# Patient Record
Sex: FEMALE | Race: WHITE | NOT HISPANIC OR LATINO | Employment: OTHER | URBAN - METROPOLITAN AREA
[De-identification: names, ages, dates, MRNs, and addresses within clinical notes are randomized per-mention and may not be internally consistent; named-entity substitution may affect disease eponyms.]

---

## 2017-05-03 ENCOUNTER — OFFICE VISIT - HEALTHEAST (OUTPATIENT)
Dept: INTERNAL MEDICINE | Facility: CLINIC | Age: 62
End: 2017-05-03

## 2017-05-03 DIAGNOSIS — Z78.0 MENOPAUSE: ICD-10-CM

## 2017-05-03 DIAGNOSIS — E78.2 MIXED HYPERLIPIDEMIA: ICD-10-CM

## 2017-05-03 DIAGNOSIS — I10 ESSENTIAL HYPERTENSION WITH GOAL BLOOD PRESSURE LESS THAN 140/90: ICD-10-CM

## 2017-05-03 DIAGNOSIS — Z00.00 HEALTH CARE MAINTENANCE: ICD-10-CM

## 2017-05-03 LAB
CHOLEST SERPL-MCNC: 263 MG/DL
FASTING STATUS PATIENT QL REPORTED: YES
HDLC SERPL-MCNC: 61 MG/DL
LDLC SERPL CALC-MCNC: 180 MG/DL
TRIGL SERPL-MCNC: 109 MG/DL

## 2017-05-03 ASSESSMENT — MIFFLIN-ST. JEOR: SCORE: 1136.99

## 2017-09-10 ENCOUNTER — HEALTH MAINTENANCE LETTER (OUTPATIENT)
Age: 62
End: 2017-09-10

## 2017-10-25 ENCOUNTER — COMMUNICATION - HEALTHEAST (OUTPATIENT)
Dept: INTERNAL MEDICINE | Facility: CLINIC | Age: 62
End: 2017-10-25

## 2018-02-02 ENCOUNTER — COMMUNICATION - HEALTHEAST (OUTPATIENT)
Dept: INTERNAL MEDICINE | Facility: CLINIC | Age: 63
End: 2018-02-02

## 2018-04-04 ENCOUNTER — COMMUNICATION - HEALTHEAST (OUTPATIENT)
Dept: INTERNAL MEDICINE | Facility: CLINIC | Age: 63
End: 2018-04-04

## 2018-04-04 DIAGNOSIS — Z78.0 MENOPAUSE: ICD-10-CM

## 2018-06-06 ENCOUNTER — HOSPITAL ENCOUNTER (OUTPATIENT)
Dept: MAMMOGRAPHY | Facility: CLINIC | Age: 63
Discharge: HOME | End: 2018-06-06
Attending: INTERNAL MEDICINE

## 2018-06-06 DIAGNOSIS — Z12.31 VISIT FOR SCREENING MAMMOGRAM: ICD-10-CM

## 2018-07-11 ENCOUNTER — OFFICE VISIT - HEALTHEAST (OUTPATIENT)
Dept: INTERNAL MEDICINE | Facility: CLINIC | Age: 63
End: 2018-07-11

## 2018-07-11 DIAGNOSIS — E55.9 VITAMIN D DEFICIENCY: ICD-10-CM

## 2018-07-11 DIAGNOSIS — Z00.00 WELCOME TO MEDICARE PREVENTIVE VISIT: ICD-10-CM

## 2018-07-11 DIAGNOSIS — Z00.01 ENCOUNTER FOR GENERAL ADULT MEDICAL EXAMINATION WITH ABNORMAL FINDINGS: ICD-10-CM

## 2018-07-11 DIAGNOSIS — Z78.0 MENOPAUSE: ICD-10-CM

## 2018-07-11 DIAGNOSIS — I10 ESSENTIAL HYPERTENSION: ICD-10-CM

## 2018-07-11 DIAGNOSIS — E78.5 HYPERLIPIDEMIA, UNSPECIFIED HYPERLIPIDEMIA TYPE: ICD-10-CM

## 2018-07-11 LAB
ALBUMIN SERPL-MCNC: 4.3 G/DL (ref 3.5–5)
ALBUMIN UR-MCNC: NEGATIVE MG/DL
ALP SERPL-CCNC: 55 U/L (ref 45–120)
ALT SERPL W P-5'-P-CCNC: 16 U/L (ref 0–45)
ANION GAP SERPL CALCULATED.3IONS-SCNC: 12 MMOL/L (ref 5–18)
APPEARANCE UR: CLEAR
AST SERPL W P-5'-P-CCNC: 22 U/L (ref 0–40)
ATRIAL RATE - MUSE: 67 BPM
BILIRUB DIRECT SERPL-MCNC: 0.2 MG/DL
BILIRUB SERPL-MCNC: 0.8 MG/DL (ref 0–1)
BILIRUB UR QL STRIP: NEGATIVE
BUN SERPL-MCNC: 16 MG/DL (ref 8–22)
CALCIUM SERPL-MCNC: 9.7 MG/DL (ref 8.5–10.5)
CHLORIDE BLD-SCNC: 104 MMOL/L (ref 98–107)
CHOLEST SERPL-MCNC: 270 MG/DL
CO2 SERPL-SCNC: 25 MMOL/L (ref 22–31)
COLOR UR AUTO: YELLOW
CREAT SERPL-MCNC: 0.85 MG/DL (ref 0.6–1.1)
DIASTOLIC BLOOD PRESSURE - MUSE: NORMAL MMHG
ERYTHROCYTE [DISTWIDTH] IN BLOOD BY AUTOMATED COUNT: 11.6 % (ref 11–14.5)
FASTING STATUS PATIENT QL REPORTED: YES
GFR SERPL CREATININE-BSD FRML MDRD: >60 ML/MIN/1.73M2
GLUCOSE BLD-MCNC: 80 MG/DL (ref 70–125)
GLUCOSE UR STRIP-MCNC: NEGATIVE MG/DL
HCT VFR BLD AUTO: 41.9 % (ref 35–47)
HDLC SERPL-MCNC: 79 MG/DL
HGB BLD-MCNC: 14.1 G/DL (ref 12–16)
HGB UR QL STRIP: NEGATIVE
INTERPRETATION ECG - MUSE: NORMAL
KETONES UR STRIP-MCNC: NEGATIVE MG/DL
LDLC SERPL CALC-MCNC: 175 MG/DL
LEUKOCYTE ESTERASE UR QL STRIP: NEGATIVE
MCH RBC QN AUTO: 31.2 PG (ref 27–34)
MCHC RBC AUTO-ENTMCNC: 33.5 G/DL (ref 32–36)
MCV RBC AUTO: 93 FL (ref 80–100)
NITRATE UR QL: NEGATIVE
P AXIS - MUSE: 74 DEGREES
PH UR STRIP: 6 [PH] (ref 5–8)
PLATELET # BLD AUTO: 284 THOU/UL (ref 140–440)
PMV BLD AUTO: 8.1 FL (ref 7–10)
POTASSIUM BLD-SCNC: 4 MMOL/L (ref 3.5–5)
PR INTERVAL - MUSE: 206 MS
PROT SERPL-MCNC: 6.9 G/DL (ref 6–8)
QRS DURATION - MUSE: 88 MS
QT - MUSE: 426 MS
QTC - MUSE: 450 MS
R AXIS - MUSE: 11 DEGREES
RBC # BLD AUTO: 4.5 MILL/UL (ref 3.8–5.4)
SODIUM SERPL-SCNC: 141 MMOL/L (ref 136–145)
SP GR UR STRIP: <=1.005 (ref 1–1.03)
SYSTOLIC BLOOD PRESSURE - MUSE: NORMAL MMHG
T AXIS - MUSE: 34 DEGREES
TRIGL SERPL-MCNC: 81 MG/DL
TSH SERPL DL<=0.005 MIU/L-ACNC: 1.36 UIU/ML (ref 0.3–5)
UROBILINOGEN UR STRIP-ACNC: NORMAL
VENTRICULAR RATE- MUSE: 67 BPM
WBC: 5.2 THOU/UL (ref 4–11)

## 2018-07-11 ASSESSMENT — MIFFLIN-ST. JEOR: SCORE: 1150.32

## 2018-07-12 LAB — 25(OH)D3 SERPL-MCNC: 51.6 NG/ML (ref 30–80)

## 2018-07-18 ENCOUNTER — RECORDS - HEALTHEAST (OUTPATIENT)
Dept: ADMINISTRATIVE | Facility: OTHER | Age: 63
End: 2018-07-18

## 2018-07-25 ENCOUNTER — RECORDS - HEALTHEAST (OUTPATIENT)
Dept: BONE DENSITY | Facility: CLINIC | Age: 63
End: 2018-07-25

## 2018-07-25 ENCOUNTER — RECORDS - HEALTHEAST (OUTPATIENT)
Dept: ADMINISTRATIVE | Facility: OTHER | Age: 63
End: 2018-07-25

## 2018-07-25 DIAGNOSIS — Z78.0 ASYMPTOMATIC MENOPAUSAL STATE: ICD-10-CM

## 2018-07-29 ENCOUNTER — COMMUNICATION - HEALTHEAST (OUTPATIENT)
Dept: INTERNAL MEDICINE | Facility: CLINIC | Age: 63
End: 2018-07-29

## 2018-08-23 ENCOUNTER — COMMUNICATION - HEALTHEAST (OUTPATIENT)
Dept: INTERNAL MEDICINE | Facility: CLINIC | Age: 63
End: 2018-08-23

## 2018-10-03 ENCOUNTER — COMMUNICATION - HEALTHEAST (OUTPATIENT)
Dept: INTERNAL MEDICINE | Facility: CLINIC | Age: 63
End: 2018-10-03

## 2018-10-03 DIAGNOSIS — M89.9 DISORDER OF BONE AND CARTILAGE: ICD-10-CM

## 2018-10-03 DIAGNOSIS — M94.9 DISORDER OF BONE AND CARTILAGE: ICD-10-CM

## 2018-10-10 ENCOUNTER — COMMUNICATION - HEALTHEAST (OUTPATIENT)
Dept: INTERNAL MEDICINE | Facility: CLINIC | Age: 63
End: 2018-10-10

## 2018-10-11 ENCOUNTER — COMMUNICATION - HEALTHEAST (OUTPATIENT)
Dept: INTERNAL MEDICINE | Facility: CLINIC | Age: 63
End: 2018-10-11

## 2018-10-11 DIAGNOSIS — M94.9 DISORDER OF BONE AND CARTILAGE: ICD-10-CM

## 2018-10-11 DIAGNOSIS — M89.9 DISORDER OF BONE AND CARTILAGE: ICD-10-CM

## 2019-04-24 ENCOUNTER — COMMUNICATION - HEALTHEAST (OUTPATIENT)
Dept: INTERNAL MEDICINE | Facility: CLINIC | Age: 64
End: 2019-04-24

## 2019-06-29 ENCOUNTER — RECORDS - HEALTHEAST (OUTPATIENT)
Dept: ADMINISTRATIVE | Facility: OTHER | Age: 64
End: 2019-06-29

## 2019-07-12 ENCOUNTER — OFFICE VISIT - HEALTHEAST (OUTPATIENT)
Dept: INTERNAL MEDICINE | Facility: CLINIC | Age: 64
End: 2019-07-12

## 2019-07-12 DIAGNOSIS — Z78.0 MENOPAUSE: ICD-10-CM

## 2019-07-12 DIAGNOSIS — M94.9 DISORDER OF BONE AND CARTILAGE: ICD-10-CM

## 2019-07-12 DIAGNOSIS — I25.10 CORONARY ARTERY CALCIFICATION: ICD-10-CM

## 2019-07-12 DIAGNOSIS — M89.9 DISORDER OF BONE AND CARTILAGE: ICD-10-CM

## 2019-07-12 DIAGNOSIS — E78.2 MIXED HYPERLIPIDEMIA: ICD-10-CM

## 2019-07-12 DIAGNOSIS — Z00.00 WELLNESS EXAMINATION: ICD-10-CM

## 2019-07-12 DIAGNOSIS — Z12.31 VISIT FOR SCREENING MAMMOGRAM: ICD-10-CM

## 2019-07-12 ASSESSMENT — MIFFLIN-ST. JEOR: SCORE: 1133.02

## 2019-07-15 ENCOUNTER — COMMUNICATION - HEALTHEAST (OUTPATIENT)
Dept: INTERNAL MEDICINE | Facility: CLINIC | Age: 64
End: 2019-07-15

## 2019-07-17 ENCOUNTER — AMBULATORY - HEALTHEAST (OUTPATIENT)
Dept: INTERNAL MEDICINE | Facility: CLINIC | Age: 64
End: 2019-07-17

## 2019-07-17 DIAGNOSIS — Z12.31 VISIT FOR SCREENING MAMMOGRAM: ICD-10-CM

## 2019-09-12 ENCOUNTER — HOSPITAL ENCOUNTER (OUTPATIENT)
Dept: MAMMOGRAPHY | Facility: CLINIC | Age: 64
Discharge: HOME | End: 2019-09-12

## 2019-09-12 DIAGNOSIS — Z12.31 VISIT FOR SCREENING MAMMOGRAM: ICD-10-CM

## 2019-10-14 ENCOUNTER — AMBULATORY - HEALTHEAST (OUTPATIENT)
Dept: LAB | Facility: CLINIC | Age: 64
End: 2019-10-14

## 2019-10-14 DIAGNOSIS — E78.2 MIXED HYPERLIPIDEMIA: ICD-10-CM

## 2019-10-14 LAB
ANION GAP SERPL CALCULATED.3IONS-SCNC: 9 MMOL/L (ref 5–18)
BUN SERPL-MCNC: 14 MG/DL (ref 8–22)
CALCIUM SERPL-MCNC: 9.5 MG/DL (ref 8.5–10.5)
CHLORIDE BLD-SCNC: 101 MMOL/L (ref 98–107)
CHOLEST SERPL-MCNC: 229 MG/DL
CO2 SERPL-SCNC: 29 MMOL/L (ref 22–31)
CREAT SERPL-MCNC: 0.97 MG/DL
ERYTHROCYTE [DISTWIDTH] IN BLOOD BY AUTOMATED COUNT: 10.7 % (ref 11–14.5)
FASTING STATUS PATIENT QL REPORTED: YES
GFR SERPL CREATININE-BSD FRML MDRD: NORMAL ML/MIN/{1.73_M2}
GLUCOSE BLD-MCNC: 78 MG/DL (ref 70–125)
HCT VFR BLD AUTO: 42.4 %
HDLC SERPL-MCNC: 68 MG/DL
HGB BLD-MCNC: 14.6 G/DL
LDLC SERPL CALC-MCNC: 143 MG/DL
MCH RBC QN AUTO: 33.2 PG (ref 27–34)
MCHC RBC AUTO-ENTMCNC: 34.4 G/DL (ref 32–36)
MCV RBC AUTO: 97 FL
PLATELET # BLD AUTO: 245 THOU/UL (ref 140–440)
PMV BLD AUTO: 8.5 FL (ref 7–10)
POTASSIUM BLD-SCNC: 4.7 MMOL/L (ref 3.5–5)
RBC # BLD AUTO: 4.39 MILL/UL
SODIUM SERPL-SCNC: 139 MMOL/L (ref 136–145)
TRIGL SERPL-MCNC: 89 MG/DL
WBC: 4.7 THOU/UL (ref 4–11)

## 2019-10-16 ENCOUNTER — OFFICE VISIT - HEALTHEAST (OUTPATIENT)
Dept: INTERNAL MEDICINE | Facility: CLINIC | Age: 64
End: 2019-10-16

## 2019-10-16 DIAGNOSIS — E78.2 MIXED HYPERLIPIDEMIA: ICD-10-CM

## 2019-10-16 DIAGNOSIS — I25.10 CORONARY ARTERY CALCIFICATION: ICD-10-CM

## 2019-10-16 ASSESSMENT — MIFFLIN-ST. JEOR: SCORE: 1137.56

## 2019-11-01 ENCOUNTER — OFFICE VISIT - HEALTHEAST (OUTPATIENT)
Dept: CARDIOLOGY | Facility: CLINIC | Age: 64
End: 2019-11-01

## 2019-11-01 DIAGNOSIS — I25.10 CORONARY ARTERY CALCIFICATION: ICD-10-CM

## 2019-11-01 DIAGNOSIS — E78.2 MIXED HYPERLIPIDEMIA: ICD-10-CM

## 2019-11-01 ASSESSMENT — MIFFLIN-ST. JEOR: SCORE: 1128.49

## 2019-11-08 ENCOUNTER — HEALTH MAINTENANCE LETTER (OUTPATIENT)
Age: 64
End: 2019-11-08

## 2020-02-23 ENCOUNTER — HEALTH MAINTENANCE LETTER (OUTPATIENT)
Age: 65
End: 2020-02-23

## 2020-07-13 ENCOUNTER — COMMUNICATION - HEALTHEAST (OUTPATIENT)
Dept: INTERNAL MEDICINE | Facility: CLINIC | Age: 65
End: 2020-07-13

## 2020-07-16 ENCOUNTER — OFFICE VISIT - HEALTHEAST (OUTPATIENT)
Dept: INTERNAL MEDICINE | Facility: CLINIC | Age: 65
End: 2020-07-16

## 2020-07-16 DIAGNOSIS — Z00.00 WELLNESS EXAMINATION: ICD-10-CM

## 2020-07-16 DIAGNOSIS — Z11.59 NEED FOR HEPATITIS C SCREENING TEST: ICD-10-CM

## 2020-07-16 DIAGNOSIS — Z78.0 MENOPAUSE: ICD-10-CM

## 2020-07-16 DIAGNOSIS — E78.2 MIXED HYPERLIPIDEMIA: ICD-10-CM

## 2020-07-16 LAB
ALBUMIN SERPL-MCNC: 4.3 G/DL (ref 3.5–5)
ALP SERPL-CCNC: 68 U/L (ref 45–120)
ALT SERPL W P-5'-P-CCNC: 17 U/L (ref 0–45)
ANION GAP SERPL CALCULATED.3IONS-SCNC: 10 MMOL/L (ref 5–18)
AST SERPL W P-5'-P-CCNC: 25 U/L (ref 0–40)
BILIRUB SERPL-MCNC: 0.5 MG/DL (ref 0–1)
BUN SERPL-MCNC: 15 MG/DL (ref 8–22)
CALCIUM SERPL-MCNC: 10 MG/DL (ref 8.5–10.5)
CHLORIDE BLD-SCNC: 103 MMOL/L (ref 98–107)
CHOLEST SERPL-MCNC: 245 MG/DL
CO2 SERPL-SCNC: 29 MMOL/L (ref 22–31)
CREAT SERPL-MCNC: 0.89 MG/DL
ERYTHROCYTE [DISTWIDTH] IN BLOOD BY AUTOMATED COUNT: 11.2 % (ref 11–14.5)
FASTING STATUS PATIENT QL REPORTED: YES
GFR SERPL CREATININE-BSD FRML MDRD: NORMAL ML/MIN/{1.73_M2}
GLUCOSE BLD-MCNC: 79 MG/DL (ref 70–125)
HCT VFR BLD AUTO: 44.3 %
HCV AB SERPL QL IA: NEGATIVE
HDLC SERPL-MCNC: 80 MG/DL
HGB BLD-MCNC: 14.9 G/DL
LDLC SERPL CALC-MCNC: 149 MG/DL
MCH RBC QN AUTO: 32.8 PG (ref 27–34)
MCHC RBC AUTO-ENTMCNC: 33.7 G/DL (ref 32–36)
MCV RBC AUTO: 97 FL
PLATELET # BLD AUTO: 266 THOU/UL (ref 140–440)
PMV BLD AUTO: 9.2 FL (ref 7–10)
POTASSIUM BLD-SCNC: 4.2 MMOL/L (ref 3.5–5)
PROT SERPL-MCNC: 7.2 G/DL (ref 6–8)
RBC # BLD AUTO: 4.55 MILL/UL
SODIUM SERPL-SCNC: 142 MMOL/L (ref 136–145)
TRIGL SERPL-MCNC: 78 MG/DL
TSH SERPL DL<=0.005 MIU/L-ACNC: 0.94 UIU/ML (ref 0.3–5)
WBC: 5.3 THOU/UL (ref 4–11)

## 2020-07-16 ASSESSMENT — MIFFLIN-ST. JEOR: SCORE: 1133.02

## 2020-07-16 ASSESSMENT — ANXIETY QUESTIONNAIRES
1. FEELING NERVOUS, ANXIOUS, OR ON EDGE: NOT AT ALL
2. NOT BEING ABLE TO STOP OR CONTROL WORRYING: NOT AT ALL

## 2020-09-15 ENCOUNTER — HOSPITAL ENCOUNTER (OUTPATIENT)
Dept: MAMMOGRAPHY | Facility: CLINIC | Age: 65
Discharge: HOME | End: 2020-09-15

## 2020-09-15 DIAGNOSIS — Z12.31 VISIT FOR SCREENING MAMMOGRAM: ICD-10-CM

## 2020-09-22 ENCOUNTER — OFFICE VISIT - HEALTHEAST (OUTPATIENT)
Dept: INTERNAL MEDICINE | Facility: CLINIC | Age: 65
End: 2020-09-22

## 2020-09-23 ENCOUNTER — COMMUNICATION - HEALTHEAST (OUTPATIENT)
Dept: SCHEDULING | Facility: CLINIC | Age: 65
End: 2020-09-23

## 2020-09-24 ENCOUNTER — OFFICE VISIT - HEALTHEAST (OUTPATIENT)
Dept: INTERNAL MEDICINE | Facility: CLINIC | Age: 65
End: 2020-09-24

## 2020-09-24 DIAGNOSIS — K86.89 PANCREATIC MASS: ICD-10-CM

## 2020-09-24 DIAGNOSIS — R10.13 ABDOMINAL PAIN, EPIGASTRIC: ICD-10-CM

## 2020-09-24 DIAGNOSIS — R11.0 NAUSEA: ICD-10-CM

## 2020-09-24 LAB
ALBUMIN SERPL-MCNC: 4 G/DL (ref 3.5–5)
ALP SERPL-CCNC: 71 U/L (ref 45–120)
ALT SERPL W P-5'-P-CCNC: 15 U/L (ref 0–45)
ANION GAP SERPL CALCULATED.3IONS-SCNC: 7 MMOL/L (ref 5–18)
AST SERPL W P-5'-P-CCNC: 25 U/L (ref 0–40)
BILIRUB SERPL-MCNC: 0.2 MG/DL (ref 0–1)
BUN SERPL-MCNC: 15 MG/DL (ref 8–22)
CALCIUM SERPL-MCNC: 9.1 MG/DL (ref 8.5–10.5)
CHLORIDE BLD-SCNC: 107 MMOL/L (ref 98–107)
CO2 SERPL-SCNC: 29 MMOL/L (ref 22–31)
CREAT SERPL-MCNC: 0.84 MG/DL
ERYTHROCYTE [DISTWIDTH] IN BLOOD BY AUTOMATED COUNT: 10.8 % (ref 11–14.5)
GFR SERPL CREATININE-BSD FRML MDRD: NORMAL ML/MIN/{1.73_M2}
GLUCOSE BLD-MCNC: 81 MG/DL (ref 70–125)
HCT VFR BLD AUTO: 40.3 %
HGB BLD-MCNC: 13.7 G/DL
MCH RBC QN AUTO: 32.6 PG (ref 27–34)
MCHC RBC AUTO-ENTMCNC: 34 G/DL (ref 32–36)
MCV RBC AUTO: 96 FL
PLATELET # BLD AUTO: 244 THOU/UL (ref 140–440)
PMV BLD AUTO: 9.3 FL (ref 7–10)
POTASSIUM BLD-SCNC: 4.4 MMOL/L (ref 3.5–5)
PROT SERPL-MCNC: 6.6 G/DL (ref 6–8)
RBC # BLD AUTO: 4.2 MILL/UL
SODIUM SERPL-SCNC: 143 MMOL/L (ref 136–145)
WBC: 4.8 THOU/UL (ref 4–11)

## 2020-09-24 ASSESSMENT — MIFFLIN-ST. JEOR: SCORE: 1137.56

## 2020-09-25 ENCOUNTER — HOSPITAL ENCOUNTER (OUTPATIENT)
Dept: ULTRASOUND IMAGING | Facility: CLINIC | Age: 65
Discharge: HOME | End: 2020-09-25

## 2020-09-25 DIAGNOSIS — R11.0 NAUSEA: ICD-10-CM

## 2020-09-25 DIAGNOSIS — R10.13 ABDOMINAL PAIN, EPIGASTRIC: ICD-10-CM

## 2020-09-27 ENCOUNTER — COMMUNICATION - HEALTHEAST (OUTPATIENT)
Dept: SCHEDULING | Facility: CLINIC | Age: 65
End: 2020-09-27

## 2020-10-02 ENCOUNTER — HOSPITAL ENCOUNTER (OUTPATIENT)
Dept: MRI IMAGING | Facility: CLINIC | Age: 65
Discharge: HOME | End: 2020-10-02

## 2020-10-02 ENCOUNTER — COMMUNICATION - HEALTHEAST (OUTPATIENT)
Dept: INTERNAL MEDICINE | Facility: CLINIC | Age: 65
End: 2020-10-02

## 2020-10-02 DIAGNOSIS — R10.13 ABDOMINAL PAIN, EPIGASTRIC: ICD-10-CM

## 2020-10-02 DIAGNOSIS — K86.89 PANCREATIC MASS: ICD-10-CM

## 2020-10-13 ENCOUNTER — OFFICE VISIT - HEALTHEAST (OUTPATIENT)
Dept: INTERNAL MEDICINE | Facility: CLINIC | Age: 65
End: 2020-10-13

## 2020-10-13 DIAGNOSIS — Z23 FLU VACCINE NEED: ICD-10-CM

## 2020-10-13 DIAGNOSIS — K21.9 GASTROESOPHAGEAL REFLUX DISEASE WITHOUT ESOPHAGITIS: ICD-10-CM

## 2020-10-13 DIAGNOSIS — R19.00 PELVIC FULLNESS: ICD-10-CM

## 2020-10-13 ASSESSMENT — MIFFLIN-ST. JEOR: SCORE: 1133.02

## 2020-10-15 ENCOUNTER — RECORDS - HEALTHEAST (OUTPATIENT)
Dept: ADMINISTRATIVE | Facility: OTHER | Age: 65
End: 2020-10-15

## 2020-11-16 ENCOUNTER — COMMUNICATION - HEALTHEAST (OUTPATIENT)
Dept: INTERNAL MEDICINE | Facility: CLINIC | Age: 65
End: 2020-11-16

## 2020-12-06 ENCOUNTER — HEALTH MAINTENANCE LETTER (OUTPATIENT)
Age: 65
End: 2020-12-06

## 2020-12-12 ENCOUNTER — COMMUNICATION - HEALTHEAST (OUTPATIENT)
Dept: INTERNAL MEDICINE | Facility: CLINIC | Age: 65
End: 2020-12-12

## 2020-12-12 DIAGNOSIS — K21.9 GASTROESOPHAGEAL REFLUX DISEASE WITHOUT ESOPHAGITIS: ICD-10-CM

## 2021-03-07 ENCOUNTER — COMMUNICATION - HEALTHEAST (OUTPATIENT)
Dept: INTERNAL MEDICINE | Facility: CLINIC | Age: 66
End: 2021-03-07

## 2021-04-11 ENCOUNTER — HEALTH MAINTENANCE LETTER (OUTPATIENT)
Age: 66
End: 2021-04-11

## 2021-04-29 ENCOUNTER — COMMUNICATION - HEALTHEAST (OUTPATIENT)
Dept: CARDIOLOGY | Facility: CLINIC | Age: 66
End: 2021-04-29

## 2021-05-06 ENCOUNTER — RECORDS - HEALTHEAST (OUTPATIENT)
Dept: TELEHEALTH | Facility: CLINIC | Age: 66
End: 2021-05-06

## 2021-05-06 ENCOUNTER — COMMUNICATION - HEALTHEAST (OUTPATIENT)
Dept: FAMILY MEDICINE | Facility: CLINIC | Age: 66
End: 2021-05-06

## 2021-05-30 VITALS — WEIGHT: 131 LBS | HEIGHT: 66 IN | BODY MASS INDEX: 21.05 KG/M2

## 2021-05-30 NOTE — PROGRESS NOTES
Assessment and Plan:     1. Wellness examination  She is here for a wellness exam and to discuss medical concerns.  I did review all the information provided for the wellness exam and there are no concerns.  She lives independently and has no risk for falls or signs of dementia.  She is due for a screening mammogram, and bone density screening.  We will provide the pneumonia vaccine today.  She is otherwise up-to-date on age-appropriate health maintenance.    2. Mixed hyperlipidemia  She has a history of hyperlipidemia.  A year ago her LDL cholesterol was in the 180 range.  She has made some dietary changes and it is now at 145.  This is based on labs done outside the clinic.  We discussed this in detail today as she exercises frequently and leads a very healthy lifestyle.  She has no history of hypertension, is a non-smoker, and is not diabetic.  She did have a coronary calcium score done about a year ago and it was in the 50-75th percentile range.  She also had slightly elevated ankle brachial index measurements recently.  I did discuss the potential benefits of statin therapy.  She is reluctant to start a medication.  I am therefore going to have her recheck her labs in 3 months and we will see her back after that to determine next steps.  She is completely asymptomatic.  - HM2(CBC w/o Differential); Future  - Lipid Cascade; Future  - Basic Metabolic Panel; Future    3. Osteopenia  She is not due for repeat bone density.    4. Visit for screening mammogram  - Mammo Screening Bilateral; Future    5. Menopause  We did discuss decreasing her dose of estradiol.  She is in agreement to doing that.  - estradiol (CLIMARA) 0.0375 mg/24 hr; Place 1 patch on the skin once a week.  Dispense: 12 patch; Refill: 3    6. Coronary artery calcification  As above we will recheck her lipids in 3 months.  She is asymptomatic.  She is reluctant to start statin therapy.    The patient's current medical problems were reviewed.    I  have had an Advance Directives discussion with the patient.  The following health maintenance schedule was reviewed with the patient and provided in printed form in the after visit summary:   Health Maintenance   Topic Date Due     ZOSTER VACCINES (2 of 3) 10/25/2013     PNEUMOCOCCAL POLYSACCHARIDE VACCINE AGE 65 AND OVER  05/04/2018     FALL RISK ASSESSMENT  07/11/2019     TD 18+ HE  12/22/2019     INFLUENZA VACCINE RULE BASED (1) 08/01/2019     MAMMOGRAM  06/06/2020     DXA SCAN  07/25/2020     ADVANCE DIRECTIVES DISCUSSED WITH PATIENT  07/11/2023     COLONOSCOPY  12/08/2026     PNEUMOCOCCAL CONJUGATE VACCINE FOR ADULTS (PCV13 OR PREVNAR)  Completed        Subjective:   Chief Complaint: Suzanne Malik is an 64 y.o. adult here for an Annual Wellness visit.   HPI: This is a 64-year-old female who comes in for a wellness visit, to establish care, and to discuss medical problems.  She is due for mammogram and pneumonia vaccine.  She is otherwise up-to-date on age-appropriate health maintenance.  She leads a very healthy lifestyle and is a non-smoker.  She exercises frequently and is normal weight.  She has had bone density in the past and has osteopenia but not osteoporosis.    Her biggest concern is her hyperlipidemia.  She has had elevated lipids for a long time.  A year ago her LDL was in the 180 range.  She saw a nutritionist and has done some changes in her diet.  She had labs done recently at [facility] and had an LDL at 146.  She also brings in some outside records for a coronary calcium score that was done a year ago.  It was at 19 which was considered in the 50-75th percentile.  She also had ankle-brachial indices done recently that were slightly elevated.  She has not had any problems with chest pain, palpitations, dyspnea on exertion or other worrisome cardiac symptoms.  We did discuss the potential benefits of statin medication but she is reluctant to start statins as both of her parents had significant  myalgias with that.  She would like to recheck these labs in a few months and have a follow-up visit at that time to determine next steps.  I think that that could be beneficial.  She has no other acute concerns today and is otherwise quite healthy.    Review of Systems: Comprehensive review of systems was obtained today and is detailed above.  The rest of the review of systems are negative for all systems.    Patient Care Team:  Delmy Ramirez MD as PCP - General (Internal Medicine)     Patient Active Problem List   Diagnosis     Mixed hyperlipidemia     Osteoarthritis     Osteopenia     Coronary artery calcification     Past Medical History:   Diagnosis Date     Basal cell cancer 2014     Breast cyst 2008    Left breast     Breast cyst 2011    Right breast      Past Surgical History:   Procedure Laterality Date     BASAL CELL CARCINOMA EXCISION  2014    left shoulder     BREAST CYST ASPIRATION Left 2008     BREAST CYST ASPIRATION Right 2011     NC APPENDECTOMY      Description: Appendectomy;  Proc Date: 01/01/1963;     NC REMOVAL OF TONSILS,<11 Y/O      Description: Tonsillectomy;  Proc Date: 01/01/1957;     NC TOTAL ABDOM HYSTERECTOMY  1998    for adenomyosis      Family History   Problem Relation Age of Onset     Hypertension Mother         alive in her 80s     Hyperlipidemia Mother      Heart disease Father         alive in his 80s     Deep vein thrombosis Father      Pulmonary embolism Father      No Medical Problems Brother       Social History     Socioeconomic History     Marital status:      Spouse name: Not on file     Number of children: 0     Years of education: Not on file     Highest education level: Not on file   Occupational History     Occupation: retired   Social Needs     Financial resource strain: Not on file     Food insecurity:     Worry: Not on file     Inability: Not on file     Transportation needs:     Medical: Not on file     Non-medical: Not on file   Tobacco Use     Smoking  "status: Never Smoker     Smokeless tobacco: Never Used   Substance and Sexual Activity     Alcohol use: Not on file     Drug use: No     Sexual activity: Never   Lifestyle     Physical activity:     Days per week: Not on file     Minutes per session: Not on file     Stress: Not on file   Relationships     Social connections:     Talks on phone: Not on file     Gets together: Not on file     Attends [employer] service: Not on file     Active member of club or organization: Not on file     Attends meetings of clubs or organizations: Not on file     Relationship status: Not on file     Intimate partner violence:     Fear of current or ex partner: Not on file     Emotionally abused: Not on file     Physically abused: Not on file     Forced sexual activity: Not on file   Other Topics Concern     Not on file   Social History Narrative    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  She lives alone and does not have children and enjoys outdoor activities.      Current Outpatient Medications   Medication Sig Dispense Refill     ascorbic acid (ASCORBIC ACID WITH ELI HIPS) 500 MG tablet Take 500 mg by mouth daily.       b complex vitamins capsule Take 1 capsule by mouth daily.       CALCIUM CITRATE ORAL Take 400 mg by mouth.       cetirizine (ZYRTEC) 5 MG tablet Use 5 mg As Directed daily.       DOCOSAHEXANOIC ACID/EPA (FISH OIL ORAL)        estradiol (CLIMARA) 0.0375 mg/24 hr Place 1 patch on the skin once a week. 12 patch 3     No current facility-administered medications for this visit.       Objective:   Vital Signs:   Visit Vitals  /84   Pulse 78   Ht 5' 5.5\" (1.664 m)   Wt 131 lb (59.4 kg)   SpO2 100%   BMI 21.47 kg/m         VisionScreening:  No exam data present     PHYSICAL EXAM  General:  Patient is alert and in no apparent distress.  Neck:  Supple with no adenopathy or thyroid abnormality noted.  Breast:  Normal breast tissues with no masses or adenopathy " noted.  Cardiovascular:  Regular rate and rhythm, normal S1/S2, no murmurs, rubs, or gallop.  Pulmonary:  Lungs are clear to auscultation bilaterally with normal respiratory effort.  Gastrointestinal:  Abdomen is soft, non-tender, non-distended, with no organomegaly, rebound or guarding.  Extremities:  No joint abnormalities with no LE edema.  Dorsalis pedis pulses are decreased bilaterally.  Neurologic Cranial nerves are intact.  No focal deficits.  Psychiatric:  Pleasant, no confusion or agitation   Skin:  Warm and well perfused with no rashes or abnormalities.        Assessment Results 7/12/2019   Activities of Daily Living No help needed   Instrumental Activities of Daily Living No help needed   Get Up and Go Score Less than 12 seconds   Mini Cog Total Score 5   Some recent data might be hidden     A Mini-Cog score of 0-2 suggests the possibility of dementia, score of 3-5 suggests no dementia    Identified Health Risks:     Patient's advanced directive was discussed and I am comfortable with the patient's wishes.

## 2021-05-30 NOTE — TELEPHONE ENCOUNTER
I called Maggy and she will call and schedule her appt.  I don't think that you need to sign it.  I see that the order is in her chart already under the order review tab.

## 2021-05-30 NOTE — TELEPHONE ENCOUNTER
Thank you.  Do I need to sign it?  Also, there is another message from her about this so please contact her to make sure she has everything she needs.  ALFONSO

## 2021-05-30 NOTE — PATIENT INSTRUCTIONS - HE
Advance Directive  Patient s advance directive was discussed and I am comfortable with the patient s wishes.  Patient Education   Personalized Prevention Plan  You are due for the preventive services outlined below.  Your care team is available to assist you in scheduling these services.  If you have already completed any of these items, please share that information with your care team to update in your medical record.  Health Maintenance   Topic Date Due     ZOSTER VACCINES (2 of 3) 10/25/2013     PNEUMOCOCCAL POLYSACCHARIDE VACCINE AGE 65 AND OVER  05/04/2018     FALL RISK ASSESSMENT  07/11/2019     TD 18+ HE  12/22/2019     INFLUENZA VACCINE RULE BASED (1) 08/01/2019     MAMMOGRAM  06/06/2020     DXA SCAN  07/25/2020     ADVANCE DIRECTIVES DISCUSSED WITH PATIENT  07/11/2023     COLONOSCOPY  12/08/2026     PNEUMOCOCCAL CONJUGATE VACCINE FOR ADULTS (PCV13 OR PREVNAR)  Completed

## 2021-05-31 ENCOUNTER — RECORDS - HEALTHEAST (OUTPATIENT)
Dept: ADMINISTRATIVE | Facility: CLINIC | Age: 66
End: 2021-05-31

## 2021-06-01 VITALS — BODY MASS INDEX: 21.38 KG/M2 | HEIGHT: 66 IN | WEIGHT: 133.06 LBS

## 2021-06-02 NOTE — PROGRESS NOTES
Office Visit   Suzanne Malik   64 y.o. adult    Date of Visit: 10/16/2019    Chief Complaint   Patient presents with     Follow-up     3 month follow up        Assessment and Plan   1. Mixed hyperlipidemia  We did review her lipids today.  She has made significant lifestyle changes and her LDL has gone from 180-year ago to 143.  This is fantastic.  However she does have a history of a slightly elevated calcium score.  We calculated her cardiac risk over the next 10 years and its at 6.3%.  We did a long discussion about this today and have decided to have her visit with a cardiologist to help determine next steps.  She is very reluctant to take a statin medication due to a family history of significant side effects.    2. Coronary artery calcification  - Ambulatory referral to Cardiology         No follow-ups on file.     History of Present Illness   This 64 y.o. old female comes in to follow-up.  She has hyperlipidemia.  She has been working on significant lifestyle changes and has brought her LDL from 180-143.  She has no other risk factors for heart disease.  Her risk for cardiac event in the next 10 years is at 6.3%.  She is a non-smoker.  She has a family history of hyperlipidemia and states that both of her parents had significant side effects from statin medications.  She is therefore reluctant to start a medication.  Ultimately we have decided to have her see cardiology to determine appropriate next steps.    Review of Systems: As above, systems otherwise reviewed and negative.     Medications, Allergies and Problem List   Patient Active Problem List   Diagnosis     Mixed hyperlipidemia     Osteoarthritis     Osteopenia     Coronary artery calcification     Current Outpatient Medications   Medication Sig Dispense Refill     ascorbic acid (ASCORBIC ACID WITH ELI HIPS) 500 MG tablet Take 500 mg by mouth daily.       b complex vitamins capsule Take 1 capsule by mouth daily.       CALCIUM CITRATE ORAL Take  "400 mg by mouth.       cetirizine (ZYRTEC) 5 MG tablet Use 5 mg As Directed daily.       DOCOSAHEXANOIC ACID/EPA (FISH OIL ORAL)        estradiol (CLIMARA) 0.0375 mg/24 hr Place 1 patch on the skin once a week. 12 patch 3     FLAXSEED OIL ORAL Take by mouth.       magnesium citrate 100 mg Tab Take 200 mg by mouth.       magnesium glycinate 100 mg Tab Take by mouth.       No current facility-administered medications for this visit.      No Known Allergies       Physical Exam     /80 (Patient Site: Left Arm, Patient Position: Sitting)   Pulse 66   Ht 5' 5.5\" (1.664 m)   Wt 132 lb (59.9 kg)   SpO2 98%   BMI 21.63 kg/m      General: This is an alert female in no apparent distress.     Additional Information   Social History     Tobacco Use     Smoking status: Never Smoker     Smokeless tobacco: Never Used   Substance Use Topics     Alcohol use: Not on file     Drug use: No        Delmy Ramirez MD    "

## 2021-06-03 VITALS
BODY MASS INDEX: 20.89 KG/M2 | DIASTOLIC BLOOD PRESSURE: 78 MMHG | RESPIRATION RATE: 16 BRPM | HEIGHT: 66 IN | HEART RATE: 72 BPM | SYSTOLIC BLOOD PRESSURE: 148 MMHG | WEIGHT: 130 LBS

## 2021-06-03 VITALS
HEART RATE: 66 BPM | BODY MASS INDEX: 21.21 KG/M2 | OXYGEN SATURATION: 98 % | SYSTOLIC BLOOD PRESSURE: 130 MMHG | WEIGHT: 132 LBS | DIASTOLIC BLOOD PRESSURE: 80 MMHG | HEIGHT: 66 IN

## 2021-06-03 VITALS — WEIGHT: 131 LBS | HEIGHT: 66 IN | BODY MASS INDEX: 21.05 KG/M2

## 2021-06-04 VITALS
BODY MASS INDEX: 21.05 KG/M2 | SYSTOLIC BLOOD PRESSURE: 118 MMHG | HEART RATE: 70 BPM | DIASTOLIC BLOOD PRESSURE: 74 MMHG | OXYGEN SATURATION: 95 % | HEIGHT: 66 IN | WEIGHT: 131 LBS

## 2021-06-05 VITALS
TEMPERATURE: 97.8 F | WEIGHT: 131 LBS | DIASTOLIC BLOOD PRESSURE: 84 MMHG | HEIGHT: 66 IN | OXYGEN SATURATION: 99 % | HEART RATE: 78 BPM | SYSTOLIC BLOOD PRESSURE: 112 MMHG | BODY MASS INDEX: 21.05 KG/M2

## 2021-06-05 VITALS
DIASTOLIC BLOOD PRESSURE: 80 MMHG | HEIGHT: 66 IN | OXYGEN SATURATION: 100 % | WEIGHT: 132 LBS | HEART RATE: 68 BPM | SYSTOLIC BLOOD PRESSURE: 112 MMHG | BODY MASS INDEX: 21.21 KG/M2

## 2021-06-09 NOTE — PATIENT INSTRUCTIONS - HE
Advance Directive  Patient s advance directive was discussed and I am comfortable with the patient s wishes.  Patient Education   Personalized Prevention Plan  You are due for the preventive services outlined below.  Your care team is available to assist you in scheduling these services.  If you have already completed any of these items, please share that information with your care team to update in your medical record.  Health Maintenance   Topic Date Due     HEPATITIS C SCREENING  1955     ZOSTER VACCINES (2 of 3) 10/25/2013     TD 18+ HE  12/22/2019     DXA SCAN  07/25/2020     INFLUENZA VACCINE RULE BASED (1) 08/01/2020     MEDICARE ANNUAL WELLNESS VISIT  07/16/2021     FALL RISK ASSESSMENT  07/16/2021     MAMMOGRAM  09/12/2021     ADVANCE CARE PLANNING  07/12/2024     LIPID  10/14/2024     COLORECTAL CANCER SCREENING  12/08/2026     PNEUMOCOCCAL IMMUNIZATION 65+ LOW/MEDIUM RISK  Completed     HEPATITIS B VACCINES  Aged Out

## 2021-06-09 NOTE — PROGRESS NOTES
Assessment and Plan:     1. Wellness examination  Her examination is normal today.  She is going to schedule a mammogram.  She is fasting and we will do her lipids, screening for hepatitis C, thyroid, and metabolic panel today.  We did review age-appropriate health maintenance and she is otherwise up-to-date.  We reviewed the documentation for her wellness visit and she has no difficulties with activities of daily living.  No recent falls.  She has an advanced directive.  No problems with memory.    2. Mixed hyperlipidemia  - HM2(CBC w/o Differential)  - Lipid Cascade  - Comprehensive Metabolic Panel  - Thyroid Cascade    3. Need for hepatitis C screening test  - Hepatitis C Antibody (Anti-HCV)    4. Menopause  - estradioL (CLIMARA) 0.0375 mg/24 hr; Place 1 patch on the skin once a week.  Dispense: 12 patch; Refill: 3    The patient's current medical problems were reviewed.    I have had an Advance Directives discussion with the patient.  The following health maintenance schedule was reviewed with the patient and provided in printed form in the after visit summary:   Health Maintenance   Topic Date Due     HEPATITIS C SCREENING  1955     ZOSTER VACCINES (2 of 3) 10/25/2013     TD 18+ HE  12/22/2019     DXA SCAN  07/25/2020     INFLUENZA VACCINE RULE BASED (1) 08/01/2020     MEDICARE ANNUAL WELLNESS VISIT  07/16/2021     FALL RISK ASSESSMENT  07/16/2021     MAMMOGRAM  09/12/2021     ADVANCE CARE PLANNING  07/12/2024     LIPID  10/14/2024     COLORECTAL CANCER SCREENING  12/08/2026     PNEUMOCOCCAL IMMUNIZATION 65+ LOW/MEDIUM RISK  Completed     HEPATITIS B VACCINES  Aged Out        Subjective:   Chief Complaint: Suzanne Malik is an 65 y.o. adult here for an Annual Wellness visit.   HPI: She comes in for a physical.  She has no acute concerns today.  She has borderline hyperlipidemia and does have a history of an elevated calcium score on CT.  She saw cardiology last year and they recommended that she  continue with exercise.  She does exercise frequently.  She has no problems with chest pain or palpitations.  Her blood pressure is excellent.  Her only medication is hormone replacement and she will continue on that.  She has had a hysterectomy in the past.  She is due for mammogram in the next month or so and is planning to set that up.  She is fasting today and will do lab work.  We did review age-appropriate health maintenance and she is otherwise up-to-date.  She has no problems with activities of daily living and no history of falls.  She has an advance directive.  No signs of memory loss.    Review of Systems:   Please see above.  The rest of the review of systems are negative for all systems.    Patient Care Team:  Delmy Ramirez MD as PCP - General (Internal Medicine)  Delmy Ramirez MD as Assigned PCP     Patient Active Problem List   Diagnosis     Mixed hyperlipidemia     Osteoarthritis     Osteopenia     Coronary artery calcification     Past Medical History:   Diagnosis Date     Basal cell cancer 2014     Breast cyst 2008    Left breast     Breast cyst 2011    Right breast      Past Surgical History:   Procedure Laterality Date     BASAL CELL CARCINOMA EXCISION  2014    left shoulder     BREAST CYST ASPIRATION Left 2008     BREAST CYST ASPIRATION Right 2011     ID APPENDECTOMY      Description: Appendectomy;  Proc Date: 01/01/1963;     ID REMOVAL OF TONSILS,<13 Y/O      Description: Tonsillectomy;  Proc Date: 01/01/1957;     ID TOTAL ABDOM HYSTERECTOMY  1998    for adenomyosis      Family History   Problem Relation Age of Onset     Hypertension Mother         alive in her 80s     Hyperlipidemia Mother      Heart disease Father         alive in his 80s     Deep vein thrombosis Father      Pulmonary embolism Father      No Medical Problems Brother       Social History     Socioeconomic History     Marital status:      Spouse name: Not on file     Number of children: 0     Years of  education: Not on file     Highest education level: Not on file   Occupational History     Occupation: retired   Social Needs     Financial resource strain: Not on file     Food insecurity     Worry: Not on file     Inability: Not on file     Transportation needs     Medical: Not on file     Non-medical: Not on file   Tobacco Use     Smoking status: Never Smoker     Smokeless tobacco: Never Used   Substance and Sexual Activity     Alcohol use: Not on file     Drug use: No     Sexual activity: Never   Lifestyle     Physical activity     Days per week: Not on file     Minutes per session: Not on file     Stress: Not on file   Relationships     Social connections     Talks on phone: Not on file     Gets together: Not on file     Attends [employer] service: Not on file     Active member of club or organization: Not on file     Attends meetings of clubs or organizations: Not on file     Relationship status: Not on file     Intimate partner violence     Fear of current or ex partner: Not on file     Emotionally abused: Not on file     Physically abused: Not on file     Forced sexual activity: Not on file   Other Topics Concern     Not on file   Social History Narrative    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  She lives alone and does not have children and enjoys outdoor activities.      Current Outpatient Medications   Medication Sig Dispense Refill     ascorbic acid (ASCORBIC ACID WITH ELI HIPS) 500 MG tablet Take 500 mg by mouth daily.       b complex vitamins capsule Take 1 capsule by mouth daily.       CALCIUM CITRATE ORAL Take 400 mg by mouth.       cetirizine (ZYRTEC) 5 MG tablet Use 5 mg As Directed daily.       DOCOSAHEXANOIC ACID/EPA (FISH OIL ORAL) Take 1 capsule by mouth 4 (four) times a week.              estradioL (CLIMARA) 0.0375 mg/24 hr Place 1 patch on the skin once a week. 12 patch 3     FLAXSEED OIL ORAL Take by mouth daily.              magnesium  "citrate 100 mg Tab Take 300 mg by mouth daily.              magnesium glycinate 100 mg Tab Take by mouth.       No current facility-administered medications for this visit.       Objective:   Vital Signs:   Visit Vitals  /74 (Patient Site: Left Arm, Patient Position: Sitting, Cuff Size: Adult Regular)   Pulse 70   Ht 5' 5.5\" (1.664 m)   Wt 131 lb (59.4 kg)   SpO2 95%   BMI 21.47 kg/m           VisionScreening:  No exam data present     PHYSICAL EXAM  General:  Patient is alert and in no apparent distress.  Neck:  Supple with no adenopathy or thyroid abnormality noted.  Cardiovascular:  Regular rate and rhythm, normal S1/S2, no murmurs, rubs, or gallop.  Pulmonary:  Lungs are clear to auscultation bilaterally with normal respiratory effort.  Gastrointestinal:  Abdomen is soft, non-tender, non-distended, with no organomegaly, rebound or guarding.  Extremities:  No joint abnormalities with no LE edema.  Strong dorsalis pedis pulses.  Neurologic Cranial nerves are intact.  No focal deficits.  Psychiatric:  Pleasant, no confusion or agitation   Skin:  Warm and well perfused with no rashes or abnormalities.  Breast: Normal breast tissue with no masses or abnormalities.      Assessment Results 7/16/2020   Activities of Daily Living No help needed   Instrumental Activities of Daily Living No help needed   Get Up and Go Score Less than 12 seconds   Mini Cog Total Score 5   Some recent data might be hidden     A Mini-Cog score of 0-2 suggests the possibility of dementia, score of 3-5 suggests no dementia        Identified Health Risks:     Patient's advanced directive was discussed and I am comfortable with the patient's wishes.        "

## 2021-06-10 NOTE — PROGRESS NOTES
"Chief complaint: Here for a physical    History of present illness:   Suzanne comes in today for a general physical.  Her dad had blood clots this spring, it sounds as though these occurred due to sedentary lifestyle.  She is feeling well herself.  She denies any new problems or concerns.  She stays physically active.    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  She lives alone and does not have children and enjoys outdoor activities.       Family history:    Family History   Problem Relation Age of Onset     Hypertension Mother      alive in her 80s     Heart disease Father      alive in his 80s     Deep vein thrombosis Father      Pulmonary embolism Father      No Medical Problems Brother        Review of systems:   Please see above,  The rest of the review of systems are negative for all systems.    Current allergies, medications, and problem list are all reviewed and updated in the chart.    Physical exam:  Vitals:    05/03/17 0917   BP: 108/70   Pulse: 98   Resp: 10   Weight: 131 lb (59.4 kg)   Height: 5' 5.75\" (1.67 m)     Body mass index is 21.31 kg/(m^2).  General Appearance:  Alert, cooperative, no distress, appears stated age   Head:  Normocephalic, without obvious abnormality, atraumatic   Eyes:  PERRL, conjunctiva/corneas clear, EOM's intact   Ears:  Normal TM's and external ear canals, both ears   Nose: Nares normal, no drainage    Throat: Lips, mucosa, and tongue normal; teeth and gums normal   Neck: Supple, symmetrical, trachea midline, no adenopathy;  thyroid: not enlarged, symmetric, no tenderness/mass/nodules; no carotid bruit   Back:   Symmetric, no curvature, ROM normal   Lungs:   Clear to auscultation bilaterally, respirations unlabored   Breasts:  No breast masses, tenderness, asymmetry, or nipple discharge.   Heart:  Regular rate and rhythm, S1 and S2 normal, no murmur, rub, or " gallop   Abdomen:   Soft, non-tender, positive bowel sounds, no masses, no organomegaly   Extremities: Extremities normal, atraumatic, no cyanosis or edema   Skin: Skin color, texture, turgor normal, no rashes or lesions   Lymph nodes: Cervical, supraclavicular, and axillary nodes normal   Neurologic:  nonfocal with facial symmetry      Assessment and plan:  1. Health care maintenance  She had a colonoscopy last December, mammogram is due later this month and she has had a hysterectomy does not need Pap smears.    2. Menopause  Would like to continue this as she feels well.  She likes the patch better than the pill.  - estradiol (CLIMARA) 0.05 mg/24 hr; apply 1 patch weekly as directed  Dispense: 12 patch; Refill: 3    3. Essential hypertension with goal blood pressure less than 140/90  Pressures quite well controlled on a small dose of lisinopril.  - Basic Metabolic Panel  - HM2(CBC w/o Differential)  - Thyroid Stimulating Hormone (TSH)  - Urinalysis-UC if Indicated    4. Mixed hyperlipidemia  She added in some mixed chain triglycerides.  - Hepatic Profile  - Lipid [place]      Becky Latham MD  Internal and Geriatric Medicine  [facility]  5/3/2017    Much or all of the text in this note was generated through the use of Dragon Dictate voice-to-text software. Errors in spelling or words which seem out of context are unintentional. Sound alike errors, in particular, may have escaped editing.

## 2021-06-11 NOTE — TELEPHONE ENCOUNTER
Later in the evening before labor day, had nausea and loose stool. Ate red onions in guacamole and scrambled eggs. Stomach hurts.  Day before had margaritas. Had food poisoning in the past. This time eating soup, crackers, added regular food and got bad stomach cramps. Stuck with bland diet. Still has nausea. Last night ate some meatloaf and stomach still hurts. I connected her with scheduling for an appointment today.  Kimberly Erazo RN  [facility]      Reason for Disposition    MODERATE OR MILD pain that comes and goes (cramps) lasts > 24 hours    Additional Information    Negative: Passed out (i.e., fainted, collapsed and was not responding)    Negative: Shock suspected (e.g., cold/pale/clammy skin, too weak to stand, low BP, rapid pulse)    Negative: Sounds like a life-threatening emergency to the triager    Negative: Chest pain    Negative: Pain is mainly in upper abdomen (if needed ask: 'is it mainly above the belly button?')    Negative: Abdominal pain and pregnant > 20 weeks    Negative: Abdominal pain and pregnant < 20 weeks    Negative: SEVERE abdominal pain (e.g., excruciating)     Pain at 3, nausea, with cramping at 9.    Negative: Vomiting red blood or black (coffee ground) material    Negative: Bloody, black, or tarry bowel movements    Negative: Constant abdominal pain lasting > 2 hours    Negative: Vomiting bile (green color)    Negative: Patient sounds very sick or weak to the triager    Negative: Vomiting and abdomen looks much more swollen than usual    Negative: White of the eyes have turned yellow (i.e., jaundice)    Negative: Blood in urine (red, pink, or tea-colored)    Negative: Fever > 103 F (39.4 C)    Negative: Fever > 101 F (38.3 C) and over 60 years of age    Negative: Fever > 100.0 F (37.8 C) and has diabetes mellitus or a weak immune system (e.g., HIV positive, cancer chemotherapy, organ transplant, splenectomy, chronic steroids)    Negative: Fever > 100.0 F (37.8 C) and  bedridden (e.g., nursing home patient, stroke, chronic illness, recovering from surgery)    Negative: Pregnant or could be pregnant (i.e., missed last menstrual period)    Protocols used: ABDOMINAL PAIN - FEMALE-A-[place]

## 2021-06-11 NOTE — PROGRESS NOTES
Office Visit   Suzanne Malik   65 y.o. adult    Date of Visit: 9/24/2020    Chief Complaint   Patient presents with     Nausea     Since Labor Day        Assessment and Plan   1. Abdominal pain, epigastric  She has had nausea and abdominal discomfort now for the last.  Certain foods make it worse.  The discomfort is in the mid epigastric area.  She has not had any black stool or blood in her stool.  Initially she had a little bit of diarrhea but that has not continued.  We did discuss that possibilities include gallstones, gastritis, or ulcer.  I am going to check some blood work today and set her up for a right upper quadrant ultrasound.  In addition I am going to start her on Protonix daily.  I will get back to her with her test results.  We will plan to see her back in 2 to 3 weeks to reassess.  If her symptoms worsen in the meantime she will let me know.  - HM2(CBC w/o Differential)  - Comprehensive Metabolic Panel  - US Abdomen Limited; Future  - COVID-19 Virus (Coronavirus) Antibody & Titer Reflex; Future  - pantoprazole (PROTONIX) 40 MG tablet; Take 1 tablet (40 mg total) by mouth daily.  Dispense: 90 tablet; Refill: 3  - COVID-19 Virus (Coronavirus) Antibody & Titer Reflex    2. Nausea  - HM2(CBC w/o Differential)  - Comprehensive Metabolic Panel  - US Abdomen Limited; Future  - COVID-19 Virus (Coronavirus) Antibody & Titer Reflex; Future  - pantoprazole (PROTONIX) 40 MG tablet; Take 1 tablet (40 mg total) by mouth daily.  Dispense: 90 tablet; Refill: 3  - COVID-19 Virus (Coronavirus) Antibody & Titer Reflex         Return in about 2 weeks (around 10/8/2020) for with me, Follow up.     History of Present Illness   This 65 y.o. old female comes in due to nausea and abdominal discomfort.  Her symptoms have been going on for about 4 weeks.  Initially she developed nausea and felt like she needed to throw up.  She had loose watery stools one evening.  Since then she has not had any more diarrhea and no  "black or tarry stools.  She does continue to have mid epigastric abdominal discomfort as well as nausea.  Her appetite is been decreased.  She has been following a bland diet for the entire time.  If she eats anything more rich she gets discomfort in the abdomen and worsening nausea.  She has not had any fevers or chills and no vomiting.  She has no other acute concerns.    Review of Systems: As above, systems otherwise reviewed and negative.     Medications, Allergies and Problem List   Patient Active Problem List   Diagnosis     Mixed hyperlipidemia     Osteoarthritis     Osteopenia     Coronary artery calcification     Current Outpatient Medications   Medication Sig Dispense Refill     ascorbic acid (ASCORBIC ACID WITH ELI HIPS) 500 MG tablet Take 500 mg by mouth daily.       b complex vitamins capsule Take 1 capsule by mouth daily.       CALCIUM CITRATE ORAL Take 400 mg by mouth.       cetirizine (ZYRTEC) 5 MG tablet Use 5 mg As Directed daily.       DOCOSAHEXANOIC ACID/EPA (FISH OIL ORAL) Take 1 capsule by mouth 4 (four) times a week.              estradioL (CLIMARA) 0.0375 mg/24 hr Place 1 patch on the skin once a week. 12 patch 3     FLAXSEED OIL ORAL Take by mouth daily.              magnesium citrate 100 mg Tab Take 300 mg by mouth daily.              magnesium glycinate 100 mg Tab Take by mouth.       pantoprazole (PROTONIX) 40 MG tablet Take 1 tablet (40 mg total) by mouth daily. 90 tablet 3     No current facility-administered medications for this visit.      No Known Allergies       Physical Exam     /80 (Patient Site: Right Arm, Patient Position: Sitting)   Pulse 68   Ht 5' 5.5\" (1.664 m)   Wt 132 lb (59.9 kg)   SpO2 100%   BMI 21.63 kg/m      General:  Patient is alert and in no apparent distress.  Neck:  Supple with no adenopathy or thyroid abnormality noted.  Cardiovascular:  Regular rate and rhythm, normal S1/S2, no murmurs, rubs, or gallop.  Pulmonary:  Lungs are clear to auscultation " bilaterally with normal respiratory effort.  Gastrointestinal:  Abdomen is soft, non-distended, with no organomegaly, rebound or guarding.  She has mild tenderness to deep palpation in the mid epigastric area.           Additional Information   Social History     Tobacco Use     Smoking status: Never Smoker     Smokeless tobacco: Never Used   Substance Use Topics     Alcohol use: Not on file     Drug use: No            Delmy Ramirez MD

## 2021-06-12 NOTE — PROGRESS NOTES
Office Visit   Suzanne Malik   65 y.o. adult    Date of Visit: 10/13/2020    Chief Complaint   Patient presents with     Follow-up     3 week follow up        Assessment and Plan   1. Gastroesophageal reflux disease without esophagitis  Her symptoms are improving but continue.  Her ultrasound did not show gallstones.  I do think that it would be worth setting her up for an endoscopy and we will go ahead and order that.  She is in agreement with this plan.  She will continue with the Protonix.  - Ambulatory referral for Upper GI Endoscopy    2. Pelvic fullness  Her pelvic exam is satisfactory.  She has mild vaginal prolapse.  No masses or abnormalities are noted in the pelvis.    3. Flu vaccine need  - Influenza,Quad,High Dose,PF 65 YR+         No follow-ups on file.     History of Present Illness   This 65 y.o. old female comes in to follow-up.  For the last couple of months she has been having significant nausea and dyspepsia.  We did an ultrasound and she does not have gallstones.  We started her on pantoprazole a couple weeks ago.  She has noted some improvement but continues to have significant nausea and dyspepsia with certain foods.  Is been difficult to maintain her weight.  She has not had any black stools or tarry stools.  She also notes a pelvic fullness that she felt a couple weeks ago.  She has had a hysterectomy.  She has no other acute concerns today.    Review of Systems: As above, systems otherwise reviewed and negative.     Medications, Allergies and Problem List   Patient Active Problem List   Diagnosis     Mixed hyperlipidemia     Osteoarthritis     Osteopenia     Coronary artery calcification     Current Outpatient Medications   Medication Sig Dispense Refill     ascorbic acid (ASCORBIC ACID WITH ELI HIPS) 500 MG tablet Take 500 mg by mouth daily.       b complex vitamins capsule Take 1 capsule by mouth daily.       CALCIUM CITRATE ORAL Take 400 mg by mouth.       cetirizine (ZYRTEC) 5 MG  "tablet Use 5 mg As Directed daily.       DOCOSAHEXANOIC ACID/EPA (FISH OIL ORAL) Take 1 capsule by mouth 4 (four) times a week.              estradioL (CLIMARA) 0.0375 mg/24 hr Place 1 patch on the skin once a week. 12 patch 3     FLAXSEED OIL ORAL Take by mouth daily.              magnesium citrate 100 mg Tab Take 300 mg by mouth daily.              magnesium glycinate 100 mg Tab Take by mouth.       pantoprazole (PROTONIX) 40 MG tablet Take 1 tablet (40 mg total) by mouth daily. 90 tablet 3     No current facility-administered medications for this visit.      No Known Allergies       Physical Exam     /84 (Patient Site: Right Arm, Patient Position: Sitting)   Pulse 78   Temp 97.8  F (36.6  C)   Ht 5' 5.5\" (1.664 m)   Wt 131 lb (59.4 kg)   SpO2 99%   BMI 21.47 kg/m      General: This is an alert female, sitting comfortably, no apparent distress.  Pelvic: Normal external genitalia and normal hair distribution.  With Valsalva there is some visible vaginal prolapse.  Bimanual exam with no pelvic masses or abnormalities.     Additional Information   Social History     Tobacco Use     Smoking status: Never Smoker     Smokeless tobacco: Never Used   Substance Use Topics     Alcohol use: Not on file     Drug use: No            Delmy Ramirez MD    "

## 2021-06-17 NOTE — TELEPHONE ENCOUNTER
Telephone Encounter by Nery Dominguez MD at 5/6/2021  1:55 PM     Author: Nery Dominguez MD Service: -- Author Type: Physician    Filed: 5/6/2021  1:55 PM Encounter Date: 5/6/2021 Status: Signed    : Nery Dominguez MD (Physician)    From: Suzanne Malik  Sent: 4/23/2021  7:29 AM CDT  To: Nery Dominguez MD  Subject: RE:please schedule your covid vaccine appointment    I have gotten both shots of the Moderna vaccine.

## 2021-06-19 NOTE — PROGRESS NOTES
Assessment and Plan:   Overall she is doing quite well.  Discussed doing a coronary calcium score due to her underlying hyperlipidemia which she will probably do.    1. Welcome to Medicare preventive visit  Bone density scan is due.  First pneumonia vaccine given today, shingles vaccine recommended, mammogram was in June and colonoscopy was in 2016.  - Electrocardiogram Perform and Read    2. Menopause  - DXA Bone Density Scan; Future    3. Hypertension  Blood pressure is on the low end of normal, she is having some potential pulmonary difficulties due to lisinopril and recommended she stop the drug for now and monitor her blood pressure.  Would recommend a different agent if she has recurrent hypertension.  - Basic Metabolic Panel  - HM2(CBC w/o Differential)  - Thyroid Stimulating Hormone (TSH)  - Urinalysis-UC if Indicated    4. Vitamin D deficiency  - Vitamin D, Total (25-Hydroxy)    5. Hyperlipidemia, unspecified hyperlipidemia type  - Hepatic Profile  - Lipid [place]    6. Encounter for general adult medical examination with abnormal findings     The patient's current medical problems were reviewed.      The following health maintenance schedule was reviewed with the patient and provided in printed form in the after visit summary:   Health Maintenance   Topic Date Due     PNEUMOCOCCAL POLYSACCHARIDE VACCINE AGE 65 AND OVER  05/04/2018     PNEUMOCOCCAL CONJUGATE VACCINE FOR ADULTS (PCV13 OR PREVNAR)  05/04/2018     DXA SCAN  06/01/2018     INFLUENZA VACCINE RULE BASED (1) 08/01/2018     FALL RISK ASSESSMENT  07/11/2019     TD 18+ HE  12/22/2019     MAMMOGRAM  06/06/2020     ADVANCE DIRECTIVES DISCUSSED WITH PATIENT  09/23/2020     COLONOSCOPY  12/08/2026     TDAP ADULT ONE TIME DOSE  Completed     ZOSTER VACCINE  Completed        Subjective:   Chief Complaint: Suzanne Malik is an 63 y.o. female here for a Welcome to Medicare visit.   HPI: She is doing quite well.  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  She recently bought a new bike and enjoys paddle boarding and stays physically active.  She was having some respiratory issues where she felt like she could not get enough air and thinks it could be her lisinopril.    Review of Systems:    Please see above.  The rest of the review of systems are negative for all systems.    Patient Care Team:  Becky Latham MD as PCP - General (Internal Medicine)     Patient Active Problem List   Diagnosis     Mixed hyperlipidemia     Osteoarthritis     Osteopenia     Hypertension     Past Medical History:   Diagnosis Date     Basal cell cancer 2014     Breast cyst 2008    Left breast     Breast cyst 2011    Right breast      Past Surgical History:   Procedure Laterality Date     BASAL CELL CARCINOMA EXCISION  2014    left shoulder     BREAST CYST ASPIRATION Left 2008     BREAST CYST ASPIRATION Right 2011     KS APPENDECTOMY      Description: Appendectomy;  Proc Date: 01/01/1963;     KS REMOVAL OF TONSILS,<11 Y/O      Description: Tonsillectomy;  Proc Date: 01/01/1957;     KS TOTAL ABDOM HYSTERECTOMY  1998    for adenomyosis      Family History   Problem Relation Age of Onset     Hypertension Mother      alive in her 80s     Heart disease Father      alive in his 80s     Deep vein thrombosis Father      Pulmonary embolism Father      No Medical Problems Brother       Social History     Social History     Marital status:      Spouse name: N/A     Number of children: N/A     Years of education: N/A     Occupational History     Not on file.     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  She lives alone and does not have children and enjoys outdoor activities.       Current  "Outpatient Prescriptions   Medication Sig Dispense Refill     ascorbic acid (ASCORBIC ACID WITH ELI HIPS) 500 MG tablet Take 500 mg by mouth daily.       b complex vitamins capsule Take 1 capsule by mouth daily.       CALCIUM CITRATE ORAL Take 400 mg by mouth.       cetirizine (ZYRTEC) 5 MG tablet Use 5 mg As Directed daily.       cholecalciferol, vitamin D3, (CHOLECALCIFEROL) 1,000 unit tablet Take 1,000 Units by mouth daily.       DOCOSAHEXANOIC ACID/EPA (FISH OIL ORAL)        estradiol (CLIMARA) 0.05 mg/24 hr APPLY 1 PATCH WEEKLY AS DIRECTED 12 patch 0     medium chain triglycerides (MCT OIL) 7.7 kcal/mL oil Take 5 mL by mouth daily.       turmeric root extract 500 mg cap Take by mouth.       No current facility-administered medications for this visit.       Objective:   Vital Signs:   Visit Vitals     /70 (Patient Site: Left Arm, Patient Position: Sitting, Cuff Size: Adult Regular)     Pulse 64     Ht 5' 6\" (1.676 m)     Wt 133 lb 1 oz (60.4 kg)     SpO2 99%     BMI 21.48 kg/m2        EKG:  Normal sinus rhythm with no acute ST-T changes    VisionScreening:   Visual Acuity Screening    Right eye Left eye Both eyes   Without correction:      With correction: 20/25 20/25 20/16        PHYSICAL EXAM  Wt Readings from Last 3 Encounters:   07/11/18 133 lb 1 oz (60.4 kg)   05/03/17 131 lb (59.4 kg)   11/01/16 132 lb 3.2 oz (60 kg)     General Appearance: Pleasant and alert  HEENT: Sclera are clear, tympanic membranes clear  Lungs: Normal respirations, clear to auscultation  Breasts: Normal-appearing breasts and nipples with no axillary adenopathy   Cardiac: Regular rate and rhythm with no appreciable murmur rub or gallop   Abdomen: Soft and nondistended  Extremities: No edema  Skin: No rashes  Neuro: Moves all extremities and has facial symmetry  Gait: Ambulates with a normal gait    Personalized prevention plan: Lifestyle interventions to promote self-management and wellness were discussed including good " nutrition, ongoing physical activity, falls prevention and continuing with screening tests as recommended including shingles vaccine, first pneumonia vaccine, bone density test and those listed in the health maintenance plan listed above.    Much or all of the text in this note was generated through the use of Dragon Dictate voice-to-text software. Errors in spelling or words which seem out of context are unintentional. Sound alike errors, in particular, may have escaped editing.      Assessment Results 7/11/2018   Activities of Daily Living No help needed   Instrumental Activities of Daily Living No help needed   Mini Cog Total Score 5   Some recent data might be hidden     A Mini Cog score of 0-2 suggests the possibility of dementia, score of 3-5 suggests no dementia    Identified Health Risks:     Patient's advanced directive was discussed and I am comfortable with the patient's wishes.

## 2021-06-21 NOTE — LETTER
Letter by Wayne Armstrong MD at      Author: Wayne Armstrong MD Service: -- Author Type: --    Filed:  Encounter Date: 4/29/2021 Status: (Other)         Suzanne Malik  [address] [place] [address]      April 29, 2021      Dear Suzanne,    This letter is to remind you that you are due for your follow up appointment with Dr. Wayne Armstrong  . To help ensure you are in the best health possible, a regular follow-up with your cardiologist is essential.     Please call our Patient Scheduling Line at [phone] to schedule your appointment at your earliest convenience.  If you have recently scheduled an appointment, please disregard this letter.    We look forward to seeing you again. As always, we are available at the number  above for any questions or concerns you may have.      Sincerely,     The Physicians and Staff of [facility]

## 2021-06-28 NOTE — PROGRESS NOTES
Progress Notes by Wayne Armstrong MD at 11/1/2019  1:30 PM     Author: Wayne Armstrong MD Service: -- Author Type: Physician    Filed: 11/1/2019  2:21 PM Encounter Date: 11/1/2019 Status: Signed    : Wayne Armstrong MD (Physician)               Assessment/Recommendations   Patient with known hyperlipidemia with a family history of coronary artery disease and peripheral vascular disease, and her father.  She has done a outstanding job of modifying her risk factors with vigorous exercise, ideal body weight, and an excellent diet.  She is consulted with [facility] nutritionist and exercise physiologist and continues to participate in their program.  Her LDL cholesterols 143 which is significantly down from her previous and she would like to continue to try to bring this down further on her own before starting medications.  She is reluctant to start medications.  She does have a calcium score of 19 so there is coronary plaque and we reviewed stable versus unstable plaque.  I told her my bias would be to put her on low-dose statins and see if she could tolerate them as they would likely bring her LDL cholesterol down below 100 which would stack the odds in her favor further to reduce her risk of cardiac events in the future.  She would like to try to continue her current diet and exercise and repeat her lipids in 3 to 5 months.  I am supportive of this.    We also talked about baby aspirin and given her plaque I do not think this would be unreasonable as she does not have any bleeding issues.  I think she would like to hold on that until she sees what happens with her lipids as well.    I have tentatively scheduled her for a one-year follow-up but of course would be happy to see her sooner if questions or problems arise.    Thank you for allowing us to participate in her care.       History of Present Illness/Subjective      Suzanne Malik is a 64 y.o. adult with known hyperlipidemia.  She has had  cholesterols in the 230 range and her LDL cholesterols have been in the high 100s but more recently she is undergone a vigorous diet, with consultation from nutritionist at [facility].  She is also been exercising and using an exercise physiologist at [facility] and has lost some weight.  She really is at ideal body weight currently.  She does not smoke, she is not diabetic, and she is never been treated for hypertension..  She has a family history which includes a father who had bypass surgery in his 70s and also had peripheral vascular disease and carotid artery disease.  Father is currently 89 and alive.  Mother is 88 and has high cholesterol at one point in time her cholesterol is high as 300.    The patient denies chest discomfort with activity or at any time and also denies unusual shortness of breath with activity.  She denies orthopnea, paroxysmal nocturnal dyspnea, peripheral edema, syncope near syncope and has no history of rheumatic fever, cerebrovascular accident or TIA.    She grew up in [place] and worked in [place] and when her company transferred to the [facility] she moved to the [facility].  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  She spent 2 years in [place] with the company.  She is now retired.  She is not  and does not have children.         Physical Examination Review of Systems   Vitals:    11/01/19 1322   BP: 148/78   Pulse: 72   Resp: 16     Body mass index is 21.3 kg/m .  Wt Readings from Last 3 Encounters:   11/01/19 130 lb (59 kg)   10/16/19 132 lb (59.9 kg)   07/12/19 131 lb (59.4 kg)     General Appearance:   Alert, cooperative and in no acute distress.   ENT/Mouth: Oral mucuos membranes pink and moist .      EYES:  no scleral icterus, normal conjunctivae   Neck: JVP normal. No Hepatojugular reflux. Thyroid not visualized.   Chest/Lungs:   Lungs are clear to auscultation, equal chest wall expansion.   Cardiovascular:   S1, S2 without  murmur ,clicks or rubs. Brachial, radial and posterior tibial pulses are intact and symetric. No carotid bruits noted   Abdomen:  Nontender. BS+. No bruits.   Extremities: No cyanosis, clubbing or edema   Skin: no xanthelasma, warm.    Neurologic: normal  bilateral, no tremors     Psychiatric: Appropriate affect.      General: WNL  Eyes: WNL  Ears/Nose/Throat: WNL  Lungs: WNL  Heart: WNL  Stomach: WNL  Bladder: WNL  Muscle/Joints: WNL  Skin: WNL  Nervous System: WNL  Mental Health: WNL     Blood: WNL       Medical History  Surgical History Family History Social History   Past Medical History:   Diagnosis Date   ? Basal cell cancer 2014   ? Breast cyst 2008    Left breast   ? Breast cyst 2011    Right breast    Past Surgical History:   Procedure Laterality Date   ? BASAL CELL CARCINOMA EXCISION  2014    left shoulder   ? BREAST CYST ASPIRATION Left 2008   ? BREAST CYST ASPIRATION Right 2011   ? MO APPENDECTOMY      Description: Appendectomy;  Proc Date: 01/01/1963;   ? MO REMOVAL OF TONSILS,<13 Y/O      Description: Tonsillectomy;  Proc Date: 01/01/1957;   ? MO TOTAL ABDOM HYSTERECTOMY  1998    for adenomyosis    Family History   Problem Relation Age of Onset   ? Hypertension Mother         alive in her 80s   ? Hyperlipidemia Mother    ? Heart disease Father         alive in his 80s   ? Deep vein thrombosis Father    ? Pulmonary embolism Father    ? No Medical Problems Brother     Social History     Socioeconomic History   ? Marital status:      Spouse name: Not on file   ? Number of children: 0   ? Years of education: Not on file   ? Highest education level: Not on file   Occupational History   ? Occupation: retired   Social Needs   ? Financial resource strain: Not on file   ? Food insecurity:     Worry: Not on file     Inability: Not on file   ? Transportation needs:     Medical: Not on file     Non-medical: Not on file   Tobacco Use   ? Smoking status: Never Smoker   ? Smokeless tobacco: Never Used    Substance and Sexual Activity   ? Alcohol use: Not on file   ? Drug use: No   ? Sexual activity: Never   Lifestyle   ? Physical activity:     Days per week: Not on file     Minutes per session: Not on file   ? Stress: Not on file   Relationships   ? Social connections:     Talks on phone: Not on file     Gets together: Not on file     Attends Latter-day service: Not on file     Active member of club or organization: Not on file     Attends meetings of clubs or organizations: Not on file     Relationship status: Not on file   ? Intimate partner violence:     Fear of current or ex partner: Not on file     Emotionally abused: Not on file     Physically abused: Not on file     Forced sexual activity: Not on file   Other Topics Concern   ? 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  She lives alone and does not have children and enjoys outdoor activities.          Medications  Allergies   Current Outpatient Medications   Medication Sig Dispense Refill   ? ascorbic acid (ASCORBIC ACID WITH ELI HIPS) 500 MG tablet Take 500 mg by mouth daily.     ? b complex vitamins capsule Take 1 capsule by mouth daily.     ? CALCIUM CITRATE ORAL Take 400 mg by mouth.     ? cetirizine (ZYRTEC) 5 MG tablet Use 5 mg As Directed daily.     ? DOCOSAHEXANOIC ACID/EPA (FISH OIL ORAL) Take 1 capsule by mouth 4 (four) times a week.            ? estradiol (CLIMARA) 0.0375 mg/24 hr Place 1 patch on the skin once a week. 12 patch 3   ? FLAXSEED OIL ORAL Take by mouth daily.            ? magnesium citrate 100 mg Tab Take 300 mg by mouth daily.            ? magnesium glycinate 100 mg Tab Take by mouth.       No current facility-administered medications for this visit.     No Known Allergies      Lab Results    Chemistry/lipid CBC Cardiac Enzymes/BNP/TSH/INR   Lab Results   Component Value Date    CHOL 229 (H) 10/14/2019    HDL 68 10/14/2019    LDLCALC 143 (H)  10/14/2019    TRIG 89 10/14/2019    CREATININE 0.97 10/14/2019    BUN 14 10/14/2019    K 4.7 10/14/2019     10/14/2019     10/14/2019    CO2 29 10/14/2019    Lab Results   Component Value Date    WBC 4.7 10/14/2019    HGB 14.6 10/14/2019    HCT 42.4 10/14/2019    MCV 97 10/14/2019     10/14/2019    Lab Results   Component Value Date    TSH 1.36 07/11/2018

## 2021-07-03 NOTE — ADDENDUM NOTE
Addendum Note by Cb Ramirez MD at 9/24/2020  3:20 PM     Author: Cb Ramirez MD Service: -- Author Type: Physician    Filed: 9/25/2020  1:17 PM Encounter Date: 9/24/2020 Status: Signed    : Cb Ramirez MD (Physician)    Addended by: CB RAMIREZ on: 9/25/2020 01:17 PM        Modules accepted: Orders

## 2021-07-16 ENCOUNTER — TRANSFERRED RECORDS (OUTPATIENT)
Dept: HEALTH INFORMATION MANAGEMENT | Facility: CLINIC | Age: 66
End: 2021-07-16

## 2021-07-19 ENCOUNTER — OFFICE VISIT (OUTPATIENT)
Dept: FAMILY MEDICINE | Facility: CLINIC | Age: 66
End: 2021-07-19
Payer: COMMERCIAL

## 2021-07-19 VITALS
OXYGEN SATURATION: 98 % | HEART RATE: 84 BPM | HEIGHT: 65 IN | SYSTOLIC BLOOD PRESSURE: 130 MMHG | DIASTOLIC BLOOD PRESSURE: 78 MMHG | WEIGHT: 133.6 LBS | BODY MASS INDEX: 22.26 KG/M2

## 2021-07-19 DIAGNOSIS — Z00.00 ENCOUNTER FOR MEDICARE ANNUAL WELLNESS EXAM: Primary | ICD-10-CM

## 2021-07-19 DIAGNOSIS — E78.2 MIXED HYPERLIPIDEMIA: ICD-10-CM

## 2021-07-19 DIAGNOSIS — I10 ESSENTIAL HYPERTENSION, BENIGN: ICD-10-CM

## 2021-07-19 DIAGNOSIS — N95.9 MENOPAUSAL AND POSTMENOPAUSAL DISORDER: ICD-10-CM

## 2021-07-19 DIAGNOSIS — Z13.29 SCREENING FOR THYROID DISORDER: ICD-10-CM

## 2021-07-19 PROBLEM — M19.90 OSTEOARTHROSIS: Status: ACTIVE | Noted: 2021-07-19

## 2021-07-19 LAB
ALBUMIN SERPL-MCNC: 4.1 G/DL (ref 3.5–5)
ALP SERPL-CCNC: 67 U/L (ref 45–120)
ALT SERPL W P-5'-P-CCNC: 17 U/L (ref 0–45)
ANION GAP SERPL CALCULATED.3IONS-SCNC: 9 MMOL/L (ref 5–18)
AST SERPL W P-5'-P-CCNC: 25 U/L (ref 0–40)
BILIRUB SERPL-MCNC: 0.7 MG/DL (ref 0–1)
BUN SERPL-MCNC: 14 MG/DL (ref 8–22)
CALCIUM SERPL-MCNC: 9.7 MG/DL (ref 8.5–10.5)
CHLORIDE BLD-SCNC: 104 MMOL/L (ref 98–107)
CHOLEST SERPL-MCNC: 240 MG/DL
CO2 SERPL-SCNC: 29 MMOL/L (ref 22–31)
CREAT SERPL-MCNC: 0.78 MG/DL (ref 0.6–1.3)
FASTING STATUS PATIENT QL REPORTED: YES
GFR SERPL CREATININE-BSD FRML MDRD: 78 ML/MIN/1.73M2
GLUCOSE BLD-MCNC: 86 MG/DL (ref 70–125)
HDLC SERPL-MCNC: 72 MG/DL
LDLC SERPL CALC-MCNC: 154 MG/DL
POTASSIUM BLD-SCNC: 4.4 MMOL/L (ref 3.5–5)
PROT SERPL-MCNC: 7 G/DL (ref 6–8)
SODIUM SERPL-SCNC: 142 MMOL/L (ref 136–145)
TRIGL SERPL-MCNC: 71 MG/DL
TSH SERPL DL<=0.005 MIU/L-ACNC: 1 UIU/ML (ref 0.3–5)

## 2021-07-19 PROCEDURE — 99213 OFFICE O/P EST LOW 20 MIN: CPT | Mod: 25 | Performed by: FAMILY MEDICINE

## 2021-07-19 PROCEDURE — 36415 COLL VENOUS BLD VENIPUNCTURE: CPT | Performed by: FAMILY MEDICINE

## 2021-07-19 PROCEDURE — 80061 LIPID PANEL: CPT | Performed by: FAMILY MEDICINE

## 2021-07-19 PROCEDURE — 84443 ASSAY THYROID STIM HORMONE: CPT | Performed by: FAMILY MEDICINE

## 2021-07-19 PROCEDURE — 80053 COMPREHEN METABOLIC PANEL: CPT | Performed by: FAMILY MEDICINE

## 2021-07-19 PROCEDURE — 99397 PER PM REEVAL EST PAT 65+ YR: CPT | Performed by: FAMILY MEDICINE

## 2021-07-19 RX ORDER — ESTRADIOL 0.03 MG/D
1 PATCH TRANSDERMAL WEEKLY
Qty: 12 PATCH | Refills: 3 | Status: SHIPPED | OUTPATIENT
Start: 2021-07-19 | End: 2022-07-23

## 2021-07-19 RX ORDER — CALCIUM CARBONATE 260MG(650)
300 TABLET,CHEWABLE ORAL
COMMUNITY
End: 2022-03-29

## 2021-07-19 RX ORDER — ESTRADIOL 0.04 MG/D
1 PATCH TRANSDERMAL
COMMUNITY
Start: 2020-07-16 | End: 2021-07-19

## 2021-07-19 ASSESSMENT — MIFFLIN-ST. JEOR: SCORE: 1140.85

## 2021-07-19 ASSESSMENT — ACTIVITIES OF DAILY LIVING (ADL): CURRENT_FUNCTION: NO ASSISTANCE NEEDED

## 2021-07-19 NOTE — PROGRESS NOTES
"px  SUBJECTIVE:   Suzanne Malik is a 66 year old adult who presentsAnswers for HPI/ROS submitted by the patient on 7/19/2021  In general, how would you rate your overall physical health?: excellent  Frequency of exercise:: 4-5 days/week  Do you usually eat at least 4 servings of fruit and vegetables a day, include whole grains & fiber, and avoid regularly eating high fat or \"junk\" foods? : Yes  Taking medications regularly:: Yes  Medication side effects:: None  Activities of Daily Living: no assistance needed  Home safety: no safety concerns identified  Hearing Impairment:: no hearing concerns  In the past 6 months, have you been bothered by leaking of urine?: No  In general, how would you rate your overall mental or emotional health?: good  Additional concerns today:: No  Duration of exercise:: 45-60 minutes     for Preventive Visit.      Patient has been advised of split billing requirements and indicates understanding: Yes  Are you in the first 12 months of your Medicare Part B coverage?  No    Physical Health:    In general, how would you rate your overall physical health? good    Outside of work, how many days during the week do you exercise? 2-3 days/week    Outside of work, approximately how many minutes a day do you exercise?15-30 minutes    If you drink alcohol do you typically have >3 drinks per day or >7 drinks per week? No    Do you usually eat at least 4 servings of fruit and vegetables a day, include whole grains & fiber and avoid regularly eating high fat or \"junk\" foods? Yes    Do you have any problems taking medications regularly?  No    Do you have any side effects from medications? none    Needs assistance for the following daily activities: no assistance needed    Which of the following safety concerns are present in your home?  none identified     Hearing impairment: No    In the past 6 months, have you been bothered by leaking of urine? no    Mental Health:    In general, how would you rate " your overall mental or emotional health? excellent  PHQ-2 Score: (P) 0    Do you feel safe in your environment? Yes    Have you ever done Advance Care Planning? (For example, a Health Directive, POLST, or a discussion with a medical provider or your loved ones about your wishes): Yes, advance care planning is on file.    Additional concerns to address?  YES        Cognitive Screenin) Repeat 3 items (Leader, Season, Table)    2) Clock draw: NORMAL  3) 3 item recall: Recalls 3 objects  Results: 3 items recalled: COGNITIVE IMPAIRMENT LESS LIKELY    Mini-CogTM Copyright S Margot. Licensed by the author for use in [facility]; reprinted with permission (soob@[facility]). All rights reserved.      Do you have sleep apnea, excessive snoring or daytime drowsiness?: no        Reviewed and updated as needed this visit by clinical staff   Allergies  Meds              Reviewed and updated as needed this visit by Provider                Social History     Tobacco Use     Smoking status: Never Smoker     Smokeless tobacco: Never Used   Substance Use Topics     Alcohol use: Not on file     Comment: occasional                           Current providers sharing in care for this patient include:   Patient Care Team:  Delmy Ramirez MD as PCP - General (Internal Medicine)  Delmy Ramirez MD as Assigned PCP    The following health maintenance items are reviewed in Epic and correct as of today:  Health Maintenance   Topic Date Due     ANNUAL REVIEW OF HM ORDERS  Never done     ZOSTER IMMUNIZATION (2 of 3) 10/25/2013     FALL RISK ASSESSMENT  2021     INFLUENZA VACCINE (1) 2021     MEDICARE ANNUAL WELLNESS VISIT  2022     MAMMO SCREENING  09/15/2022     LIPID  2025     ADVANCE CARE PLANNING  2025     COLORECTAL CANCER SCREENING  2026     DTAP/TDAP/TD IMMUNIZATION (4 - Td or Tdap) 2030     DEXA  2033     HEPATITIS C SCREENING  Completed     PHQ-2  Completed      Pneumococcal Vaccine: 65+ Years  Completed     COVID-19 Vaccine  Completed     IPV IMMUNIZATION  Aged Out     MENINGITIS IMMUNIZATION  Aged Out     HEPATITIS B IMMUNIZATION  Aged Out     Lab work is in process  Labs reviewed in EPIC  BP Readings from Last 3 Encounters:   07/19/21 130/78   10/13/20 112/84   09/24/20 112/80    Wt Readings from Last 3 Encounters:   07/19/21 60.6 kg (133 lb 9.6 oz)   10/13/20 59.4 kg (131 lb)   09/24/20 59.9 kg (132 lb)                  Patient Active Problem List   Diagnosis     Essential hypertension, benign     Menopausal and postmenopausal disorder     Allergic state     Diffuse cystic mastopathy     Migraine     Mixed hyperlipidemia     Osteoarthrosis     Past Surgical History:   Procedure Laterality Date     BASAL CELL CARCINOMA EXCISION  2014    left shoulder     BREAST CYST ASPIRATION Left 2008     BREAST CYST ASPIRATION Right 2011     C APPENDECTOMY  8 years old     C APPENDECTOMY      Description: Appendectomy;  Proc Date: 01/01/1963;     C TOTAL ABDOM HYSTERECTOMY  1998    for adenomyosis     C VAG HYST,RMV TUBE/OVARY  01/1998     HC REMOVAL OF TONSILS,<11 Y/O      Description: Tonsillectomy;  Proc Date: 01/01/1957;     HC REMOVE TONSILS/ADENOIDS,<11 Y/O       HYSTERECTOMY       OOPHORECTOMY         Social History     Tobacco Use     Smoking status: Never Smoker     Smokeless tobacco: Never Used   Substance Use Topics     Alcohol use: Not on file     Comment: 1-2/week     Family History   Problem Relation Age of Onset     Hypertension Father      C.A.D. Father         S/P bypass surgery     Cerebrovascular Disease Maternal Grandmother      Cerebrovascular Disease Maternal Grandfather      Hypertension Mother         alive in her 80s     Hyperlipidemia Mother      Heart Disease Father         alive in his 80s     Deep Vein Thrombosis Father      Pulmonary Embolism Father      No Known Problems Brother          Current Outpatient Medications   Medication Sig Dispense Refill  "    CALCIUM + D OR 1 TABLET DAILY       COD LIVER OIL OR 1 tab daily       estradiol (FEMPATCH) 0.025 MG/24HR weekly patch Place 1 patch onto the skin once a week 12 patch 3     FLAXSEED (LINSEED) OR POWD ground-1tbls in am       Magnesium Citrate 100 MG TABS Take 300 mg by mouth       MULTI-DAY OR 1 TABLET DAILY       PATADAY 0.2 % OP SOLN 1 drop each eye daily as needed       VITAMIN B COMPLEX OR None Entered       VITAMIN C 500 MG OR TABS 1 TABLET TWICE DAILY       No Known Allergies  Mammogram Screening: Mammogram Screening: Recommended mammography every 1-2 years with patient discussion and risk factor consideration    ROS:  Constitutional, HEENT, cardiovascular, pulmonary, gi and gu systems are negative, except as otherwise noted.    OBJECTIVE:   /78 (BP Location: Left arm, Patient Position: Sitting, Cuff Size: Adult Regular)   Pulse 84   Ht 1.657 m (5' 5.25\")   Wt 60.6 kg (133 lb 9.6 oz)   SpO2 98%   BMI 22.06 kg/m   Estimated body mass index is 22.06 kg/m  as calculated from the following:    Height as of this encounter: 1.657 m (5' 5.25\").    Weight as of this encounter: 60.6 kg (133 lb 9.6 oz).  EXAM:   GENERAL APPEARANCE: healthy, alert and no distress  EYES: Eyes grossly normal to inspection, PERRL and conjunctivae and sclerae normal  HENT: ear canals and TM's normal, nose and mouth without ulcers or lesions, oropharynx clear and oral mucous membranes moist  NECK: no adenopathy, no asymmetry, masses, or scars and thyroid normal to palpation  RESP: lungs clear to auscultation - no rales, rhonchi or wheezes  BREAST: normal without masses, tenderness or nipple discharge and no palpable axillary masses or adenopathy  CV: regular rate and rhythm, normal S1 S2, no S3 or S4, no murmur, click or rub, no peripheral edema and peripheral pulses strong  ABDOMEN: soft, nontender, no hepatosplenomegaly, no masses and bowel sounds normal  MS: no musculoskeletal defects are noted and gait is age appropriate " without ataxia  SKIN: no suspicious lesions or rashes  NEURO: Normal strength and tone, sensory exam grossly normal, mentation intact and speech normal  PSYCH: mentation appears normal and affect normal/bright    Diagnostic Test Results:  Labs reviewed in Epic  No results found for this or any previous visit (from the past 24 hour(s)).    ASSESSMENT / PLAN:   1. Encounter for Medicare annual wellness exam    Generally the patient is doing very well.  We discussed healthy lifestyles, including adequate exercise (3-4 times a week for 20-30 minutes), and a healthy diet.  Patient should return for annual physicals, and we can also see them here as needed.       2. Menopausal and postmenopausal disorder    We will take her down 1 more notch to the 0.025 mg patch to do weekly.  We talked about possibly even to stopping it completely at this point, but she prefers to go down to this last step in the next year talk about going off of it completely if it is going well.      - estradiol (FEMPATCH) 0.025 MG/24HR weekly patch; Place 1 patch onto the skin once a week  Dispense: 12 patch; Refill: 3    3. Mixed hyperlipidemia    We will check a fasting lipid profile today and follow-up with her in the results when the become available.      - Lipid panel reflex to direct LDL Fasting; Future  - Lipid panel reflex to direct LDL Fasting    4. Essential hypertension, benign    Her blood pressure is quite good now and is not on any medications for it and so we can continue to monitor that going forward.      - Comprehensive metabolic panel; Future  - Comprehensive metabolic panel    5. Screening for thyroid disorder    - TSH; Future  - TSH    Patient has been advised of split billing requirements and indicates understanding: Yes    COUNSELING:  Reviewed preventive health counseling, as reflected in patient instructions       Regular exercise       Healthy diet/nutrition       Osteoporosis prevention/bone health       Colon cancer  "screening    Estimated body mass index is 22.06 kg/m  as calculated from the following:    Height as of this encounter: 1.657 m (5' 5.25\").    Weight as of this encounter: 60.6 kg (133 lb 9.6 oz).        Maggy Malik reports that Maggy Malik has never smoked. Maggy Malik has never used smokeless tobacco.    Appropriate preventive services were discussed with this patient, including applicable screening as appropriate for cardiovascular disease, diabetes, osteopenia/osteoporosis, and glaucoma.  As appropriate for age/gender, discussed screening for colorectal cancer, prostate cancer, breast cancer, and cervical cancer. Checklist reviewing preventive services available has been given to the patient.        Counseling Resources:  ATP IV Guidelines  Pooled Cohorts Equation Calculator  Breast Cancer Risk Calculator  BRCA-Related Cancer Risk Assessment: FHS-7 Tool  FRAX Risk Assessment  ICSI Preventive Guidelines  Dietary Guidelines for Americans, 2010  USDA's MyPlate  ASA Prophylaxis  Lung CA Screening    Lee Donovan MD  [facility]  "

## 2021-07-19 NOTE — PATIENT INSTRUCTIONS
Patient Education   Personalized Prevention Plan  You are due for the preventive services outlined below.  Your care team is available to assist you in scheduling these services.  If you have already completed any of these items, please share that information with your care team to update in your medical record.  Health Maintenance Due   Topic Date Due     ANNUAL REVIEW OF HM ORDERS  Never done     Zoster (Shingles) Vaccine (2 of 3) 10/25/2013     FALL RISK ASSESSMENT  07/16/2021

## 2021-07-21 ENCOUNTER — RECORDS - HEALTHEAST (OUTPATIENT)
Dept: ADMINISTRATIVE | Facility: CLINIC | Age: 66
End: 2021-07-21

## 2021-09-25 ENCOUNTER — HEALTH MAINTENANCE LETTER (OUTPATIENT)
Age: 66
End: 2021-09-25

## 2021-10-20 ENCOUNTER — HOSPITAL ENCOUNTER (OUTPATIENT)
Dept: MAMMOGRAPHY | Facility: CLINIC | Age: 66
Discharge: HOME | End: 2021-10-20
Attending: FAMILY MEDICINE | Admitting: FAMILY MEDICINE
Payer: COMMERCIAL

## 2021-10-20 DIAGNOSIS — Z12.31 SCREENING MAMMOGRAM, ENCOUNTER FOR: ICD-10-CM

## 2021-10-20 PROCEDURE — 77063 BREAST TOMOSYNTHESIS BI: CPT

## 2021-10-29 ENCOUNTER — ANCILLARY PROCEDURE (OUTPATIENT)
Dept: MAMMOGRAPHY | Facility: CLINIC | Age: 66
End: 2021-10-29
Attending: FAMILY MEDICINE
Payer: COMMERCIAL

## 2021-10-29 DIAGNOSIS — N64.89 BREAST ASYMMETRY: ICD-10-CM

## 2021-10-29 PROCEDURE — 77061 BREAST TOMOSYNTHESIS UNI: CPT | Mod: LT

## 2021-10-29 PROCEDURE — 76642 ULTRASOUND BREAST LIMITED: CPT | Mod: LT

## 2022-01-11 ENCOUNTER — TRANSFERRED RECORDS (OUTPATIENT)
Dept: HEALTH INFORMATION MANAGEMENT | Facility: CLINIC | Age: 67
End: 2022-01-11
Payer: COMMERCIAL

## 2022-03-29 ENCOUNTER — OFFICE VISIT (OUTPATIENT)
Dept: FAMILY MEDICINE | Facility: CLINIC | Age: 67
End: 2022-03-29
Payer: COMMERCIAL

## 2022-03-29 VITALS
HEART RATE: 67 BPM | SYSTOLIC BLOOD PRESSURE: 132 MMHG | BODY MASS INDEX: 22.34 KG/M2 | OXYGEN SATURATION: 99 % | WEIGHT: 134.06 LBS | HEIGHT: 65 IN | DIASTOLIC BLOOD PRESSURE: 72 MMHG

## 2022-03-29 DIAGNOSIS — T78.40XD ALLERGY, SUBSEQUENT ENCOUNTER: Primary | ICD-10-CM

## 2022-03-29 PROCEDURE — 99213 OFFICE O/P EST LOW 20 MIN: CPT | Performed by: FAMILY MEDICINE

## 2022-03-29 RX ORDER — GLUTAMINE 100 %
POWDER (GRAM) ORAL
COMMUNITY
End: 2022-11-16

## 2022-03-29 NOTE — PROGRESS NOTES
Assessment & Plan     Allergy, subsequent encounter    I think she would be best served by seeing my esteemed colleague Dr. Guerrero over in allergy.  A lot of her symptoms she initially described as stomach related, but she really does not have any stomach symptoms from these apparent food intolerances or allergies, and just gets what I would consider to be a histamine reaction with typical upper respiratory type symptoms with nasal congestion and drainage and sneezing and swelling in her eyes etc.  We can see about doing some actual skin testing or what ever Dr. Guerrero thinks would be necessary to try to figure out what is going on with her food intolerances/allergies.    - Adult Allergy/Asthma Referral; Future    Review of external notes as documented elsewhere in note             No follow-ups on file.    Lee Donovan MD  [facility]    Alize Winter is a 66 year old who presents for the following health issues     Gastric Problem    History of Present Illness       Reason for visit:  Gut health and food sensitivities  Symptom onset:  More than a month  Symptoms include:  Red, swollen eyes and nasal congestion.  some stomach pain  Symptom intensity:  Moderate  Symptom progression:  Worsening  Had these symptoms before:  No  What makes it worse:  Certain foods  What makes it better:  Low histamine foods and HistaResist before meals    She eats 4 or more servings of fruits and vegetables daily.She consumes 0 sweetened beverage(s) daily.She exercises with enough effort to increase her heart rate 30 to 60 minutes per day.  She exercises with enough effort to increase her heart rate 5 days per week.   She is taking medications regularly.             Review of Systems   Constitutional, HEENT, cardiovascular, pulmonary, gi and gu systems are negative, except as otherwise noted.      Objective    /72 (BP Location: Left arm, Patient Position: Sitting, Cuff Size: Adult Regular)   Pulse  "67   Ht 1.657 m (5' 5.25\")   Wt 60.8 kg (134 lb 1 oz)   SpO2 99%   BMI 22.14 kg/m    Body mass index is 22.14 kg/m .  Physical Exam   GENERAL: healthy, alert and no distress  NECK: no adenopathy, no asymmetry, masses, or scars and thyroid normal to palpation  RESP: lungs clear to auscultation - no rales, rhonchi or wheezes  CV: regular rate and rhythm, normal S1 S2, no S3 or S4, no murmur, click or rub, no peripheral edema and peripheral pulses strong  ABDOMEN: soft, nontender, no hepatosplenomegaly, no masses and bowel sounds normal  MS: no gross musculoskeletal defects noted, no edema                "

## 2022-04-04 ENCOUNTER — OFFICE VISIT (OUTPATIENT)
Dept: ALLERGY | Facility: CLINIC | Age: 67
End: 2022-04-04
Attending: FAMILY MEDICINE
Payer: COMMERCIAL

## 2022-04-04 VITALS — BODY MASS INDEX: 21.21 KG/M2 | OXYGEN SATURATION: 100 % | WEIGHT: 132 LBS | HEART RATE: 74 BPM | HEIGHT: 66 IN

## 2022-04-04 DIAGNOSIS — K90.49 FOOD INTOLERANCE: Primary | ICD-10-CM

## 2022-04-04 DIAGNOSIS — T78.40XD ALLERGY, SUBSEQUENT ENCOUNTER: ICD-10-CM

## 2022-04-04 DIAGNOSIS — J30.1 SEASONAL ALLERGIC RHINITIS DUE TO POLLEN: ICD-10-CM

## 2022-04-04 PROCEDURE — 95004 PERQ TESTS W/ALRGNC XTRCS: CPT | Performed by: ALLERGY & IMMUNOLOGY

## 2022-04-04 PROCEDURE — 99203 OFFICE O/P NEW LOW 30 MIN: CPT | Mod: 25 | Performed by: ALLERGY & IMMUNOLOGY

## 2022-04-04 RX ORDER — AZELASTINE 1 MG/ML
2 SPRAY, METERED NASAL 2 TIMES DAILY
Qty: 30 ML | Refills: 3 | Status: SHIPPED | OUTPATIENT
Start: 2022-04-04 | End: 2023-12-01

## 2022-04-04 NOTE — PROGRESS NOTES
"      Subjective       HPI     Chief complaint: Reaction to food    History of present illness: This is a pleasant 66-year-old woman I was asked to see for evaluation by Dr. Donovan in regards to reactions to food.  She states that she was diagnosed with a sensitive stomach years ago when she noted that this last fall, she had increased allergic rhinitis symptoms and had increase stomach pain.  She thought maybe they were related.  She then started have reactions to foods where she would eat certain foods and she would have tightness of her eyes and swelling of the eyes, nasal congestion feel that her head was plugged.  She had not been able to correlate it to specific foods.  She is not sure if it was warm foods.  She was seen by dietitian who ran a food sensitivity test.  She is trying elimination diets.  No hives, swelling or shortness of breath after eating foods.  She does not use any regular allergy medication but uses a half of Zyrtec in the fall when her symptoms are worse.  She denies any itchy mouth and throat with respiratory vegetables but she states she is not noticed whether or not symptoms occur with pressure to her vegetables.  She has tried some over-the-counter histamine supplement and she felt that this did help.  She has no joint pain after eating tomatoes.    Past medical history: Migraine    Social history: She is retired, no pets, non-smoker, lives at home with central air and a basement    Family history: Mother had a history of asthma and allergies, brother with allergies    Review of Systems   Constitutional, HEENT, cardiovascular, pulmonary, gi and gu systems are negative, except as otherwise noted.      Objective    Pulse 74   Ht 1.664 m (5' 5.5\")   Wt 59.9 kg (132 lb)   SpO2 100%   BMI 21.63 kg/m    Body mass index is 21.63 kg/m .  Physical Exam        Gen: Pleasant female not in acute distress  HEENT: Eyes no erythema of the bulbar or palpebral conjunctiva, no edema. Ears: No " deformities or lesions. Nose: No congestion,  Mouth: Throat clear,   Neck: No masses lesions or swelling  Respiratory: No coughing with breathing, no retractions  Lymph: No visible supraclavicular or cervical lymphadenopathy  Skin: No rashes or lesions  Psych: Alert and appropriate for age    7 percutaneous tests were undertaken to pollens implicated in oral allergy syndrome.  Positive histamine control with positive test to ragweed and mugwort pollen.  Please see scanned photograph.    Impression report and plan:  1.  Allergic rhinitis  2.  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  I did have her take note of the food she is eating when she has these reactions.  She has been taking an over-the-counter supplements that seems to help.  Okay to continue with this.  However, I would recommend Astelin nasal spray to be used as needed when she has symptoms.  Astelin would also help with respiratory rhinitis.  Symptoms are not consistent with an IgE mediated food allergy.  Follow-up as needed.

## 2022-04-04 NOTE — LETTER
4/4/2022         RE: Suzanne Malik  [address] [place] [address]        Dear Colleague,    Thank you for referring your patient, Suzanne Malik, to the [facility]. Please see a copy of my visit note below.          Subjective       HPI     Chief complaint: Reaction to food    History of present illness: This is a pleasant 66-year-old woman I was asked to see for evaluation by Dr. Donovan in regards to reactions to food.  She states that she was diagnosed with a sensitive stomach years ago when she noted that this last fall, she had increased allergic rhinitis symptoms and had increase stomach pain.  She thought maybe they were related.  She then started have reactions to foods where she would eat certain foods and she would have tightness of her eyes and swelling of the eyes, nasal congestion feel that her head was plugged.  She had not been able to correlate it to specific foods.  She is not sure if it was warm foods.  She was seen by dietitian who ran a food sensitivity test.  She is trying elimination diets.  No hives, swelling or shortness of breath after eating foods.  She does not use any regular allergy medication but uses a half of Zyrtec in the fall when her symptoms are worse.  She denies any itchy mouth and throat with respiratory vegetables but she states she is not noticed whether or not symptoms occur with pressure to her vegetables.  She has tried some over-the-counter histamine supplement and she felt that this did help.  She has no joint pain after eating tomatoes.    Past medical history: Migraine    Social history: She is retired, no pets, non-smoker, lives at home with central air and a basement    Family history: Mother had a history of asthma and allergies, brother with allergies    Review of Systems   Constitutional, HEENT, cardiovascular, pulmonary, gi and gu systems are negative, except as otherwise noted.      Objective    Pulse 74   Ht  "1.664 m (5' 5.5\")   Wt 59.9 kg (132 lb)   SpO2 100%   BMI 21.63 kg/m    Body mass index is 21.63 kg/m .  Physical Exam        Gen: Pleasant female not in acute distress  HEENT: Eyes no erythema of the bulbar or palpebral conjunctiva, no edema. Ears: No deformities or lesions. Nose: No congestion,  Mouth: Throat clear,   Neck: No masses lesions or swelling  Respiratory: No coughing with breathing, no retractions  Lymph: No visible supraclavicular or cervical lymphadenopathy  Skin: No rashes or lesions  Psych: Alert and appropriate for age    7 percutaneous tests were undertaken to pollens implicated in oral allergy syndrome.  Positive histamine control with positive test to ragweed and mugwort pollen.  Please see scanned photograph.    Impression report and plan:  1.  Allergic rhinitis  2.  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  I did have her take note of the food she is eating when she has these reactions.  She has been taking an over-the-counter supplements that seems to help.  Okay to continue with this.  However, I would recommend Astelin nasal spray to be used as needed when she has symptoms.  Astelin would also help with respiratory rhinitis.  Symptoms are not consistent with an IgE mediated food allergy.  Follow-up as needed.          Again, thank you for allowing me to participate in the care of your patient.        Sincerely,        Kelly HARRISON MD    "

## 2022-04-04 NOTE — PATIENT INSTRUCTIONS
Oral Allergy Syndrome versus gustatory rhinitis    Watch foods that in ragweed/mugwort families    Astelin 2 sprays each nostril twice daily as needed

## 2022-07-23 DIAGNOSIS — N95.9 MENOPAUSAL AND POSTMENOPAUSAL DISORDER: ICD-10-CM

## 2022-07-23 RX ORDER — ESTRADIOL 0.03 MG/D
PATCH TRANSDERMAL
Qty: 12 PATCH | Refills: 2 | Status: SHIPPED | OUTPATIENT
Start: 2022-07-23 | End: 2022-11-16

## 2022-07-23 NOTE — TELEPHONE ENCOUNTER
"Last Written Prescription Date:  7/19/21  Last Fill Quantity: 12,  # refills: 3   Last office visit provider:  3/29/22     Requested Prescriptions   Pending Prescriptions Disp Refills     estradiol (FEMPATCH) 0.025 MG/24HR weekly patch [Pharmacy Med Name: ESTRADIOL 0.025 MG PATCH(1/WK)] 12 patch 3     Sig: APPLY 1 PATCH ONCE A WEEK       Hormone Replacement Therapy Passed - 7/23/2022 12:18 AM        Passed - Blood pressure under 140/90 in past 12 months     BP Readings from Last 3 Encounters:   03/29/22 132/72   07/19/21 130/78   10/13/20 112/84                 Passed - Recent (12 mo) or future (30 days) visit within the authorizing provider's specialty     Patient has had an office visit with the authorizing provider or a provider within the authorizing providers department within the previous 12 mos or has a future within next 30 days. See \"Patient Info\" tab in inbasket, or \"Choose Columns\" in Meds & Orders section of the refill encounter.              Passed - Patient has mammogram in past 2 years on file if age 50-75        Passed - Medication is active on med list        Passed - Patient is 18 years of age or older        Passed - No active pregnancy on record        Passed - No positive pregnancy test on record in past 12 months             Jessika Hough 07/23/22 3:10 PM  "

## 2022-08-27 ENCOUNTER — HEALTH MAINTENANCE LETTER (OUTPATIENT)
Age: 67
End: 2022-08-27

## 2022-11-16 ENCOUNTER — OFFICE VISIT (OUTPATIENT)
Dept: FAMILY MEDICINE | Facility: CLINIC | Age: 67
End: 2022-11-16
Payer: COMMERCIAL

## 2022-11-16 VITALS
SYSTOLIC BLOOD PRESSURE: 122 MMHG | DIASTOLIC BLOOD PRESSURE: 84 MMHG | HEIGHT: 66 IN | WEIGHT: 134 LBS | BODY MASS INDEX: 21.53 KG/M2 | OXYGEN SATURATION: 96 % | TEMPERATURE: 98 F | HEART RATE: 76 BPM

## 2022-11-16 DIAGNOSIS — N95.9 MENOPAUSAL AND POSTMENOPAUSAL DISORDER: ICD-10-CM

## 2022-11-16 DIAGNOSIS — Z12.31 ENCOUNTER FOR SCREENING MAMMOGRAM FOR BREAST CANCER: ICD-10-CM

## 2022-11-16 DIAGNOSIS — Z76.89 ENCOUNTER TO ESTABLISH CARE: Primary | ICD-10-CM

## 2022-11-16 DIAGNOSIS — Z78.0 POST-MENOPAUSAL: ICD-10-CM

## 2022-11-16 DIAGNOSIS — M85.80 OSTEOPENIA, UNSPECIFIED LOCATION: ICD-10-CM

## 2022-11-16 DIAGNOSIS — Z00.00 ENCOUNTER FOR MEDICARE ANNUAL WELLNESS EXAM: ICD-10-CM

## 2022-11-16 DIAGNOSIS — E78.2 MIXED HYPERLIPIDEMIA: ICD-10-CM

## 2022-11-16 PROBLEM — M89.9 DISORDER OF BONE AND CARTILAGE: Status: ACTIVE | Noted: 2022-11-16

## 2022-11-16 PROBLEM — M94.9 DISORDER OF BONE AND CARTILAGE: Status: ACTIVE | Noted: 2022-11-16

## 2022-11-16 LAB
CHOLEST SERPL-MCNC: 215 MG/DL
FASTING STATUS PATIENT QL REPORTED: YES
GLUCOSE SERPL-MCNC: 79 MG/DL (ref 70–99)
HDLC SERPL-MCNC: 72 MG/DL
HGB BLD-MCNC: 14.3 G/DL (ref 11.7–15.7)
LDLC SERPL CALC-MCNC: 127 MG/DL
NONHDLC SERPL-MCNC: 143 MG/DL
TRIGL SERPL-MCNC: 79 MG/DL

## 2022-11-16 PROCEDURE — 80061 LIPID PANEL: CPT | Performed by: FAMILY MEDICINE

## 2022-11-16 PROCEDURE — 99213 OFFICE O/P EST LOW 20 MIN: CPT | Mod: 25 | Performed by: FAMILY MEDICINE

## 2022-11-16 PROCEDURE — 82947 ASSAY GLUCOSE BLOOD QUANT: CPT | Performed by: FAMILY MEDICINE

## 2022-11-16 PROCEDURE — 85018 HEMOGLOBIN: CPT | Performed by: FAMILY MEDICINE

## 2022-11-16 PROCEDURE — 36415 COLL VENOUS BLD VENIPUNCTURE: CPT | Performed by: FAMILY MEDICINE

## 2022-11-16 PROCEDURE — G0439 PPPS, SUBSEQ VISIT: HCPCS | Performed by: FAMILY MEDICINE

## 2022-11-16 ASSESSMENT — ACTIVITIES OF DAILY LIVING (ADL): CURRENT_FUNCTION: NO ASSISTANCE NEEDED

## 2022-11-16 ASSESSMENT — ENCOUNTER SYMPTOMS
HEMATOCHEZIA: 0
HEADACHES: 0
PARESTHESIAS: 0
MYALGIAS: 0
DIZZINESS: 0
EYE PAIN: 0
HEARTBURN: 0
COUGH: 0
CONSTIPATION: 0
NAUSEA: 0
NERVOUS/ANXIOUS: 0
HEMATURIA: 0
FEVER: 0
DYSURIA: 0
WEAKNESS: 0
ABDOMINAL PAIN: 0
SORE THROAT: 0
SHORTNESS OF BREATH: 0
JOINT SWELLING: 0
BREAST MASS: 0
PALPITATIONS: 0
FREQUENCY: 0
DIARRHEA: 0
CHILLS: 0
ARTHRALGIAS: 0

## 2022-11-16 NOTE — PROGRESS NOTES
"SUBJECTIVE:   Maggy is a 67 year old who presents for Preventive Visit.  Patient has been advised of split billing requirements and indicates understanding: Yes  Are you in the first 12 months of your Medicare coverage?  No    Healthy Habits:     In general, how would you rate your overall health?  Excellent    Frequency of exercise:  4-5 days/week    Duration of exercise:  15-30 minutes    Do you usually eat at least 4 servings of fruit and vegetables a day, include whole grains    & fiber and avoid regularly eating high fat or \"junk\" foods?  Yes    Taking medications regularly:  Yes    Medication side effects:  None    Ability to successfully perform activities of daily living:  No assistance needed    Home Safety:  No safety concerns identified    Hearing Impairment:  No hearing concerns    In the past 6 months, have you been bothered by leaking of urine?  No    In general, how would you rate your overall mental or emotional health?  Excellent      PHQ-2 Total Score: 0    Additional concerns today:  No    Chief Complaint   Patient presents with     Wellness Visit     Discuss shingrix, flu and covid shot when to get them, and discuss hormone replacement wants to get off it. Dexa scan. Pt is fasting     Pt is new to me today; pt of Dr. Santamaria.     Hx HTN, not requiring meds at this time (ever since group home)    Has been slowly weaning down her her estradiol patch; currently using 0.025mg weekly patch.  She had a total hysterectomy with oophorectomy (by pt report) age 42; 25yr ago. That's when she started the hormone replacement for bone density.  Was previously on a 1.0 mg estradiol patch until about 3-4 years ago that they started weaning down the dose  Sleep/brain fog were her main issues; never had very many hot flashes    Hx of dietary issues in the fall; past that are resolved now. Head would get congested when she ate certain foods; saw Dr. Guerrero. +for ragweed and food oral allergy syndrome (tomatoes, green " peppers, banana in that category). Used nasal steroid spray.      Reviewed cholesterol numbers; her mom has hx of not tolerating statins. Both parents have high cholesterol.    For Maggy: she has talked to a cardiologist in the past; only risk factor is family hx at this point.    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  She lives alone and does not have children and enjoys outdoor activities.       Have you ever done Advance Care Planning? (For example, a Health Directive, POLST, or a discussion with a medical provider or your loved ones about your wishes): Yes, patient states has an Advance Care Planning document and will bring a copy to the clinic.       Fall risk  Fallen 2 or more times in the past year?: No  Any fall with injury in the past year?: No    Cognitive Screening   1) Repeat 3 items (Leader, Season, Table)    2) Clock draw: NORMAL  3) 3 item recall: Recalls 3 objects  Results: 3 items recalled: COGNITIVE IMPAIRMENT LESS LIKELY    Mini-CogTM Copyright DINESH Frost. Licensed by the author for use in [facility]; reprinted with permission (joão@.[facility]). All rights reserved.          Reviewed and updated as needed this visit by clinical staff   Tobacco  Allergies  Meds  Problems  Med Hx  Surg Hx  Fam Hx          Reviewed and updated as needed this visit by Provider    Tobacco  Allergies  Meds  Problems  Med Hx  Surg Hx  Fam Hx         Social History     Tobacco Use     Smoking status: Never     Smokeless tobacco: Never   Substance Use Topics     Alcohol use: Not on file     Comment: 1-2/week         Alcohol Use 11/16/2022   Prescreen: >3 drinks/day or >7 drinks/week? No   Prescreen: >3 drinks/day or >7 drinks/week? -         Current providers sharing in care for this patient include:   Patient Care Team:  Lee Donovan MD as Assigned PCP  Kelly Guerrero MD as Assigned Allergy Provider    The following health maintenance items are reviewed in Epic and correct as of today:  Health Maintenance   Topic Date Due     ANNUAL REVIEW OF HM ORDERS  Never done     ZOSTER IMMUNIZATION (2 of 3) 10/25/2013     COVID-19 Vaccine (3 - Booster for Moderna series) 05/12/2021     INFLUENZA VACCINE (1) 09/01/2022     MAMMO SCREENING  10/29/2023     MEDICARE ANNUAL WELLNESS VISIT  11/16/2023     FALL RISK ASSESSMENT  11/16/2023     LIPID  07/19/2026     COLORECTAL CANCER SCREENING  12/08/2026     ADVANCE CARE PLANNING  11/16/2027     DTAP/TDAP/TD IMMUNIZATION (4 - Td or Tdap) 11/13/2030     DEXA  07/25/2033     HEPATITIS C SCREENING  Completed     PHQ-2 (once per calendar year)  Completed     Pneumococcal Vaccine: 65+ Years  Completed     IPV IMMUNIZATION  Aged Out     MENINGITIS IMMUNIZATION  Aged Out           Breast CA Risk Assessment (FHS-7) 11/16/2022   Do you have a family history of breast, colon, or ovarian cancer? No / Unknown     Pertinent mammograms are reviewed under the imaging tab.    Review of Systems   Constitutional: Negative for chills and fever.   HENT: Negative for congestion, ear pain, hearing loss and sore throat.    Eyes: Negative for pain and visual disturbance.   Respiratory: Negative for cough and shortness of breath.    Cardiovascular: Negative for chest pain, palpitations and peripheral edema.   Gastrointestinal: Negative for abdominal pain, constipation, diarrhea,  "heartburn, hematochezia and nausea.   Breasts:  Negative for tenderness, breast mass and discharge.   Genitourinary: Negative for dysuria, frequency, genital sores, hematuria, pelvic pain, urgency, vaginal bleeding and vaginal discharge.   Musculoskeletal: Negative for arthralgias, joint swelling and myalgias.   Skin: Negative for rash.   Neurological: Negative for dizziness, weakness, headaches and paresthesias.   Psychiatric/Behavioral: Negative for mood changes. The patient is not nervous/anxious.          OBJECTIVE:   /84 (BP Location: Left arm, Patient Position: Sitting, Cuff Size: Adult Regular)   Pulse 76   Temp 98  F (36.7  C) (Oral)   Ht 1.664 m (5' 5.5\")   Wt 60.8 kg (134 lb)   SpO2 96%   BMI 21.96 kg/m   Estimated body mass index is 21.96 kg/m  as calculated from the following:    Height as of this encounter: 1.664 m (5' 5.5\").    Weight as of this encounter: 60.8 kg (134 lb).  Physical Exam  GENERAL: healthy, alert and no distress  EYES: Eyes grossly normal to inspection, PERRL and conjunctivae and sclerae normal  HENT: ear canals and TM's normal, nose and mouth without ulcers or lesions  NECK: no adenopathy, no asymmetry, masses, or scars and thyroid normal to palpation  RESP: lungs clear to auscultation - no rales, rhonchi or wheezes  CV: regular rate and rhythm, normal S1 S2, no S3 or S4, no murmur, click or rub, no peripheral edema and peripheral pulses strong  ABDOMEN: soft, nontender, no hepatosplenomegaly, no masses and bowel sounds normal  MS: no gross musculoskeletal defects noted, no edema    Diagnostic Test Results:  Labs reviewed in Epic    ASSESSMENT / PLAN:   Suzanne was seen today for wellness visit.    Diagnoses and all orders for this visit:    Encounter to establish care  Medical hx reviewed and updated. She understands my panel is full but able to accept pt if she chooses with the understanding for appt availability.     Encounter for Medicare annual wellness exam  - Home " safety information was reviewed if pertinent for falls prevention: regular checkups with vision and hearing, mindful medication use elva with OTCs, using any assisted walking devices, adequate shoes and feet wear, avoiding loose rugs, safety additions to the home if needed, keeping the body in good shape with regular exercise especially walking, and limiting alcohol intake.  - Social history was reviewed  - Patient is independent with activities of daily living  - We reviewed active symptoms and discussed management  - We reviewed list of healthcare providers for this patient.  - We also reviewed PHQ 9      -     Hemoglobin; Future  -     Glucose; Future  -     Lipid panel reflex to direct LDL Fasting; Future    Post-menopausal  Osteopenia, unspecified location  Reviewed how to wean off her estradiol patch given risks outweigh benefits generally at this point    -     DX Hip/Pelvis/Spine; Future    Encounter for screening mammogram for breast cancer  -     MA Screen Bilateral w/Darwin; Future    Mixed hyperlipidemia  Reviewed last ASCVD risk of 7.8% and without other risk factors and age nearly 68 ok to continue to monitor      Patient has been advised of split billing requirements and indicates understanding: Yes      COUNSELING:  Reviewed preventive health counseling, as reflected in patient instructions        She reports that she has never smoked. She has never used smokeless tobacco.      Appropriate preventive services were discussed with this patient, including applicable screening as appropriate for cardiovascular disease, diabetes, osteopenia/osteoporosis, and glaucoma.  As appropriate for age/gender, discussed screening for colorectal cancer, prostate cancer, breast cancer, and cervical cancer. Checklist reviewing preventive services available has been given to the patient.    Reviewed patients plan of care and provided an AVS. The Basic Care Plan (routine screening as documented in Health Maintenance) for  Suzanne meets the Care Plan requirement. This Care Plan has been established and reviewed with the Patient.      Ciara Moe MD  [facility]    Identified Health Risks:

## 2022-11-16 NOTE — PATIENT INSTRUCTIONS
There is a new shingles vaccine that is 97% effective. It is the Shingrix vaccine and is recommended for those 50 and older. We recommend the vaccine even if you have had shingles or the older vaccine against shingles- Zostavax. Insurance coverage for the vaccine varies. I recommend you check with your insurance to verify if, when and where it is covered. The vaccine is covered by most commercial insurance but Medicare does not cover the vaccine. It may be covered by Medicare Part D (your drug/pharmacy plan) and sometimes it is covered only at a pharmacy instead of here in the clinic. If it is covered in the clinic, you may schedule a nurse visit anytime to get the first dose of the vaccine. The second dose is recommended two months after the first and should be gotten by 6 months after the first.   About 10% of people will get a sore, red reaction at the site of the injection. Some people may also feel a little sick or nauseated after the injection. This may happen with either the first and/or second vaccine.      To wean off the estradiol patch: try cutting in half for a few weeks and then stop; if hot flashes or feeling unwell, we can go slower

## 2022-12-01 ENCOUNTER — HOSPITAL ENCOUNTER (OUTPATIENT)
Dept: MAMMOGRAPHY | Facility: CLINIC | Age: 67
Discharge: HOME | End: 2022-12-01
Attending: FAMILY MEDICINE | Admitting: FAMILY MEDICINE
Payer: COMMERCIAL

## 2022-12-01 DIAGNOSIS — Z12.31 ENCOUNTER FOR SCREENING MAMMOGRAM FOR BREAST CANCER: ICD-10-CM

## 2022-12-01 PROCEDURE — 77067 SCR MAMMO BI INCL CAD: CPT

## 2022-12-12 ENCOUNTER — ANCILLARY PROCEDURE (OUTPATIENT)
Dept: BONE DENSITY | Facility: CLINIC | Age: 67
End: 2022-12-12
Attending: FAMILY MEDICINE
Payer: COMMERCIAL

## 2022-12-12 DIAGNOSIS — M85.80 OSTEOPENIA, UNSPECIFIED LOCATION: ICD-10-CM

## 2022-12-12 DIAGNOSIS — Z78.0 POST-MENOPAUSAL: ICD-10-CM

## 2022-12-12 PROCEDURE — 77080 DXA BONE DENSITY AXIAL: CPT | Mod: TC | Performed by: RADIOLOGY

## 2023-02-21 ENCOUNTER — MYC MEDICAL ADVICE (OUTPATIENT)
Dept: FAMILY MEDICINE | Facility: CLINIC | Age: 68
End: 2023-02-21
Payer: COMMERCIAL

## 2023-02-21 DIAGNOSIS — N95.9 MENOPAUSAL AND POSTMENOPAUSAL DISORDER: ICD-10-CM

## 2023-02-21 RX ORDER — ESTRADIOL 0.03 MG/D
PATCH TRANSDERMAL
Qty: 12 PATCH | Refills: 2 | Status: SHIPPED | OUTPATIENT
Start: 2023-02-21 | End: 2023-07-18

## 2023-03-02 ENCOUNTER — TRANSFERRED RECORDS (OUTPATIENT)
Dept: HEALTH INFORMATION MANAGEMENT | Facility: CLINIC | Age: 68
End: 2023-03-02

## 2023-06-06 ENCOUNTER — MYC MEDICAL ADVICE (OUTPATIENT)
Dept: FAMILY MEDICINE | Facility: CLINIC | Age: 68
End: 2023-06-06
Payer: COMMERCIAL

## 2023-06-07 NOTE — TELEPHONE ENCOUNTER
See MyChart from Patient needing PCP review.  Please respond directly to patient, if at all able.    LOV 11/16/2022        TYE Hull  [facility]

## 2023-06-09 ENCOUNTER — HOSPITAL ENCOUNTER (EMERGENCY)
Facility: CLINIC | Age: 68
Discharge: HOME | End: 2023-06-09
Attending: EMERGENCY MEDICINE | Admitting: EMERGENCY MEDICINE
Payer: COMMERCIAL

## 2023-06-09 ENCOUNTER — MYC MEDICAL ADVICE (OUTPATIENT)
Dept: FAMILY MEDICINE | Facility: CLINIC | Age: 68
End: 2023-06-09

## 2023-06-09 VITALS
DIASTOLIC BLOOD PRESSURE: 79 MMHG | RESPIRATION RATE: 22 BRPM | TEMPERATURE: 97.6 F | BODY MASS INDEX: 22.29 KG/M2 | SYSTOLIC BLOOD PRESSURE: 134 MMHG | WEIGHT: 136 LBS | OXYGEN SATURATION: 98 % | HEART RATE: 65 BPM

## 2023-06-09 DIAGNOSIS — R42 LIGHTHEADEDNESS: ICD-10-CM

## 2023-06-09 LAB
ALBUMIN UR-MCNC: NEGATIVE MG/DL
ANION GAP SERPL CALCULATED.3IONS-SCNC: 8 MMOL/L (ref 5–18)
APPEARANCE UR: CLEAR
ATRIAL RATE - MUSE: 70 BPM
BACTERIA #/AREA URNS HPF: ABNORMAL /HPF
BASOPHILS # BLD AUTO: 0.1 10E3/UL (ref 0–0.2)
BASOPHILS NFR BLD AUTO: 1 %
BILIRUB UR QL STRIP: NEGATIVE
BUN SERPL-MCNC: 16 MG/DL (ref 8–28)
CALCIUM SERPL-MCNC: 9.2 MG/DL (ref 8.5–10.5)
CHLORIDE BLD-SCNC: 107 MMOL/L (ref 98–107)
CO2 SERPL-SCNC: 28 MMOL/L (ref 22–31)
COLOR UR AUTO: COLORLESS
CREAT SERPL-MCNC: 0.77 MG/DL (ref 0.6–1.1)
DIASTOLIC BLOOD PRESSURE - MUSE: NORMAL MMHG
EOSINOPHIL # BLD AUTO: 0.2 10E3/UL (ref 0–0.7)
EOSINOPHIL NFR BLD AUTO: 3 %
ERYTHROCYTE [DISTWIDTH] IN BLOOD BY AUTOMATED COUNT: 12.5 % (ref 10–15)
FLUAV RNA SPEC QL NAA+PROBE: NEGATIVE
FLUBV RNA RESP QL NAA+PROBE: NEGATIVE
GFR SERPL CREATININE-BSD FRML MDRD: 83 ML/MIN/1.73M2
GLUCOSE BLD-MCNC: 84 MG/DL (ref 70–125)
GLUCOSE UR STRIP-MCNC: NEGATIVE MG/DL
HCT VFR BLD AUTO: 43.2 % (ref 35–47)
HGB BLD-MCNC: 14 G/DL (ref 11.7–15.7)
HGB UR QL STRIP: NEGATIVE
IMM GRANULOCYTES # BLD: 0 10E3/UL
IMM GRANULOCYTES NFR BLD: 0 %
INTERPRETATION ECG - MUSE: NORMAL
KETONES UR STRIP-MCNC: NEGATIVE MG/DL
LEUKOCYTE ESTERASE UR QL STRIP: NEGATIVE
LYMPHOCYTES # BLD AUTO: 1.5 10E3/UL (ref 0.8–5.3)
LYMPHOCYTES NFR BLD AUTO: 33 %
MAGNESIUM SERPL-MCNC: 2.1 MG/DL (ref 1.8–2.6)
MCH RBC QN AUTO: 30.9 PG (ref 26.5–33)
MCHC RBC AUTO-ENTMCNC: 32.4 G/DL (ref 31.5–36.5)
MCV RBC AUTO: 95 FL (ref 78–100)
MONOCYTES # BLD AUTO: 0.6 10E3/UL (ref 0–1.3)
MONOCYTES NFR BLD AUTO: 12 %
NEUTROPHILS # BLD AUTO: 2.4 10E3/UL (ref 1.6–8.3)
NEUTROPHILS NFR BLD AUTO: 51 %
NITRATE UR QL: NEGATIVE
NRBC # BLD AUTO: 0 10E3/UL
NRBC BLD AUTO-RTO: 0 /100
P AXIS - MUSE: 69 DEGREES
PH UR STRIP: 6.5 [PH] (ref 5–7)
PLATELET # BLD AUTO: 298 10E3/UL (ref 150–450)
POTASSIUM BLD-SCNC: 4 MMOL/L (ref 3.5–5)
PR INTERVAL - MUSE: 184 MS
QRS DURATION - MUSE: 82 MS
QT - MUSE: 426 MS
QTC - MUSE: 460 MS
R AXIS - MUSE: -11 DEGREES
RBC # BLD AUTO: 4.53 10E6/UL (ref 3.8–5.2)
RBC URINE: <1 /HPF
RSV RNA SPEC NAA+PROBE: NEGATIVE
SARS-COV-2 RNA RESP QL NAA+PROBE: NEGATIVE
SODIUM SERPL-SCNC: 143 MMOL/L (ref 136–145)
SP GR UR STRIP: 1 (ref 1–1.03)
SYSTOLIC BLOOD PRESSURE - MUSE: NORMAL MMHG
T AXIS - MUSE: 30 DEGREES
TROPONIN I SERPL-MCNC: <0.01 NG/ML (ref 0–0.29)
TSH SERPL DL<=0.005 MIU/L-ACNC: 1.54 UIU/ML (ref 0.3–5)
UROBILINOGEN UR STRIP-MCNC: <2 MG/DL
VENTRICULAR RATE- MUSE: 70 BPM
WBC # BLD AUTO: 4.7 10E3/UL (ref 4–11)
WBC URINE: <1 /HPF

## 2023-06-09 PROCEDURE — 81001 URINALYSIS AUTO W/SCOPE: CPT | Performed by: EMERGENCY MEDICINE

## 2023-06-09 PROCEDURE — 83735 ASSAY OF MAGNESIUM: CPT | Performed by: EMERGENCY MEDICINE

## 2023-06-09 PROCEDURE — 96360 HYDRATION IV INFUSION INIT: CPT

## 2023-06-09 PROCEDURE — 87637 SARSCOV2&INF A&B&RSV AMP PRB: CPT | Performed by: EMERGENCY MEDICINE

## 2023-06-09 PROCEDURE — 36415 COLL VENOUS BLD VENIPUNCTURE: CPT | Performed by: EMERGENCY MEDICINE

## 2023-06-09 PROCEDURE — 93005 ELECTROCARDIOGRAM TRACING: CPT | Performed by: EMERGENCY MEDICINE

## 2023-06-09 PROCEDURE — 84443 ASSAY THYROID STIM HORMONE: CPT | Performed by: EMERGENCY MEDICINE

## 2023-06-09 PROCEDURE — 82310 ASSAY OF CALCIUM: CPT | Performed by: EMERGENCY MEDICINE

## 2023-06-09 PROCEDURE — 84484 ASSAY OF TROPONIN QUANT: CPT | Performed by: EMERGENCY MEDICINE

## 2023-06-09 PROCEDURE — 258N000003 HC RX IP 258 OP 636: Performed by: EMERGENCY MEDICINE

## 2023-06-09 PROCEDURE — 99284 EMERGENCY DEPT VISIT MOD MDM: CPT | Mod: 25

## 2023-06-09 PROCEDURE — 85025 COMPLETE CBC W/AUTO DIFF WBC: CPT | Performed by: EMERGENCY MEDICINE

## 2023-06-09 RX ADMIN — SODIUM CHLORIDE 1000 ML: 9 INJECTION, SOLUTION INTRAVENOUS at 08:11

## 2023-06-09 ASSESSMENT — ACTIVITIES OF DAILY LIVING (ADL): ADLS_ACUITY_SCORE: 35

## 2023-06-09 NOTE — DISCHARGE INSTRUCTIONS
As we discussed, the work-up in the ER is normal meaning there are no signs of anemia, infections, electrolyte problems, heart problems, thyroid problems, or severe dehydration.  With all of the pollen and smoke in the air, it is possible that this is from being congested.  I recommend trialing an allergy medicine like Zyrtec as well as a decongestant like Coricidin HBP to see if this helps.

## 2023-06-09 NOTE — ED PROVIDER NOTES
EMERGENCY DEPARTMENT ENCOUNTER     NAME: Suzanne Malik   AGE: 68 year old female   YOB: 1955   MRN: [id number]   EVALUATION DATE & TIME: No admission date for patient encounter.   PCP: Ciara Moe     Chief Complaint   Patient presents with     Dizziness   :    FINAL IMPRESSION       1. Lightheadedness           ED COURSE & MEDICAL DECISION MAKING      Pertinent Labs & Imaging studies reviewed. (See chart for details)   68 year old female  presents to the Emergency Department for evaluation of lightheadedness that she woke up with this morning.  She states she has been feeling feeling fatigued for couple of days and congested. Initial Vitals Reviewed. Initial exam notable for well-appearing patient who is neurologically intact.  Her triage blood pressure was elevated, but with no intervention when I saw her after she was calm her blood pressure was in the 130s over 70s.  I considered something like anemia, viral syndrome, dehydration, electrolyte abnormalities, allergic rhinitis causing congestion, thyroid dysfunction, arrhythmia, ACS, UTI.  Work-up in the ED including labs, COVID and flu testing, EKG, and urinalysis are all unremarkable.  I discussed the case with patient and trialed some IV fluids without significant change.  With the congestion, this may be a viral syndrome versus allergic rhinitis.  I am going to have her try some Coricidin HBP and some outpatient Zyrtec as a trial, and follow-up with her PCP for recheck.  She is comfortable with this plan and discharged in stable condition.        7:39 AM I met with the patient to gather history and to perform my initial exam. I discussed the plan for care while in the Emergency Department.  9:04 AM Checked in on and updated patient.       At the conclusion of the encounter I discussed the results of all of the tests and the disposition. The questions were answered. The patient or family acknowledged understanding and was agreeable  with the care plan.         Medical Decision Making    History:    Supplemental history from: N/A    External Record(s) reviewed: Documented in chart, if applicable., PCP mychart message today    Work Up:    Chart documentation includes differential considered and any EKGs or imaging interpreted by provider.    In additional to work up documented, I considered the following work up: Documented in chart, if applicable.    External consultation:    Discussion of management with another provider: Documented in chart, if applicable    Complicating factors:    Care impacted by chronic illness: HTN    Care affected by social determinants of health: access to care    Disposition considerations: Discharge. No recommendations on prescription strength medication(s). I considered admission, but ultimately discharged patient With reassuring work-up.      MEDICATIONS GIVEN IN THE EMERGENCY:   Medications - No data to display   NEW PRESCRIPTIONS STARTED AT TODAY'S ER VISIT   New Prescriptions    No medications on file     ================================================================   HISTORY OF PRESENT ILLNESS       Patient information was obtained from: Patient   Use of Intrepreter: N/A   Suzanne Malik is a 68 year old female with history of hypertension, hyperlipidemia, and basal cell cancer who presents for lightheadedness.      Patient reports waking up lightheaded this morning. She states she felt fine when she went to bed. Patient reports a history of vertigo, but states this feels different. Patient also endorses feeling more tired lately, and notes her O2 levels and blood pressure have been off. Patient states she is hydrated, and was still able to get to and from the bathroom. Patient endorses having a doctors appointment on 6/22. Patient denies cough, vomiting, diarrhea, nausea, chills, or any other complaints at this time.     ================================================================        PAST HISTORY      PAST MEDICAL HISTORY:   Past Medical History:   Diagnosis Date     Allergy, unspecified not elsewhere classified      Basal cell cancer 2014     Breast cyst 2008    Left breast     Breast cyst 2011    Right breast     Diffuse cystic mastopathy      Essential hypertension, benign      Intestinal disaccharidase deficiencies and disaccharide malabsorption      Migraine, unspecified, without mention of intractable migraine without mention of status migrainosus      Unspecified menopausal and postmenopausal disorder       PAST SURGICAL HISTORY:   Past Surgical History:   Procedure Laterality Date     BASAL CELL CARCINOMA EXCISION  2014    left shoulder     BREAST CYST ASPIRATION Left 2008     BREAST CYST ASPIRATION Right 2011     HC REMOVAL OF TONSILS,<13 Y/O      Description: Tonsillectomy;  Proc Date: 01/01/1957;     HC REMOVE TONSILS/ADENOIDS,<13 Y/O       HYSTERECTOMY       OOPHORECTOMY       [facility] APPENDECTOMY  8 years old     [facility] APPENDECTOMY      Description: Appendectomy;  Proc Date: 01/01/1963;     [facility] TOTAL ABDOM HYSTERECTOMY  1998    for adenomyosis     [facility] VAG HYST,RMV TUBE/OVARY  01/1998      CURRENT MEDICATIONS:   azelastine (ASTELIN) 0.1 % nasal spray  CALCIUM + D OR  estradiol (FEMPATCH) 0.025 MG/24HR weekly patch  PATADAY 0.2 % OP SOLN  Probiotic Product (PROBIOTIC PO)  VITAMIN B COMPLEX OR  VITAMIN C 500 MG OR TABS      ALLERGIES:   Allergies   Allergen Reactions     Ragweeds      Food oral allergy syndrome      FAMILY HISTORY:   Family History   Problem Relation Age of Onset     Hypertension Father      C.A.D. Father         S/P bypass surgery     Cerebrovascular Disease Maternal Grandmother      Cerebrovascular Disease Maternal Grandfather      Hypertension Mother         alive in her 80s     Hyperlipidemia Mother      Heart Disease Father         alive in his 80s     Deep Vein Thrombosis Father      Pulmonary Embolism Father      No Known Problems Brother       SOCIAL HISTORY:   Social History      Socioeconomic History     Marital status:      Number of children: 0   Occupational History     Occupation:      Comment: Losing job in 1 yr   Tobacco Use     Smoking status: Never     Smokeless tobacco: Never   Substance and Sexual Activity     Drug use: No     Sexual activity: Not Currently   Other Topics Concern      Service No     Blood Transfusions No     Caffeine Concern No     Occupational Exposure No     Hobby Hazards No     Sleep Concern No     Stress Concern Yes     Weight Concern No     Special Diet No     Back Care No     Exercise Yes     Comment: weights 2 x per week, aerobics 3 x per week     Bike Helmet Yes     Seat Belt Yes     Self-Exams Yes   Social History Narrative    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  She lives alone and does not have children and enjoys outdoor activities.        VITALS  Patient Vitals for the past 24 hrs:   BP Pulse Resp SpO2 Weight   06/09/23 0740 (!) 194/90 74 16 100 % 61.7 kg (136 lb)        ================================================================    PHYSICAL EXAM     VITAL SIGNS: BP (!) 194/90   Pulse 74   Resp 16   Wt 61.7 kg (136 lb)   SpO2 100%   BMI 22.29 kg/m     Constitutional:  Awake, no acute distress   HENT:  Atraumatic, oropharynx without exudate or erythema, membranes moist  Lymph:  No adenopathy  Eyes: EOM intact, PERRL, no injection  Neck: Supple  Respiratory:  Clear to auscultation bilaterally, no wheezes or crackles   Cardiovascular:  Regular rate and rhythm, single S1 and S2   GI:  Soft, nontender, nondistended, no rebound or guarding   Musculoskeletal:  Moves all extremities, no lower extremity edema, no deformities    Skin:  Warm, dry  Neurologic:  Alert and oriented x3, no focal deficits noted       ================================================================  LAB       All pertinent labs reviewed and interpreted.   Labs Ordered and Resulted  from Time of ED Arrival to Time of ED Departure   ROUTINE UA WITH MICROSCOPIC REFLEX TO CULTURE - Abnormal       Result Value    Color Urine Colorless      Appearance Urine Clear      Glucose Urine Negative      Bilirubin Urine Negative      Ketones Urine Negative      Specific Gravity Urine 1.002      Blood Urine Negative      pH Urine 6.5      Protein Albumin Urine Negative      Urobilinogen Urine <2.0      Nitrite Urine Negative      Leukocyte Esterase Urine Negative      Bacteria Urine Few (*)     RBC Urine <1      WBC Urine <1     BASIC METABOLIC PANEL - Normal    Sodium 143      Potassium 4.0      Chloride 107      Carbon Dioxide (CO2) 28      Anion Gap 8      Urea Nitrogen 16      Creatinine 0.77      Calcium 9.2      Glucose 84      GFR Estimate 83     TROPONIN I - Normal    Troponin I <0.01     MAGNESIUM - Normal    Magnesium 2.1     TSH WITH FREE T4 REFLEX - Normal    TSH 1.54     INFLUENZA A/B, RSV, & SARS-COV2 PCR - Normal    Influenza A PCR Negative      Influenza B PCR Negative      RSV PCR Negative      SARS CoV2 PCR Negative     CBC WITH PLATELETS AND DIFFERENTIAL    WBC Count 4.7      RBC Count 4.53      Hemoglobin 14.0      Hematocrit 43.2      MCV 95      MCH 30.9      MCHC 32.4      RDW 12.5      Platelet Count 298      % Neutrophils 51      % Lymphocytes 33      % Monocytes 12      % Eosinophils 3      % Basophils 1      % Immature Granulocytes 0      NRBCs per 100 WBC 0      Absolute Neutrophils 2.4      Absolute Lymphocytes 1.5      Absolute Monocytes 0.6      Absolute Eosinophils 0.2      Absolute Basophils 0.1      Absolute Immature Granulocytes 0.0      Absolute NRBCs 0.0          ===============================================================  RADIOLOGY       Reviewed all pertinent imaging. Please see official radiology report.   No orders to display         ================================================================  EKG       EKG reviewed and interpreted by me shows sinus rhythm  with rate of 70, left axis, QTc 460 with T wave flattening in V3, no other changes from previous  I have independently reviewed and interpreted the EKG(s) documented above.     ================================================================  PROCEDURES         I, Josephine Savage, am serving as a scribe to document services personally performed by Dr. Anthony based on my observation and the provider's statements to me. I, Thea Anthony MD attest that Josephine Savage is acting in a scribe capacity, has observed my performance of the services and has documented them in accordance with my direction.   Thea Anthony M.D.   Emergency Medicine   [facility] EMERGENCY ROOM  [address] [address]  [phone]  Dept: [phone]        Thea Anthony MD  06/09/23 0912

## 2023-06-12 ENCOUNTER — OFFICE VISIT (OUTPATIENT)
Dept: FAMILY MEDICINE | Facility: CLINIC | Age: 68
End: 2023-06-12
Payer: COMMERCIAL

## 2023-06-12 VITALS
DIASTOLIC BLOOD PRESSURE: 83 MMHG | WEIGHT: 136 LBS | HEART RATE: 73 BPM | RESPIRATION RATE: 15 BRPM | OXYGEN SATURATION: 98 % | SYSTOLIC BLOOD PRESSURE: 154 MMHG | TEMPERATURE: 98 F | HEIGHT: 66 IN | BODY MASS INDEX: 21.86 KG/M2

## 2023-06-12 DIAGNOSIS — J01.90 ACUTE NON-RECURRENT SINUSITIS, UNSPECIFIED LOCATION: Primary | ICD-10-CM

## 2023-06-12 DIAGNOSIS — R09.02 HYPOXIA: ICD-10-CM

## 2023-06-12 DIAGNOSIS — I10 ESSENTIAL HYPERTENSION, BENIGN: ICD-10-CM

## 2023-06-12 PROCEDURE — 99213 OFFICE O/P EST LOW 20 MIN: CPT | Performed by: PHYSICIAN ASSISTANT

## 2023-06-12 RX ORDER — FLUTICASONE PROPIONATE 50 MCG
1 SPRAY, SUSPENSION (ML) NASAL DAILY
Qty: 16 G | Refills: 0 | Status: SHIPPED | OUTPATIENT
Start: 2023-06-12 | End: 2023-07-19

## 2023-06-12 NOTE — PROGRESS NOTES
Assessment & Plan     Acute non-recurrent sinusitis, unspecified location  Work up negative at ER. Given exam and history, suggests eustachian tube dysfunction secondary to sinusitis. Given severity, will tx with abx and flonase with otc zyrtec. bp elevated today.avoid sudafed medications if possible given this. Maintain follow up with pcp for recheck as scheduled.   - fluticasone (FLONASE) 50 MCG/ACT nasal spray; Spray 1 spray into both nostrils daily  - amoxicillin-clavulanate (AUGMENTIN) 875-125 MG tablet; Take 1 tablet by mouth 2 times daily  Medication use and side effects discussed with the patient. Patient is in complete understanding and agreement with plan.     Hypoxia  Reported by patient for months. Consider dhaval. Recommending sleep consult.   - Adult Sleep Eval & Management  Referral; Future    Essential hypertension, benign  As above   Sumit Vigil PA-C  [facility] ANNA MARIE Winter is a 68 year old, presenting for the following health issues:  ER F/U        6/12/2023     1:16 PM   Additional Questions   Roomed by Connor Rollins CMA     hospitals     ED/UC Followup:    Facility:  [facility] Emergency Room  Date of visit: 6/9/2023   Reason for visit: Dizziness  Current Status: patient stated that she gets better when taking the decongestant and she is fine when sitting but when walking and turning in the comers she feels she is dizzy, she would like to know what is causing the dizziness. She stated she had vertigo in the past when she was in [place].        Also states for 3 months has noted while sleeping her oximeter is reading 90% and more fatigued in the AM.     Review of Systems   Constitutional, HEENT, cardiovascular, pulmonary, GI, , musculoskeletal, neuro, skin, endocrine and psych systems are negative, except as otherwise noted.      Objective    BP (!) 154/83 (BP Location: Right arm, Patient Position: Sitting, Cuff Size:  "Adult Regular)   Pulse 73   Temp 98  F (36.7  C) (Oral)   Resp 15   Ht 1.664 m (5' 5.5\")   Wt 61.7 kg (136 lb)   SpO2 98%   BMI 22.29 kg/m    Body mass index is 22.29 kg/m .  Physical Exam   GENERAL: healthy, alert and no distress  EYES: Eyes grossly normal to inspection, PERRL and conjunctivae and sclerae normal  HENT: normal cephalic/atraumatic, both ears: clear effusion, nasal mucosa edematous , oropharynx clear and oral mucous membranes moist  NECK: no adenopathy, no asymmetry, masses, or scars and thyroid normal to palpation  RESP: lungs clear to auscultation - no rales, rhonchi or wheezes  CV: regular rates and rhythm, normal S1 S2, no S3 or S4 and no murmur, click or rub  PSYCH: mentation appears normal, affect normal/bright                "

## 2023-06-22 ENCOUNTER — OFFICE VISIT (OUTPATIENT)
Dept: FAMILY MEDICINE | Facility: CLINIC | Age: 68
End: 2023-06-22
Payer: COMMERCIAL

## 2023-06-22 VITALS
BODY MASS INDEX: 22.57 KG/M2 | OXYGEN SATURATION: 95 % | DIASTOLIC BLOOD PRESSURE: 72 MMHG | HEART RATE: 92 BPM | SYSTOLIC BLOOD PRESSURE: 104 MMHG | WEIGHT: 137.7 LBS

## 2023-06-22 DIAGNOSIS — G47.34 NOCTURNAL HYPOXIA: ICD-10-CM

## 2023-06-22 DIAGNOSIS — R09.81 NASAL CONGESTION: Primary | ICD-10-CM

## 2023-06-22 DIAGNOSIS — H92.01 RIGHT EAR PAIN: ICD-10-CM

## 2023-06-22 DIAGNOSIS — R53.82 CHRONIC FATIGUE: ICD-10-CM

## 2023-06-22 PROCEDURE — 99214 OFFICE O/P EST MOD 30 MIN: CPT | Performed by: FAMILY MEDICINE

## 2023-06-22 PROCEDURE — 99207 E-CONSULT TO SLEEP MEDICINE (ADULT OUTPT PROVIDER TO SPECIALIST WRITTEN QUESTION & RESPONSE): CPT | Performed by: FAMILY MEDICINE

## 2023-06-22 NOTE — PROGRESS NOTES
"  Assessment & Plan     Nasal congestion  Right ear pain  With some balance concerns though the vertigo part has nearly resolved No hearing loss or tinnitus. She was advised to add nasal rinses and continue flonase; at this point holding off on further abx (s/p Augmentin course on 6/12). She was offered referral to ENT as well.    - Adult ENT  Referral; Future    Nocturnal hypoxia  Daytime fatigue   pt reporting home Apple watch data showing nighttime oxygen levels in the 89-92% range several nights; generally 92-94% however. She does also endorse daytime fatigue or tiredness when waking. Pt agrees to e-consult with Sleep medicine. Appreciate recs for home sleep study or not- she otherwise has minimal JAIME risk factors. (STOP bang score of 2-3  for tired, age >50; diet controlled HTN not on meds)        iCara Moe MD  [facility]    Alize Winter is a 68 year old, presenting for the following health issues:  Sinus Problem (Finished abx from being seen recently for lightheadedness, still feels unresolved) and Sleep Problem (O2 levels at night have been dropping at night- see [employer] messages.)      Concern for lower oxygen levels at night based on her April watch data since about December with daytime sleepiness or fatigue increased from baseline.     [employer] message from early June- see attached document: \"I have a Sleep lucinda and watch the hours, interruptions, SPO2%, etc.  My sleep data shows I'm in the Good range for my age on everything by SPO2%.  The SPO2% range has been 89% to 94%.  I feel like I am getting enough hours of sleep but I wake up tired.  I tested the Apple data against my Pulse Oximeter and it was right on.  Recent data is attached. The data is representative of the data since 10/10/23.\"      Vertigo concerns: She has also been dealing with some lightheadedness and balance concerns. Was seen in ER on 6/9 for the symptoms with reassuring labs, " "UA and EKG/troponin. 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  At that time they advised a Coricidin decongestant and Zrytec.   Then seen on 6/12 for continued sx and feeling off balance and sinus congestion. She was given a course of Augmentin and flonase.     She was able to paddle board yesterday and did well; but when tired at evening she felt more wobbly.       15 years ago was when she had a second bout of vertigo.  An Ear/Nose/Throat doctor did some testing then and it appears a virus (first vertigo) did some damage to my right ear so I am tend to get motion sick easily.           6/22/2023    10:50 AM   Additional Questions   Roomed by Summer SOLITARIO LPN     Sinus Problem     History of Present Illness       Reason for visit:  Follow-up on visit for sinus infection and balance issues.  SPO2 being low  Symptom onset:  1-2 weeks ago  Symptoms include:  Off balance randomly and ear ache.  Low SPO2 at night  Symptom intensity:  Moderate  Symptom progression:  Improving  Had these symptoms before:  Yes  Has tried/received treatment for these symptoms:  Yes  Previous treatment was successful:  Yes  Prior treatment description:  Tests for inner ear damage which was found in my right ear.  Probably from a virus.  Took 1/2 a Motion Sickness pill when felt off balance.  What makes it worse:  I have more off balance episodes when I'm tired.  The decongestant seemed to improve the SPO2 at night.    She eats 4 or more servings of fruits and vegetables daily.She consumes 0 sweetened beverage(s) daily.She exercises with enough effort to increase her heart rate 30 to 60 minutes per day.  She exercises with enough effort to increase her heart rate 5 days per week.   She is taking medications regularly.      Review of Systems   Constitutional, HEENT, cardiovascular, pulmonary, gi and gu systems are negative, except as otherwise noted.      Objective    /72 (BP Location: Left arm, Patient " Position: Sitting, Cuff Size: Adult Regular)   Pulse 92   Wt 62.5 kg (137 lb 11.2 oz)   SpO2 95%   BMI 22.57 kg/m    Body mass index is 22.57 kg/m .  Physical Exam   GENERAL: healthy, alert and no distress  EYES: Eyes grossly normal to inspection, PERRL and conjunctivae and sclerae normal  HENT: ear canals and TM's normal, nose and mouth without ulcers or lesions  NECK: no adenopathy, no asymmetry, masses, or scars and thyroid normal to palpation  RESP: lungs clear to auscultation - no rales, rhonchi or wheezes  CV: regular rate and rhythm, normal S1 S2, no S3 or S4, no murmur, click or rub, no peripheral edema and peripheral pulses strong  ABDOMEN: soft, nontender, no hepatosplenomegaly, no masses and bowel sounds normal  MS: no gross musculoskeletal defects noted, no edema

## 2023-07-16 DIAGNOSIS — J01.90 ACUTE NON-RECURRENT SINUSITIS, UNSPECIFIED LOCATION: ICD-10-CM

## 2023-07-18 DIAGNOSIS — N95.9 MENOPAUSAL AND POSTMENOPAUSAL DISORDER: ICD-10-CM

## 2023-07-18 NOTE — TELEPHONE ENCOUNTER
"Routing refill request to provider for review/approval because:  Early refill request    Last Written Prescription Date:  2/21/23  Last Fill Quantity: 12,  # refills: 2   Last office visit provider:   6/22/23 with jose l    Requested Prescriptions   Pending Prescriptions Disp Refills     estradiol (FEMPATCH) 0.025 MG/24HR weekly patch 12 patch 2     Sig: APPLY 1 PATCH ONCE A WEEK Strength: 0.025 MG/24HR       Hormone Replacement Therapy Passed - 7/18/2023  4:08 PM        Passed - Blood pressure under 140/90 in past 12 months     BP Readings from Last 3 Encounters:   06/22/23 104/72   06/12/23 (!) 154/83   06/09/23 134/79                 Passed - Recent (12 mo) or future (30 days) visit within the authorizing provider's specialty     Patient has had an office visit with the authorizing provider or a provider within the authorizing providers department within the previous 12 mos or has a future within next 30 days. See \"Patient Info\" tab in inbasket, or \"Choose Columns\" in Meds & Orders section of the refill encounter.              Passed - Patient has mammogram in past 2 years on file if age 50-75        Passed - Medication is active on med list        Passed - Patient is 18 years of age or older        Passed - No active pregnancy on record        Passed - No positive pregnancy test on record in past 12 months             DANETTE DUMONT RN 07/18/23 4:08 PM  "

## 2023-07-19 RX ORDER — ESTRADIOL 0.03 MG/D
PATCH TRANSDERMAL
Qty: 12 PATCH | Refills: 3 | Status: SHIPPED | OUTPATIENT
Start: 2023-07-19 | End: 2023-12-07

## 2023-07-19 RX ORDER — FLUTICASONE PROPIONATE 50 MCG
SPRAY, SUSPENSION (ML) NASAL
Qty: 16 ML | Refills: 0 | Status: SHIPPED | OUTPATIENT
Start: 2023-07-19 | End: 2023-09-25

## 2023-07-19 NOTE — TELEPHONE ENCOUNTER
Prescription approved per [facility] Refill Protocol.    Idania LASSITER RN, BSN, PHN

## 2023-07-21 ENCOUNTER — TRANSFERRED RECORDS (OUTPATIENT)
Dept: HEALTH INFORMATION MANAGEMENT | Facility: CLINIC | Age: 68
End: 2023-07-21

## 2023-08-28 ENCOUNTER — TRANSFERRED RECORDS (OUTPATIENT)
Dept: HEALTH INFORMATION MANAGEMENT | Facility: CLINIC | Age: 68
End: 2023-08-28

## 2023-08-29 ENCOUNTER — TRANSFERRED RECORDS (OUTPATIENT)
Dept: HEALTH INFORMATION MANAGEMENT | Facility: CLINIC | Age: 68
End: 2023-08-29
Payer: COMMERCIAL

## 2023-09-24 DIAGNOSIS — J01.90 ACUTE NON-RECURRENT SINUSITIS, UNSPECIFIED LOCATION: ICD-10-CM

## 2023-09-25 RX ORDER — FLUTICASONE PROPIONATE 50 MCG
SPRAY, SUSPENSION (ML) NASAL
Qty: 16 ML | Refills: 0 | Status: SHIPPED | OUTPATIENT
Start: 2023-09-25 | End: 2024-09-11

## 2023-10-05 ENCOUNTER — ANCILLARY ORDERS (OUTPATIENT)
Dept: RADIOLOGY | Facility: CLINIC | Age: 68
End: 2023-10-05

## 2023-10-05 DIAGNOSIS — Z12.31 VISIT FOR SCREENING MAMMOGRAM: Primary | ICD-10-CM

## 2023-10-06 ENCOUNTER — TRANSCRIBE ORDERS (OUTPATIENT)
Dept: OTHER | Age: 68
End: 2023-10-06

## 2023-10-06 DIAGNOSIS — R42 DIZZINESS AND GIDDINESS: Primary | ICD-10-CM

## 2023-10-24 ENCOUNTER — THERAPY VISIT (OUTPATIENT)
Dept: OCCUPATIONAL THERAPY | Facility: REHABILITATION | Age: 68
End: 2023-10-24
Payer: COMMERCIAL

## 2023-10-24 DIAGNOSIS — R42 DIZZINESS: Primary | ICD-10-CM

## 2023-10-24 DIAGNOSIS — R26.81 UNSTEADINESS: ICD-10-CM

## 2023-10-24 PROCEDURE — 97165 OT EVAL LOW COMPLEX 30 MIN: CPT | Mod: GO | Performed by: OCCUPATIONAL THERAPIST

## 2023-10-24 PROCEDURE — 97112 NEUROMUSCULAR REEDUCATION: CPT | Mod: GO | Performed by: OCCUPATIONAL THERAPIST

## 2023-10-24 NOTE — PROGRESS NOTES
"OCCUPATIONAL THERAPY EVALUATION  Type of Visit: Evaluation    See electronic medical record for Abuse and Falls Screening details.    Presenting condition or subjective complaint: Patient had first episode of vertigo about 30 years ago with full resolution of symptoms. Her next episode was in 2004 with VNG showing right hypofunction(per patient report). Patient recently saw ENT with hearing tests showing normal to mild loss in left ear and normal to moderate loss in right ear. Patient reports that she had her 3rd onset of dizziness on June 9th, 2023 and that her symptoms are now nearly resolved but she has slight \"off balance\" sensation.   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  Prior therapy history for the same diagnosis, illness or injury: No      Prior Level of Function- no significant change in function    Living Environment  Social support:     Type of home: [facility]   Stairs to enter the home: No       Ramp: No   Stairs inside the home: Yes 12 Is there a railing: Yes   Help at home: None  Equipment owned:       Employment: No    Hobbies/Interests: walking, functional training, paddle boarding, volunteering    Patient goals for therapy: How to manage the vertigo when it happens    Pain assessment: Pain denied     Objective     VISUAL SKILLS  Visual Acuity: No deficits identified, Wears glasses  Visual Field: Appears normal  Visual Attention: Appears normal  Oculomotor: NT    SENSATION: UE Sensation WNL, LE Sensation WNL    BALANCE: PEYTON SOP was WNL at recent  ENT    FUNCTIONAL MOBILITY  Assistive Device(s): None  Ambulation: WNL  Wheelchair: N/A    ACTIVITY TOLERANCE: Good         VESTIBULAR EVALUATION  ADDITIONAL HISTORY:  Description of symptoms: Off " balance  Dizzy attacks:   Start: June 9, 2023   Last attack: 10/23/2023  - slight wobble   Frequency of occurrences: randomly but not very often now   Length of attack: split second  Difficulty hearing:    Noise in ears? No    Alleviates symptoms: Dramamine  Worsens symptoms: tired  Activities that bring on symptoms: Turning while walking       Oculomotor Screen    Ocular ROM Normal   Smooth Pursuit Normal   Saccades Abnormal and undershoots with eyes right     Infrared Goggle Exam Vestibular Suppressant in Last 24 Hours? No  Exam Completed With: Infrared goggles   Spontaneous Nystagmus Negative   Gaze Evoked Nystagmus Negative   Head Shake Horizontal Nystagmus No nystagmus however patient had dizziness    Left Right   Mapleton-Hallpike NT NT   Sidelying Test NT NT   Patient had negative HT and negative DH at recent ENT visit.      Assessment & Plan   CLINICAL IMPRESSIONS  Medical Diagnosis: dizziness    Treatment Diagnosis: vertigo, unsteadiness    Impression/Assessment: Pt is a 68 year old female presenting to Occupational Therapy due to recent episode of dizziness and unsteadiness.  The following significant findings have been identified: Impaired balance and dizziness .  These identified deficits interfere with their ability to perform recreational activities and household chores as compared to previous level of function.     Clinical Decision Making (Complexity):  Assessment of Occupational Performance: 1-3 Performance Deficits  Occupational Performance Limitations: functional mobility  Clinical Decision Making (Complexity): Low complexity    PLAN OF CARE  Treatment Interventions:  Interventions: Neuromuscular Re-education    Long Term Goals   OT Goal 1  Goal Description: Patient will have no dizziness or vertigo with head motion  Target Date: 01/16/24      Frequency of Treatment: once a week  Duration of Treatment: 12 weeks     Recommended Referrals to Other Professionals: none  Education Assessment:  Learner/Method: Patient     Risks and benefits of evaluation/treatment have been explained.   Patient agrees with Plan of Care.     Evaluation Time:    OT Eval, Low Complexity Minutes (44541): 25      Signing Clinician: ALVINA Briggs [facility]                                                                                   OUTPATIENT OCCUPATIONAL THERAPY      PLAN OF TREATMENT FOR OUTPATIENT REHABILITATION   Patient's Last Name, First Name, Suzanne Anthony YOB: 1955   Provider's Name   [facility]   Medical Record No.  [id number]     Onset Date: 06/09/23 Start of Care Date: 10/24/23     Medical Diagnosis:  dizziness      OT Treatment Diagnosis:  vertigo, unsteadiness Plan of Treatment  Frequency/Duration:once a week/12 weeks    Certification date from 10/24/23   To 01/16/24        See note for plan of treatment details and functional goals     Rhona Flores OT                         I CERTIFY THE NEED FOR THESE SERVICES FURNISHED UNDER        THIS PLAN OF TREATMENT AND WHILE UNDER MY CARE .             Physician Signature               Date    X_____________________________________________________                    Referring Provider:  Kaity Kohli PA-C      Initial Assessment  See Epic Evaluation- 10/24/23

## 2023-11-30 ASSESSMENT — SLEEP AND FATIGUE QUESTIONNAIRES
HOW LIKELY ARE YOU TO NOD OFF OR FALL ASLEEP WHILE SITTING AND READING: MODERATE CHANCE OF DOZING
HOW LIKELY ARE YOU TO NOD OFF OR FALL ASLEEP IN A CAR, WHILE STOPPED FOR A FEW MINUTES IN TRAFFIC: WOULD NEVER DOZE
HOW LIKELY ARE YOU TO NOD OFF OR FALL ASLEEP WHILE WATCHING TV: WOULD NEVER DOZE
HOW LIKELY ARE YOU TO NOD OFF OR FALL ASLEEP WHILE LYING DOWN TO REST IN THE AFTERNOON WHEN CIRCUMSTANCES PERMIT: SLIGHT CHANCE OF DOZING
HOW LIKELY ARE YOU TO NOD OFF OR FALL ASLEEP WHILE SITTING AND TALKING TO SOMEONE: WOULD NEVER DOZE
HOW LIKELY ARE YOU TO NOD OFF OR FALL ASLEEP WHILE SITTING INACTIVE IN A PUBLIC PLACE: WOULD NEVER DOZE
HOW LIKELY ARE YOU TO NOD OFF OR FALL ASLEEP WHEN YOU ARE A PASSENGER IN A CAR FOR AN HOUR WITHOUT A BREAK: SLIGHT CHANCE OF DOZING
HOW LIKELY ARE YOU TO NOD OFF OR FALL ASLEEP WHILE SITTING QUIETLY AFTER LUNCH WITHOUT ALCOHOL: SLIGHT CHANCE OF DOZING

## 2023-12-01 ENCOUNTER — OFFICE VISIT (OUTPATIENT)
Dept: SLEEP MEDICINE | Facility: CLINIC | Age: 68
End: 2023-12-01
Attending: PHYSICIAN ASSISTANT
Payer: COMMERCIAL

## 2023-12-01 VITALS
HEIGHT: 66 IN | OXYGEN SATURATION: 98 % | RESPIRATION RATE: 18 BRPM | WEIGHT: 138 LBS | BODY MASS INDEX: 22.18 KG/M2 | HEART RATE: 60 BPM | DIASTOLIC BLOOD PRESSURE: 72 MMHG | SYSTOLIC BLOOD PRESSURE: 118 MMHG

## 2023-12-01 DIAGNOSIS — G47.36 HYPOXEMIA ASSOCIATED WITH SLEEP: Primary | ICD-10-CM

## 2023-12-01 PROBLEM — R09.02 HYPOXIA: Status: ACTIVE | Noted: 2023-12-01

## 2023-12-01 PROCEDURE — 99203 OFFICE O/P NEW LOW 30 MIN: CPT | Performed by: STUDENT IN AN ORGANIZED HEALTH CARE EDUCATION/TRAINING PROGRAM

## 2023-12-01 NOTE — PROGRESS NOTES
[facility]  Consultation Note    Name: Suzanne Malik MRN#: [id number]   Age: 68 year old YOB: 1955     Date of Consultation: 12/01/23  Consultation is requested by: Sumit Vigil  Primary care provider: Ciara Moe MD    History of Present Illness:   Suzanne Malik is a 68 year old female patient with R hip OA, sinus congestion, and postmenopausal syndrome  She is sent by Sumit Vigil for a sleep consultation regarding Consult For (Hypoxia)  .    Suzanne Malik main reason for visit: My iWatch shows my Blood O2 levels to be between 89-92% at night  Patient states problem(s) started: May 2023  Suzanne Malik's goals for this visit: I would like to know if the low O2 levels are something I need to be concerned about and if so, what to do about it.  I don't feel rested when I get up in the morning even if I sleep 7 to 8 hours..    She has not had a previous sleep study.    CPAP: No    Assessment and Plan:     Problem List Items Addressed This Visit          Endocrine    Hypoxemia associated with sleep - Primary     Patient concerned about oxygen saturation during sleep based on Apple Watch data.  Apple watch data reviewed.  Negative not concerning for true hypoxemia during sleep.  Data only demonstrates spots oxygen saturations and does not provide continuous oxygen saturation.  Additionally patient is low risk for sleep apnea.  STOP-BANG 1/8, denies EDS with ESS 5/24, and denies insomnia with ALEXIS 10/28.  She has talked to multiple of her friends with JAIME of both her symptoms prior to starting CPAP, and she reports that she did not have any of the symptoms that they described prior to starting CPAP.  Her main source of sleep fragmentation is due to right hip pain.  This pain only occurs when she sleeps in her own bed.  When she sleeps and other beds she does not have this right hip pain.  It is likely that her right hip pain is due to her home  bed.  Recommend no further evaluation for hypoxemia during sleep or for JAIME  Patient may elect to switch bedding that does not exacerbate right hip pain            SLEEP-WAKE SCHEDULE:   Work/School Days: Patient goes to school/work: No   Usually gets into bed at 10:30 to 11 pm  Takes patient about Not very long but I take 3 mg of Melatonin before bed. to fall asleep  Has trouble falling asleep Maybe once a week. nights per week  Wakes up in the middle of the night MAybe once a week I'll wake up during the night.  Sometimes it is a stomachache, or caffeine in the afternoon, or my right hip hurts. times.  Wakes up due to Pain;Use the bathroom;Other  She has trouble falling back asleep Once every couple weeks I'll wake up between 2 am and 4 am.  I'll go to the bathroom, then read and I will eventually fall asleep within an hour. times a week.   It usually takes If my hip hurts I go back to sleep quickly.  If my stomach hurts and I eat, then it will be 1 to 2 hours before I go back to sleep. to get back to sleep  Patient is usually up at In the summer around 7 am.  In the winter around 8 am.  Uses alarm: No  Sleep Inertia: No    Weekends/Non-work Days/All Other Days:  Usually gets into bed at 10:30 to 11:30 pm   Takes patient about I'm retired so weekends are like weekdays to fall asleep  Patient is usually up at same as during the week  Uses alarm: No    Sleep Need  Patient gets  7 hours sleep on average   Patient thinks She needs about I don't know sleep    Suzanne S Malik prefers to sleep in this position(s): Back;Side   Patient states they do the following activities in bed: Read;Eat;Use phone, computer, or tablet    Naps  Patient takes a purposeful nap rarely times a week and naps are usually If I do, an hour.  It would be on the days I was up for 1 - 2 hours in the middle of the night. in duration  She feels better after a nap: Yes  She dozes off unintentionally Never days per week  Patient has had a driving  accident or near-miss due to sleepiness/drowsiness: No      SLEEP DISRUPTIONS:  Breathing/Snoring  Patient snores:No  Other people complain about Her snoring: No  Patient has been told She stops breathing in Her sleep:No  She has issues with the following: Stuffy nose when you wake up    Movement:  Patient gets pain, discomfort, with an urge to move:  Yes  It happens when She is resting:  No  It happens more at night:  Yes  Patient has been told Suzanne Malik kicks Her legs at night:  No     Behaviors in Sleep:  Suzanne Malik has experienced the following behaviors while sleeping: Eating;Teeth grinding;Night terrors (screaming,yelling or acting afraid but not recalling event)  She has experienced sudden muscle weakness during the day: No     Is there anything else you would like your sleep provider to know: I went to [facility], they diagnosed me with polyps in my nose (more on right side) caused by hay fever.  My nose is still open though.      CAFFEINE AND OTHER SUBSTANCES:  Patient consumes caffeinated beverages per day:  Typically 0-1 (coffee or ice tea)  Last caffeine use is usually: Usually in the morning.  I am sensitive to caffeine. If I have cold srinath coffee in the afternoon, I will wake up around 2 am.  List of any prescribed or over the counter stimulants that patient takes: none  List of any prescribed or over the counter sleep medication patient takes: 3 mg of Melatonin  List of previous sleep medications that patient has tried: none  Patient drinks alcohol to help them sleep: No  Patient drinks alcohol near bedtime: No    Family History:  Patient has a family member been diagnosed with a sleep disorder: No          SCALES:  EPWORTH SLEEPINESS SCALE       11/30/2023     8:47 PM    [place] Sleepiness Scale ( ISSA Atkins  [id number]<br>ESS - [place]/English - Final version - 21 Nov 07 - [facility] [place].)   Sitting and reading Moderate chance of dozing   Watching TV Would never doze  "  Sitting, inactive in a public place (e.g. a theatre or a meeting) Would never doze   As a passenger in a car for an hour without a break Slight chance of dozing   Lying down to rest in the afternoon when circumstances permit Slight chance of dozing   Sitting and talking to someone Would never doze   Sitting quietly after a lunch without alcohol Slight chance of dozing   In a car, while stopped for a few minutes in traffic Would never doze   Strang Score (MC) 5   Strang Score (Sleep) 5       INSOMNIA SEVERITY INDEX (ALEXIS)        11/30/2023     7:45 PM   Insomnia Severity Index (ALEXIS)   Difficulty falling asleep 1   Difficulty staying asleep 1   Problems waking up too early 1   How SATISFIED/DISSATISFIED are you with your CURRENT sleep pattern? 3   How NOTICEABLE to others do you think your sleep problem is in terms of impairing the quality of your life? 0   How WORRIED/DISTRESSED are you about your current sleep problem? 2   To what extent do you consider your sleep problem to INTERFERE with your daily functioning (e.g. daytime fatigue, mood, ability to function at work/daily chores, concentration, memory, mood, etc.) CURRENTLY? 2   ALEXIS Total Score 10    Guidelines for Scoring/Interpretation:  Total score categories:  0-7 = No clinically significant insomnia   8-14 = Subthreshold insomnia   15-21 = Clinical insomnia (moderate severity)  22-28 = Clinical insomnia (severe)  Used via courtesy of www.WebChaletealth.va.gov with permission from Robin Viera PhD., [facility]      STOP BANG   JAIME Low Risk            Total Score: 1    JAIME: Over 50 ys old          GAD7      7/16/2020    10:20 AM   MAURO-7    1. Feeling nervous, anxious, or on edge 0   2. Not being able to stop or control worrying 0         CAGE-AID       No data to display              CAGE-AID reprinted with permission from the [place] Medical Journal, REBECA Hudson. and JANIS Parker, \"Conjoint screening questionnaires for alcohol and drug abuse\" [place] " Medical Journal 94: 135-140, 1995.      PATIENT HEALTH QUESTIONNAIRE-9 (PHQ - 9)      7/16/2020    10:20 AM   PHQ-9 ([employer])   1.  Little interest or pleasure in doing things 0   2.  Feeling down, depressed, or hopeless 0     Developed by Cheri Giordano, Concetta Klein, Percy Hooks and colleagues, with an educational daniel from [employer]. No permission required to reproduce, translate, display or distribute.      Allergies:    Allergies   Allergen Reactions    Ragweeds      Food oral allergy syndrome       Medications:    Current Outpatient Medications   Medication Sig Dispense Refill    CALCIUM + D OR 1 TABLET DAILY      estradiol (FEMPATCH) 0.025 MG/24HR weekly patch APPLY 1 PATCH ONCE A WEEK Strength: 0.025 MG/24HR 12 patch 3    fluticasone (FLONASE) 50 MCG/ACT nasal spray SPRAY 1 SPRAY INTO EACH NOSTRIL DAILY 16 mL 0    PATADAY 0.2 % OP SOLN Apply to eye as needed       Probiotic Product (PROBIOTIC PO) Take by mouth daily Low histamine probiotic      VITAMIN B COMPLEX OR None Entered      VITAMIN C 500 MG OR TABS 1 TABLET TWICE DAILY      amoxicillin-clavulanate (AUGMENTIN) 875-125 MG tablet Take 1 tablet by mouth 2 times daily 14 tablet 0    azelastine (ASTELIN) 0.1 % nasal spray Spray 2 sprays into both nostrils 2 times daily (Patient not taking: Reported on 6/22/2023) 30 mL 3    Chlorphen-Phenyleph-Ibuprofen (ADVIL ALLERGY & CONGESTION PO)          Problem List:  Patient Active Problem List    Diagnosis Date Noted    Hypoxia 12/01/2023     Priority: Medium    Hypoxemia associated with sleep 12/01/2023     Priority: Medium    Disorder of bone and cartilage 11/16/2022     Priority: Medium     Formatting of this note might be different from the original.  Created by Conversion    Replacement Utility updated for latest IMO load      Mixed hyperlipidemia 07/19/2021     Priority: Medium     Formatting of this note might be different from the original.  Created by Conversion      Osteoarthrosis  07/19/2021     Priority: Medium     Formatting of this note might be different from the original.  Created by Conversion    Replacement Utility updated for latest IMO load      Essential hypertension, benign 11/07/2007     Priority: Medium    Menopausal and postmenopausal disorder      Priority: Medium     Problem list name updated by automated process. Provider to review      Allergic state      Priority: Medium     Problem list name updated by automated process. Provider to review      Migraine      Priority: Medium     Problem list name updated by automated process. Provider to review          Past Medical/Surgical History:  Past Medical History:   Diagnosis Date    Allergy, unspecified not elsewhere classified     Basal cell cancer 2014    Breast cyst 2008    Left breast    Breast cyst 2011    Right breast    Diffuse cystic mastopathy     Essential hypertension, benign     Intestinal disaccharidase deficiencies and disaccharide malabsorption     Migraine, unspecified, without mention of intractable migraine without mention of status migrainosus     Unspecified menopausal and postmenopausal disorder      Past Surgical History:   Procedure Laterality Date    BASAL CELL CARCINOMA EXCISION  2014    left shoulder    BREAST CYST ASPIRATION Left 2008    BREAST CYST ASPIRATION Right 2011    HC REMOVAL OF TONSILS,<11 Y/O      Description: Tonsillectomy;  Proc Date: 01/01/1957;    HC REMOVE TONSILS/ADENOIDS,<9 Y/O      HYSTERECTOMY      OOPHORECTOMY      [facility] APPENDECTOMY  8 years old    [facility] APPENDECTOMY      Description: Appendectomy;  Proc Date: 01/01/1963;    Z TOTAL ABDOM HYSTERECTOMY  1998    for adenomyosis    [facility] VAG HYST,RMV TUBE/OVARY  01/1998       Social History:  Social History     Socioeconomic History    Marital status:      Spouse name: Not on file    Number of children: 0    Years of education: Not on file    Highest education level: Not on file   Occupational History    Occupation: Marketing  specialist     Comment: Losing job in 1 yr   Tobacco Use    Smoking status: Never    Smokeless tobacco: Never   Vaping Use    Vaping Use: Never used   Substance and Sexual Activity    Alcohol use: Not on file     Comment: 1-2/week    Drug use: No    Sexual activity: Not Currently   Other Topics Concern     Service No    Blood Transfusions No    Caffeine Concern No    Occupational Exposure No    Hobby Hazards No    Sleep Concern No    Stress Concern Yes    Weight Concern No    Special Diet No    Back Care No    Exercise Yes     Comment: weights 2 x per week, aerobics 3 x per week    Bike Helmet Yes    Seat Belt Yes    Self-Exams Yes   Social History Narrative    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  She lives alone and does not have children and enjoys outdoor activities.     Social Determinants of Health     Financial Resource Strain: Not on file   Food Insecurity: Not on file   Transportation Needs: Not on file   Physical Activity: Not on file   Stress: Not on file   Social Connections: Not on file   Interpersonal Safety: Not on file   Housing Stability: Not on file       Family History:  Family History   Problem Relation Age of Onset    Hypertension Father     C.A.D. Father         S/P bypass surgery    Cerebrovascular Disease Maternal Grandmother     Cerebrovascular Disease Maternal Grandfather     Hypertension Mother         alive in her 80s    Hyperlipidemia Mother     Heart Disease Father         alive in his 80s    Deep Vein Thrombosis Father     Pulmonary Embolism Father     No Known Problems Brother        Review of Systems:   A complete review of systems reviewed by me is negative with the exeption of what has been mentioned in the history of present illness.  In the last TWO WEEKS have you experienced any of the following symptoms?  Fevers: No  Night Sweats: No  Weight Gain: No  Pain at Night: Yes  Double Vision: No  Changes in Vision:  "No  Difficulty Breathing through Nose: No  Sore Throat in Morning: No  Dry Mouth in the Morning: No  Shortness of Breath Lying Flat: No  Shortness of Breath With Activity: No  Awakening with Shortness of Breath: No  Increased Cough: No  Heart Racing at Night: No  Swelling in Feet or Legs: No  Diarrhea at Night: No  Heartburn at Night: No  Urinating More than Once at Night: No  Losing Control of Urine at Night: No  Joint Pains at Night: Yes  Headaches in Morning: Yes  Weakness in Arms or Legs: No  Depressed Mood: Yes  Anxiety: No     Physical Exam:   Vitals: /72   Pulse 60   Resp 18   Ht 1.664 m (5' 5.5\")   Wt 62.6 kg (138 lb)   SpO2 98%   BMI 22.62 kg/m    BMI= Body mass index is 22.62 kg/m .  Neck Cir (cm): 36 cm  GENERAL: Healthy, alert and no distress  EYES: Eyes grossly normal to inspection.  No discharge or erythema, or obvious scleral/conjunctival abnormalities.  RESP: No audible wheeze, cough, or visible cyanosis.  No visible retractions or increased work of breathing.    SKIN: Visible skin clear. No significant rash, abnormal pigmentation or lesions.  NEURO: Cranial nerves grossly intact.  Mentation and speech appropriate for age.  PSYCH: Mentation appears normal, affect normal/bright, judgement and insight intact, normal speech and appearance well-groomed.         Data: All pertinent previous laboratory data reviewed     Recent Labs   Lab Test 06/09/23  0808 11/16/22  0923 07/19/21  1020     --  142   POTASSIUM 4.0  --  4.4   CHLORIDE 107  --  104   CO2 28  --  29   ANIONGAP 8  --  9   GLC 84 79 86   BUN 16  --  14   CR 0.77  --  0.78   KATALINA 9.2  --  9.7       Recent Labs   Lab Test 06/09/23  0808   WBC 4.7   RBC 4.53   HGB 14.0   HCT 43.2   MCV 95   MCH 30.9   MCHC 32.4   RDW 12.5          Recent Labs   Lab Test 07/19/21  1020   PROTTOTAL 7.0   ALBUMIN 4.1   BILITOTAL 0.7   ALKPHOS 67   AST 25   ALT 17       TSH   Date Value   06/09/2023 1.54 uIU/mL   07/19/2021 1.00 uIU/mL " "  11/07/2007 1.14 mU/L   10/25/2007 2.02 mcU/mL       No results found for: \"UAMP\", \"UBARB\", \"BENZODIAZEUR\", \"UCANN\", \"UCOC\", \"OPIT\", \"UPCP\"    No results found for: \"IRONSAT\", \"PH81571\", \"FIDE\"    No results found for: \"PH\", \"PHARTERIAL\", \"PO2\", \"GM4FSNMOWKD\", \"SAT\", \"PCO2\", \"HCO3\", \"BASEEXCESS\", \"IVÁN\", \"BEB\"    @LABRCNTIPR(phv:4,pco2v:4,po2v:4,hco3v:4,ashok:4,o2per:4)@    Echocardiology: No results found for this or any previous visit (from the past 4320 hour(s)).    Chest x-ray: No results found for this or any previous visit from the past 365 days.      Chest CT: No results found for this or any previous visit from the past 365 days.      PFT: Most Recent Breeze Pulmonary Function Testing  No results found     Patient was strongly advised to avoid driving, operating any heavy machinery or other hazardous situations while drowsy or sleepy.  Patient was counseled on the importance of driving while alert, to pull over if drowsy, or nap before getting into the vehicle if sleepy.      Plan is for Suzanne Malik to follow up PRN.     The above note was dictated using voice recognition software. Although reviewed after completion, some word and grammatical error may remain . Please contact the author for any clarifications.    Total time spent reviewing medical records, history and physical examination, review of previous testing and interpretation as well as documentation on this date, 12/01/23: 40 minutes    Hernán Edwards MD on 10/20/2022   [facility]   Floor 1, [address]   [address] [place] [address]   Appointments: [phone] [facility]   Floor 1, [address]   [address] [place] [address]   Appointments: [phone]     CC: Sumit Vigil"

## 2023-12-01 NOTE — ASSESSMENT & PLAN NOTE
Patient concerned about oxygen saturation during sleep based on Apple Watch data.  Apple watch data reviewed.  Negative not concerning for true hypoxemia during sleep.  Data only demonstrates spots oxygen saturations and does not provide continuous oxygen saturation.  Additionally patient is low risk for sleep apnea.  STOP-BANG 1/8, denies EDS with ESS 5/24, and denies insomnia with ALEXIS 10/28.  She has talked to multiple of her friends with JAIME of both her symptoms prior to starting CPAP, and she reports that she did not have any of the symptoms that they described prior to starting CPAP.  Her main source of sleep fragmentation is due to right hip pain.  This pain only occurs when she sleeps in her own bed.  When she sleeps and other beds she does not have this right hip pain.  It is likely that her right hip pain is due to her home bed.  Recommend no further evaluation for hypoxemia during sleep or for JAIME  Patient may elect to switch bedding that does not exacerbate right hip pain

## 2023-12-01 NOTE — NURSING NOTE
"Chief Complaint   Patient presents with    Consult For     Hypoxia       Initial /72   Pulse 60   Resp 18   Ht 1.664 m (5' 5.5\")   Wt 62.6 kg (138 lb)   SpO2 98%   BMI 22.62 kg/m   Estimated body mass index is 22.62 kg/m  as calculated from the following:    Height as of this encounter: 1.664 m (5' 5.5\").    Weight as of this encounter: 62.6 kg (138 lb).    Medication Reconciliation: complete    Neck circumference:  inches / 38 centimeters.    DME:     Margot Morley MA  [facility]    "

## 2023-12-01 NOTE — PATIENT INSTRUCTIONS
"MY INFORMATION ON SLEEP APNEA-  Suzanne Malik    DOCTOR : Hernán Edwards MD  SLEEP CENTER :      MY CONTACT NUMBER:    [facility]   [phone]  [facility]    Am I having a sleep study at a sleep center?  Make sure you have an appointment for the study before you leave!  [email or web address]?[email or web address]    Am I having a home sleep study?  Watch the video for the device you are using:  /drop off device, Cosentialurnal T-3: https://www.Vikiube.com/watch?v=yGGFBdELGhk  Disposable device sent out require phone/computer application, WatchPAT1: [email or web address]/watch?v=BCce_vbiwxE  The sleep lab will call you for this appointment   If you wish to reschedule the sleep study or contact the sleep lab scheduling call [phone]        Frequently asked questions:  1. What is Obstructive Sleep Apnea (JAIME)? JAIME is the most common type of sleep apnea. Apnea means, \"without breath.\"  Apnea is most often caused by narrowing or collapse of the upper airway as muscles relax during sleep.   Almost everyone has occasional apneas. Most people with sleep apnea have had brief interruptions at night frequently for many years.  The severity of sleep apnea is related to how frequent and severe the events are.   Apneas are any events where there is no breathing.  Hypopneas are any events where there is shallow breathing. Sleep studies will track and then add all of the apneas and hypopneas into a total and then divided this number by the total time asleep to obtain the apnea hypopnea index(AHI). 0-5 events/per hour = No Sleep Apnea; 5-15 events/per hour = Mild Sleep Apnea; 15-30 events/per hour = Moderate Sleep Apnea; Greater than 30 events/per hour = Severe Sleep Apnea.  Sleep apnea is typically worse during REM sleep due to complete muscle relaxation, including the muscles of the airway. Sleep apnea is also typically worse " during supine(sleeping on your back) sleep due to internal structures more easily falling on the top of the airway and external pressures more easily crushing the airway.  The respiratory disturbance index(RDI) includes apneas, hypopneas, and other respiratory events such as snoring that may disturb your sleep.  2. What are the consequences of JAIME? Symptoms include: feeling sleepy during the day, snoring loudly, gasping or stopping of breathing, trouble sleeping, and occasionally morning headaches or heartburn at night.  Sleepiness can be serious and even increase the risk of falling asleep while driving. Other health consequences may include development of high blood pressure and other cardiovascular disease in persons who are susceptible. Untreated JAIME  can contribute to heart disease, stroke and diabetes.   3. What are the treatment options? In most situations, sleep apnea is a lifelong disease that must be managed with daily therapy. Medications are not effective for sleep apnea and surgery is generally not considered until other therapies have been tried. Your treatment is your choice . Continuous Positive Airway (CPAP) works right away and is the therapy that is effective in nearly everyone. An oral device to hold your jaw forward is usually the next most reliable option. Other options include postioning devices (to keep you off your back), weight loss, and surgery including a tongue pacing device. There is more detail about some of these options below.    Important tips for using CPAP and similar devices   Know your equipment:  CPAP is continuous positive airway pressure that prevents obstructive sleep apnea by keeping the throat from collapsing while you are sleeping. In most cases, the device is  smart  and can slowly self-adjusts if your throat collapses and keeps a record every day of how well you are treated-this information is available to you and your care team.  BPAP is bilevel positive airway  pressure that keeps your throat open and also assists each breath with a pressure boost to maintain adequate breathing.  Special kinds of BPAP are used in patients who have inadequate breathing from lung or heart disease. In most cases, the device is  smart  and can slowly self-adjusts to assist breathing. Like CPAP, the device keeps a record of how well you are treated.  Your mask is your connection to the device. You get to choose what feels most comfortable and the staff will help to make sure if fits. Here: are some examples of the different masks that are available:       Key points to remember on your journey with sleep apnea:  Sleep study.  PAP devices often need to be adjusted during a sleep study to show that they are effective and adjusted right.  Good tips to remember: Try wearing just the mask during a quiet time during the day so your body adapts to wearing it. A humidifier is recommended for comfort in most cases to prevent drying of your nose and throat. Allergy medication from your provider may help you if you are having nasal congestion.  Getting settled-in. It takes more than one night for most of us to get used to wearing a mask. Try wearing just the mask during a quiet time during the day so your body adapts to wearing it. A humidifier is recommended for comfort in most cases. Our team will work with you carefully on the first day and will be in contact within 4 days and again at 2 and 4 weeks for advice and remote device adjustments. Your therapy is evaluated by the device each day.   Use it every night. The more you are able to sleep naturally for 7-8 hours, the more likely you will have good sleep and to prevent health risks or symptoms from sleep apnea. Even if you use it 4 hours it helps. Occasionally all of us are unable to use a medical therapy, in sleep apnea, it is not dangerous to miss one night.   Communicate. Call our skilled team on the number provided on the first day if your visit  for problems that make it difficult to wear the device. Over 2 out of 3 patients can learn to wear the device long-term with help from our team. Remember to call our team or your sleep providers if you are unable to wear the device as we may have other solutions for those who cannot adapt to mask CPAP therapy. It is recommended that you sleep your sleep provider within the first 3 months and yearly after that if you are not having problems.   Take care of your equipment. Make sure you clean your mask and tubing using directions every day and that your filter and mask are replaced as recommended or if they are not working.     BESIDES CPAP, WHAT OTHER THERAPIES ARE THERE?    Positioning Device  Positioning devices are generally used when sleep apnea is mild and only occurs on your back.This example shows a pillow that straps around the waist. It may be appropriate for those whose sleep study shows milder sleep apnea that occurs primarily when lying flat on one's back. Preliminary studies have shown benefit but effectiveness at home may need to be verified by a home sleep test. These devices are generally not covered by medical insurance.    Oral Appliance  What is oral appliance therapy?  An oral appliance device fits on your teeth at night like a retainer used after having braces. The device is made by a specialized dentist and requires several visits over 1-2 months before a manufactured device is made to fit your teeth and is adjusted to prevent your sleep apnea. Once an oral device is working properly, snoring should be improved. A home sleep test may be recommended at that time if to determine whether the sleep apnea is adequately treated.       Some things to remember:  -Oral devices are often, but not always, covered by your medical insurance. Be sure to check with your insurance provider.   -If you are referred for oral therapy, you will be given a list of specialized dentists to consider or you may choose to  visit the Web site of the American Academy of Dental Sleep Medicine  -Oral devices are less likely to work if you have severe sleep apnea or are extremely overweight.     More detailed information  An oral appliance is a small acrylic device that fits over the upper and lower teeth  (similar to a retainer or a mouth guard). This device slightly moves jaw forward, which moves the base of the tongue forward, opens the airway, improves breathing for effective treat snoring and obstructive sleep apnea in perhaps 7 out of 10 people .  The best working devices are custom-made by a dental device  after a mold is made of the teeth 1, 2, 3.  When is an oral appliance indicated?  Oral appliance therapy is recommended as a first-line treatment for patients with primary snoring, mild sleep apnea, and for patients with moderate sleep apnea who prefer appliance therapy to use of CPAP4, 5. Severity of sleep apnea is determined by sleep testing and is based on the number of respiratory events per hour of sleep.   How successful is oral appliance therapy?  The success rate of oral appliance therapy in patients with mild sleep apnea is 75-80% while in patients with moderate sleep apnea it is 50-70%. The chance of success in patients with severe sleep apnea is 40-50%. The research also shows that oral appliances have a beneficial effect on the cardiovascular health of JAIME patients at the same magnitude as CPAP therapy7.  Oral appliances should be a second-line treatment in cases of severe sleep apnea, but if not completely successful then a combination therapy utilizing CPAP plus oral appliance therapy may be effective. Oral appliances tend to be effective in a broad range of patients although studies show that the patients who have the highest success are females, younger patients, those with milder disease, and less severe obesity. 3, 6.   Finding a dentist that practices dental sleep medicine  Specific training is  available through the American Academy of Dental Sleep Medicine for dentists interested in working in the field of sleep. To find a dentist who is educated in the field of sleep and the use of oral appliances, near you, visit the Web site of the American Academy of Dental Sleep Medicine.    References  1. Efra et al. Objectively measured vs self-reported compliance during oral appliance therapy for sleep-disordered breathing. Chest 2013; 144(5): 8798-9645.  2. Reymundo, et al. Objective measurement of compliance during oral appliance therapy for sleep-disordered breathing. Thorax 2013; 68(1): 91-96.  3. Bethany et al. Mandibular advancement devices in 620 men and women with JAIME and snoring: tolerability and predictors of treatment success. Chest 2004; 125: 8986-4088.  4. Shanique et al. Oral appliances for snoring and JAIME: a review. Sleep 2006; 29: 244-262.  5. Malik, et al. Oral appliance treatment for JAIME: an update. J Clin Sleep Med 2014; 10(2): 215-227.  6. Nakul et al. Predictors of OSAH treatment outcome. J Dent Res 2007; 86: 8536-8765.      Weight Loss:    Weight loss is a long-term strategy that may improve sleep apnea in some patients.    Weight management is a personal decision.  If you are interested in exploring weight loss strategies, the following discussion covers the impact on weight loss on sleep apnea and the approaches that may be successful.    Weight loss decreases severity of sleep apnea in most people with obesity. For those with mild obesity who have developed snoring with weight gain, even 15-30 pound weight loss can improve and occasionally eliminate sleep apnea.  Structured and life-long dietary and health habits are necessary to lose weight and keep healthier weight levels.     Though there may be significant health benefits from weight loss, long-term weight loss is very difficult to achieve- studies show success with dietary management in less than 10% of people. In  addition, substantial weight loss may require years of dietary control and may be difficult if patients have severe obesity. In these cases, surgical management may be considered.  Finally, older individuals who have tolerated obesity without health complications may be less likely to benefit from weight loss strategies.    Your BMI is Body mass index is 22.62 kg/m .     Surgery:    Surgery for obstructive sleep apnea is considered generally only when other therapies fail to work. Surgery may be discussed with you if you are having a difficult time tolerating CPAP and or when there is an abnormal structure that requires surgical correction.  Nose and throat surgeries often enlarge the airway to prevent collapse.  Most of these surgeries create pain for 1-2 weeks and up to half of the most common surgeries are not effective throughout life.  You should carefully discuss the benefits and drawbacks to surgery with your sleep provider and surgeon to determine if it is the best solution for you.   More information  Surgery for JAIME is directed at areas that are responsible for narrowing or complete obstruction of the airway during sleep.  There are a wide range of procedures available to enlarge and/or stabilize the airway to prevent blockage of breathing in the three major areas where it can occur: the palate, tongue, and nasal regions.  Successful surgical treatment depends on the accurate identification of the factors responsible for obstructive sleep apnea in each person.  A personalized approach is required because there is no single treatment that works well for everyone.  Because of anatomic variation, consultation with an examination by a sleep surgeon is a critical first step in determining what surgical options are best for each patient.  In some cases, examination during sedation may be recommended in order to guide the selection of procedures.  Patients will be counseled about risks and benefits as well as the  typical recovery course after surgery. Surgery is typically not a cure for a person s JAIME.  However, surgery will often significantly improve one s JAIME severity (termed  success rate ).  Even in the absence of a cure, surgery will decrease the cardiovascular risk associated with OSA7; improve overall quality of life8 (sleepiness, functionality, sleep quality, etc).      Palate Procedures:  Patients with JAIME often have narrowing of their airway in the region of their tonsils and uvula.  The goals of palate procedures are to widen the airway in this region as well as to help the tissues resist collapse.  Modern palate procedure techniques focus on tissue conservation and soft tissue rearrangement, rather than tissue removal.  Often the uvula is preserved in this procedure. Residual sleep apnea is common in patient after pharyngoplasty with an average reduction in sleep apnea events of 33%2.      Tongue Procedures:  ExamWhile patients are awake, the muscles that surround the throat are active and keep this region open for breathing. These muscles relax during sleep, allowing the tongue and other structures to collapse and block breathing.  There are several different tongue procedures available.  Selection of a tongue base procedure depends on characteristics seen on physical exam.  Generally, procedures are aimed at removing bulky tissues in this area or preventing the back of the tongue from falling back during sleep.  Success rates for tongue surgery range from 50-62%3.    Hypoglossal Nerve Stimulation:  Hypoglossal nerve stimulation has recently received approval from the [place] Food and Drug Administration for the treatment of obstructive sleep apnea.  This is based on research showing that the system was safe and effective in treating sleep apnea6.  Results showed that the median AHI score decreased 68%, from 29.3 to 9.0. This therapy uses an implant system that senses breathing patterns and delivers mild  stimulation to airway muscles, which keeps the airway open during sleep.  The system consists of three fully implanted components: a small generator (similar in size to a pacemaker), a breathing sensor, and a stimulation lead.  Using a small handheld remote, a patient turns the therapy on before bed and off upon awakening.    Candidates for this device must be greater than 22 years of age, have moderate to severe JAIME (AHI between 20-65), BMI less than 32, have tried CPAP/oral appliance without success, and have appropriate upper airway anatomy (determined by a sleep endoscopy performed by Dr. Camargo).    Hypoglossal Nerve Stimulation Pathway:    The sleep surgeon s office will work with the patient through the insurance prior-authorization process (including communications and appeals).    Nasal Procedures:  Nasal obstruction can interfere with nasal breathing during the day and night.  Studies have shown that relief of nasal obstruction can improve the ability of some patients to tolerate positive airway pressure therapy for obstructive sleep apnea1.  Treatment options include medications such as nasal saline, topical corticosteroid and antihistamine sprays, and oral medications such as antihistamines or decongestants. Non-surgical treatments can include external nasal dilators for selected patients. If these are not successful by themselves, surgery can improve the nasal airway either alone or in combination with these other options.      Combination Procedures:  Combination of surgical procedures and other treatments may be recommended, particularly if patients have more than one area of narrowing or persistent positional disease.  The success rate of combination surgery ranges from 66-80%2,3.    References  Julito LOVLEACE. The Role of the Nose in Snoring and Obstructive Sleep Apnoea: An Update.  Eur Arch Otorhinolaryngol. 2011; 268: 1365-73.   Ethel SM; Karli JA; Monique JR; Pallanch JF; Conchita BOND; Michael SG;  Maryann CHAVEZ. Surgical modifications of the upper airway for obstructive sleep apnea in adults: a systematic review and meta-analysis. SLEEP 2010;33(10):7388-8803. Sunny ARMIJO. Hypopharyngeal surgery in obstructive sleep apnea: an evidence-based medicine review.  Arch Otolaryngol Head Neck Surg. 2006 Feb;132(2):206-13.  Young YH1, Wendy Y, Terry ALIYAH. The efficacy of anatomically based multilevel surgery for obstructive sleep apnea. Otolaryngol Head Neck Surg. 2003 Oct;129(4):327-35.  Kezirian E, Goldberg A. Hypopharyngeal Surgery in Obstructive Sleep Apnea: An Evidence-Based Medicine Review. Arch Otolaryngol Head Neck Surg. 2006 Feb;132(2):206-13.  Tracy HOWELL et al. Upper-Airway Stimulation for Obstructive Sleep Apnea.  N Engl J Med. 2014 Jan 9;370(2):139-49.  Khadijah Y et al. Increased Incidence of Cardiovascular Disease in Middle-aged Men with Obstructive Sleep Apnea. Am J Respir Crit Care Med; 2002 166: 159-165  White EM et al. Studying Life Effects and Effectiveness of Palatopharyngoplasty (SLEEP) study: Subjective Outcomes of Isolated Uvulopalatopharyngoplasty. Otolaryngol Head Neck Surg. 2011; 144: 623-631

## 2023-12-03 ASSESSMENT — ENCOUNTER SYMPTOMS
JOINT SWELLING: 0
FEVER: 0
SORE THROAT: 0
MYALGIAS: 0
NERVOUS/ANXIOUS: 0
NAUSEA: 0
ABDOMINAL PAIN: 0
SHORTNESS OF BREATH: 0
COUGH: 0
EYE PAIN: 0
DYSURIA: 0
WEAKNESS: 0
HEMATURIA: 0
PARESTHESIAS: 0
DIZZINESS: 1
CHILLS: 0
HEARTBURN: 0
HEMATOCHEZIA: 0
ARTHRALGIAS: 1
PALPITATIONS: 0
FREQUENCY: 0
DIARRHEA: 0
BREAST MASS: 0
CONSTIPATION: 0
HEADACHES: 0

## 2023-12-03 ASSESSMENT — ACTIVITIES OF DAILY LIVING (ADL): CURRENT_FUNCTION: NO ASSISTANCE NEEDED

## 2023-12-04 ENCOUNTER — HOSPITAL ENCOUNTER (OUTPATIENT)
Dept: MAMMOGRAPHY | Facility: CLINIC | Age: 68
Discharge: HOME | End: 2023-12-04
Attending: FAMILY MEDICINE | Admitting: FAMILY MEDICINE
Payer: COMMERCIAL

## 2023-12-04 DIAGNOSIS — Z12.31 VISIT FOR SCREENING MAMMOGRAM: ICD-10-CM

## 2023-12-04 PROCEDURE — 77067 SCR MAMMO BI INCL CAD: CPT

## 2023-12-07 ENCOUNTER — OFFICE VISIT (OUTPATIENT)
Dept: FAMILY MEDICINE | Facility: CLINIC | Age: 68
End: 2023-12-07
Payer: COMMERCIAL

## 2023-12-07 VITALS
OXYGEN SATURATION: 96 % | WEIGHT: 138.5 LBS | BODY MASS INDEX: 23.07 KG/M2 | SYSTOLIC BLOOD PRESSURE: 108 MMHG | HEART RATE: 70 BPM | HEIGHT: 65 IN | DIASTOLIC BLOOD PRESSURE: 72 MMHG

## 2023-12-07 DIAGNOSIS — R09.81 NASAL CONGESTION: ICD-10-CM

## 2023-12-07 DIAGNOSIS — R41.3 MEMORY CHANGES: ICD-10-CM

## 2023-12-07 DIAGNOSIS — N95.9 MENOPAUSAL AND POSTMENOPAUSAL DISORDER: ICD-10-CM

## 2023-12-07 DIAGNOSIS — Z00.00 ENCOUNTER FOR MEDICARE ANNUAL WELLNESS EXAM: Primary | ICD-10-CM

## 2023-12-07 LAB
CHOLEST SERPL-MCNC: 231 MG/DL
FASTING STATUS PATIENT QL REPORTED: ABNORMAL
FASTING STATUS PATIENT QL REPORTED: NORMAL
GLUCOSE SERPL-MCNC: 83 MG/DL (ref 70–99)
HDLC SERPL-MCNC: 66 MG/DL
HGB BLD-MCNC: 14.1 G/DL (ref 11.7–15.7)
LDLC SERPL CALC-MCNC: 147 MG/DL
NONHDLC SERPL-MCNC: 165 MG/DL
TRIGL SERPL-MCNC: 91 MG/DL

## 2023-12-07 PROCEDURE — G0439 PPPS, SUBSEQ VISIT: HCPCS | Performed by: FAMILY MEDICINE

## 2023-12-07 PROCEDURE — 80061 LIPID PANEL: CPT | Performed by: FAMILY MEDICINE

## 2023-12-07 PROCEDURE — 99214 OFFICE O/P EST MOD 30 MIN: CPT | Mod: 25 | Performed by: FAMILY MEDICINE

## 2023-12-07 PROCEDURE — 36415 COLL VENOUS BLD VENIPUNCTURE: CPT | Performed by: FAMILY MEDICINE

## 2023-12-07 PROCEDURE — 85018 HEMOGLOBIN: CPT | Mod: GZ | Performed by: FAMILY MEDICINE

## 2023-12-07 PROCEDURE — 82947 ASSAY GLUCOSE BLOOD QUANT: CPT | Performed by: FAMILY MEDICINE

## 2023-12-07 RX ORDER — ESTRADIOL 0.03 MG/D
PATCH TRANSDERMAL
Qty: 12 PATCH | Refills: 3 | Status: SHIPPED | OUTPATIENT
Start: 2023-12-07

## 2023-12-07 RX ORDER — AZELASTINE 1 MG/ML
1 SPRAY, METERED NASAL DAILY PRN
Qty: 30 ML | Refills: 3 | Status: SHIPPED | OUTPATIENT
Start: 2023-12-07 | End: 2024-01-02

## 2023-12-07 RX ORDER — AZELASTINE 1 MG/ML
1 SPRAY, METERED NASAL DAILY PRN
COMMUNITY
Start: 2022-04-06 | End: 2023-12-07

## 2023-12-07 ASSESSMENT — ENCOUNTER SYMPTOMS
DIZZINESS: 1
HEMATOCHEZIA: 0
NAUSEA: 0
FREQUENCY: 0
SHORTNESS OF BREATH: 0
PALPITATIONS: 0
SORE THROAT: 0
JOINT SWELLING: 0
MYALGIAS: 0
NERVOUS/ANXIOUS: 0
ARTHRALGIAS: 1
HEMATURIA: 0
COUGH: 0
PARESTHESIAS: 0
WEAKNESS: 0
EYE PAIN: 0
FEVER: 0
DYSURIA: 0
BREAST MASS: 0
CHILLS: 0
CONSTIPATION: 0
DIARRHEA: 0
HEARTBURN: 0
ABDOMINAL PAIN: 0
HEADACHES: 0

## 2023-12-07 ASSESSMENT — ACTIVITIES OF DAILY LIVING (ADL): CURRENT_FUNCTION: NO ASSISTANCE NEEDED

## 2023-12-07 NOTE — PATIENT INSTRUCTIONS
Patient Education   Personalized Prevention Plan  You are due for the preventive services outlined below.  Your care team is available to assist you in scheduling these services.  If you have already completed any of these items, please share that information with your care team to update in your medical record.  Health Maintenance Due   Topic Date Due     RSV VACCINE (Pregnancy & 60+) (1 - 1-dose 60+ series) Never done

## 2023-12-07 NOTE — PROGRESS NOTES
"SUBJECTIVE:   Maggy is a 68 year old, presenting for the following:  Wellness Visit (fasting)        12/7/2023     8:46 AM   Additional Questions   Roomed by Summer SOLITARIO LPN       Are you in the first 12 months of your Medicare coverage?  No    Healthy Habits:     In general, how would you rate your overall health?  Excellent    Frequency of exercise:  4-5 days/week    Duration of exercise:  30-45 minutes    Do you usually eat at least 4 servings of fruit and vegetables a day, include whole grains    & fiber and avoid regularly eating high fat or \"junk\" foods?  Yes    Taking medications regularly:  Yes    Medication side effects:  None    Ability to successfully perform activities of daily living:  No assistance needed    Home Safety:  No safety concerns identified    Hearing Impairment:  No hearing concerns    In the past 6 months, have you been bothered by leaking of urine?  No    In general, how would you rate your overall mental or emotional health?  Excellent    Additional concerns today:  No    Chief Complaint   Patient presents with    Wellness Visit     fasting       She would like refills on her azelastine nasal spray (previously from allergist)- uses for oral allergy syndrome on occasion-(tomatoes, green peppers, banana in that category)     Doing well on very low dose estradiol patch- she prefers continue this and understands risks for stroke  She had a total hysterectomy with oophorectomy (by pt report) age 42; 25yr ago. That's when she started the hormone replacement for bone density.  Was previously on a 1.0 mg estradiol patch until about 3-4 years ago that they started weaning down the dose  Sleep/brain fog were her main issues; never had very many hot flashes but does impact her sleep/warmth at night when she takes this off      Hx HTN, not requiring meds at this time (ever since jail)       Today after the MiniCog she could recall the 3 words but during the assessment she couldn't recall the 3rd " word    During pickle ball she forgets what the score is but understands if their team is winning    No family hx of early dementia    Today's PHQ-2 Score:       12/6/2023     6:55 PM   PHQ-2 ( 1999 [employer])   Q1: Little interest or pleasure in doing things 0   Q2: Feeling down, depressed or hopeless 0   PHQ-2 Score 0   Q1: Little interest or pleasure in doing things Not at all   Q2: Feeling down, depressed or hopeless Not at all   PHQ-2 Score 0           Have you ever done Advance Care Planning? (For example, a Health Directive, POLST, or a discussion with a medical provider or your loved ones about your wishes): No, advance care planning information given to patient to review.  Patient plans to discuss their wishes with loved ones or provider.         Fall risk  Fallen 2 or more times in the past year?: No  Any fall with injury in the past year?: No    Cognitive Screening   1) Repeat 3 items (Leader, Season, Table)    2) Clock draw: NORMAL  3) 3 item recall: Recalls 2 objects   Results: NORMAL clock, 1-2 items recalled: COGNITIVE IMPAIRMENT LESS LIKELY    Mini-CogTM Copyright S Margot. Licensed by the author for use in [facility]; reprinted with permission (soob@[facility]). All rights reserved.          Reviewed and updated as needed this visit by clinical staff   Tobacco  Allergies  Meds              Reviewed and updated as needed this visit by Provider                 Social History     Tobacco Use    Smoking status: Never    Smokeless tobacco: Never   Substance Use Topics    Alcohol use: Not on file     Comment: 1-2/week         12/3/2023     8:23 PM   Alcohol Use   Prescreen: >3 drinks/day or >7 drinks/week? No          No data to display              Do you have a current opioid prescription? No  Do you use any other controlled substances or medications that are not prescribed by a provider? None    Current providers sharing in care for this patient include:   Patient Care Team:  Abhi  Ciara Marques MD as PCP - General (Family Medicine)  Ciara Moe MD as Assigned PCP    The following health maintenance items are reviewed in Epic and correct as of today:  Health Maintenance   Topic Date Due    RSV VACCINE (Pregnancy & 60+) (1 - 1-dose 60+ series) Never done    MEDICARE ANNUAL WELLNESS VISIT  11/16/2023    ANNUAL REVIEW OF HM ORDERS  06/12/2024    FALL RISK ASSESSMENT  12/07/2024    MAMMO SCREENING  12/04/2025    COLORECTAL CANCER SCREENING  12/08/2026    LIPID  11/16/2027    ADVANCE CARE PLANNING  11/16/2027    DTAP/TDAP/TD IMMUNIZATION (4 - Td or Tdap) 11/13/2030    DEXA  12/12/2037    HEPATITIS C SCREENING  Completed    PHQ-2 (once per calendar year)  Completed    INFLUENZA VACCINE  Completed    Pneumococcal Vaccine: 65+ Years  Completed    ZOSTER IMMUNIZATION  Completed    COVID-19 Vaccine  Completed    IPV IMMUNIZATION  Aged Out    HPV IMMUNIZATION  Aged Out    MENINGITIS IMMUNIZATION  Aged Out    RSV MONOCLONAL ANTIBODY  Aged Out             Review of Systems   Constitutional:  Negative for chills and fever.   HENT:  Negative for congestion, ear pain, hearing loss and sore throat.    Eyes:  Negative for pain and visual disturbance.   Respiratory:  Negative for cough and shortness of breath.    Cardiovascular:  Negative for chest pain, palpitations and peripheral edema.   Gastrointestinal:  Negative for abdominal pain, constipation, diarrhea, heartburn, hematochezia and nausea.   Breasts:  Negative for tenderness, breast mass and discharge.   Genitourinary:  Negative for dysuria, frequency, genital sores, hematuria, pelvic pain, urgency, vaginal bleeding and vaginal discharge.   Musculoskeletal:  Positive for arthralgias. Negative for joint swelling and myalgias.   Skin:  Negative for rash.   Neurological:  Positive for dizziness. Negative for weakness, headaches and paresthesias.   Psychiatric/Behavioral:  Negative for mood changes. The patient is not nervous/anxious.   "      OBJECTIVE:   LMP  (LMP Unknown)  Estimated body mass index is 22.62 kg/m  as calculated from the following:    Height as of 12/1/23: 1.664 m (5' 5.5\").    Weight as of 12/1/23: 62.6 kg (138 lb).  Physical Exam  GENERAL: healthy, alert and no distress  EYES: Eyes grossly normal to inspection, PERRL and conjunctivae and sclerae normal  HENT: ear canals and TM's normal, nose and mouth without ulcers or lesions  NECK: no adenopathy, no asymmetry, masses, or scars and thyroid normal to palpation  RESP: lungs clear to auscultation - no rales, rhonchi or wheezes  CV: regular rate and rhythm, normal S1 S2, no S3 or S4, no murmur, click or rub, no peripheral edema and peripheral pulses strong  ABDOMEN: soft, nontender, no hepatosplenomegaly, no masses and bowel sounds normal  MS: no gross musculoskeletal defects noted, no edema  SKIN: no suspicious lesions or rashes  NEURO: Normal strength and tone, mentation intact and speech normal  PSYCH: mentation appears normal, affect normal/bright    Diagnostic Test Results:  Labs reviewed in Epic    ASSESSMENT / PLAN:   Maggy was seen today for wellness visit.    Diagnoses and all orders for this visit:    Encounter for Medicare annual wellness exam  - Home safety information was reviewed if pertinent for falls prevention: regular checkups with vision and hearing, mindful medication use elva with OTCs, using any assisted walking devices, adequate shoes and feet wear, avoiding loose rugs, safety additions to the home if needed, keeping the body in good shape with regular exercise especially walking, and limiting alcohol intake.  - Social history was reviewed  - Patient is independent with activities of daily living  - We reviewed active symptoms and discussed management  - We reviewed list of healthcare providers for this patient.  - We also reviewed PHQ 9    Encouraged RSV vaccination  -     Glucose; Future  -     Hemoglobin; Future  -     Lipid panel reflex to direct LDL " Fasting; Future    Menopausal and postmenopausal disorder  Reviewed risks/benefits including risk for heart attack/stroke etc. She prefers to stay on this low dose weekly patch for now.   -     estradiol (FEMPATCH) 0.025 MG/24HR weekly patch; APPLY 1 PATCH ONCE A WEEK Strength: 0.025 MG/24HR    Nasal congestion  Food oral allergy syndrome; ok for prn use; refill sent  -     azelastine (ASTELIN) 0.1 % nasal spray; Spray 1 spray into both nostrils daily as needed for allergies    Post-menopausal  Osteopenia, unspecified location  Ca/vitamin D; weight bearing exercise  DEXA will be due 12/2024    Mixed hyperlipidemia  Reviewed last ASCVD risk of 7.8% and without other risk factors and age nearly 68 ok to continue to monitor    Memory changes  Baseline mild cognitive changes; she would like baseline assessment given mildly abnormal MiniCog today   -     Adult Neuropsychology  Referral; Future    Other orders  -     PRIMARY CARE FOLLOW-UP SCHEDULING; Future        Patient has been advised of split billing requirements and indicates understanding: Yes      COUNSELING:  Reviewed preventive health counseling, as reflected in patient instructions        She reports that she has never smoked. She has never used smokeless tobacco.      Appropriate preventive services were discussed with this patient, including applicable screening as appropriate for fall prevention, nutrition, physical activity, Tobacco-use cessation, weight loss and cognition.  Checklist reviewing preventive services available has been given to the patient.    Reviewed patients plan of care and provided an AVS. The Basic Care Plan (routine screening as documented in Health Maintenance) for Suzanne meets the Care Plan requirement. This Care Plan has been established and reviewed with the Patient.        Ciara Moe MD  [facility]    Identified Health Risks:  I have reviewed Opioid Use Disorder and Substance  Use Disorder risk factors and made any needed referrals.

## 2023-12-09 NOTE — PROGRESS NOTES
DISCHARGE  Reason for Discharge: Patient has met all goals.    Equipment Issued: none    Discharge Plan: Patient to return to PCP if symptoms recur    Referring Provider:  Kaity Kohli PA-C       10/24/23 0500   Appointment Info   Treating Provider Lili Flores OTR/L   Visits Used 1   Medical Diagnosis dizziness   OT Tx Diagnosis vertigo, unsteadiness   Precautions/Limitations none   Progress Note/Certification   Start Of Care Date 10/24/23   Onset of Illness/Injury or Date of Surgery 06/09/23   Therapy Frequency once a week   Predicted Duration 12 weeks   Certification date from 10/24/23   Certification date to 01/16/24   KX Modifier Statement I certify the need for these services furnished under this plan of treatment and while under my care.  (Physician co-signature of this document indicates review and certification of the therapy plan)   Progress Note Due Date 12/23/23   Progress Note Completed Date 10/24/23   OT Goal 1   Goal Description Patient will have no dizziness or vertigo with head motion   Target Date 01/16/24   Neuromuscular Re-education   Neuromuscular Re-ed Minutes (10586) 10   Neuro Re-ed 1 HEP   Neuro Re-ed 1 - Details Patient instructed on vestibular adaptation and substitution exercises today including horizontal X1 viewing-5 seconds, horizontal targets-15 seconds and NSEC. Patient instructed to perform 4 times a day and decrease to maintenance of once a day if needed once symptoms completely resolve.   Skilled Intervention vestibular adaptation and substitution exercises   Eval/Assessments   OT Eval, Low Complexity Minutes (86111) 25   Education   Learner/Method Patient   Plan   Plan for next session Patient will return if symptoms recur.   Total Session Time   Timed Code Treatment Minutes 10   Total Treatment Time (sum of timed and untimed services) 35

## 2023-12-27 ENCOUNTER — E-VISIT (OUTPATIENT)
Dept: FAMILY MEDICINE | Facility: CLINIC | Age: 68
End: 2023-12-27
Payer: COMMERCIAL

## 2023-12-27 DIAGNOSIS — M43.6 NECK STIFFNESS: Primary | ICD-10-CM

## 2023-12-27 DIAGNOSIS — J06.9 UPPER RESPIRATORY TRACT INFECTION, UNSPECIFIED TYPE: ICD-10-CM

## 2023-12-27 PROCEDURE — 99207 PR NON-BILLABLE SERV PER CHARTING: CPT | Performed by: FAMILY MEDICINE

## 2023-12-27 NOTE — PATIENT INSTRUCTIONS
Dear Suzanne Malik,    We are sorry you are not feeling well. Based on the responses you provided, it is recommended that you be seen in-person in urgent care so we can better evaluate your symptoms. Please click here to find the nearest urgent care location to you.   You will not be charged for this Visit. Thank you for trusting us with your care.    Ciara Moe MD

## 2024-01-02 ENCOUNTER — TELEPHONE (OUTPATIENT)
Dept: NEUROPSYCHOLOGY | Facility: CLINIC | Age: 69
End: 2024-01-02
Payer: COMMERCIAL

## 2024-01-02 DIAGNOSIS — R09.81 NASAL CONGESTION: ICD-10-CM

## 2024-01-02 RX ORDER — AZELASTINE HYDROCHLORIDE 137 UG/1
SPRAY, METERED NASAL
Qty: 30 ML | Refills: 2 | Status: SHIPPED | OUTPATIENT
Start: 2024-01-02

## 2024-01-02 NOTE — TELEPHONE ENCOUNTER
Sent Borqshart (1st Attempt) for the patient to call back and schedule the following:    Appointment type: Neuropsychology Eval  Provider: Dr. Mendez    Return date: first available   Specialty phone number: [phone]  Additional appointment(s) needed: N/A  Additonal Notes: reschedule- provider not in clinic      Charline Napier on 1/2/2024 at 1:26 PM      
Brief intervention

## 2024-01-10 ENCOUNTER — TELEPHONE (OUTPATIENT)
Dept: NEUROPSYCHOLOGY | Facility: CLINIC | Age: 69
End: 2024-01-10
Payer: COMMERCIAL

## 2024-01-10 NOTE — TELEPHONE ENCOUNTER
Left Voicemail (2nd Attempt) for the patient to call back and schedule the following:    Appointment type: reschedule - Neuropsychology Eval   Provider: Any neuropsych provider   Return date: first available   Specialty phone number: [phone]  Additional appointment(s) needed: N/A  Additonal Notes: reschedule - clinician not in clinic   LVM x2, sent MyC, and send Letter     Charline Napier on 1/10/2024 at 10:29 AM

## 2024-02-13 ENCOUNTER — OFFICE VISIT (OUTPATIENT)
Dept: FAMILY MEDICINE | Facility: CLINIC | Age: 69
End: 2024-02-13
Payer: COMMERCIAL

## 2024-02-13 VITALS
HEART RATE: 84 BPM | HEIGHT: 65 IN | DIASTOLIC BLOOD PRESSURE: 86 MMHG | WEIGHT: 138.4 LBS | BODY MASS INDEX: 23.06 KG/M2 | OXYGEN SATURATION: 98 % | TEMPERATURE: 97.4 F | SYSTOLIC BLOOD PRESSURE: 130 MMHG | RESPIRATION RATE: 18 BRPM

## 2024-02-13 DIAGNOSIS — J30.89 NON-SEASONAL ALLERGIC RHINITIS, UNSPECIFIED TRIGGER: ICD-10-CM

## 2024-02-13 DIAGNOSIS — R05.3 CHRONIC COUGH: Primary | ICD-10-CM

## 2024-02-13 PROCEDURE — 99213 OFFICE O/P EST LOW 20 MIN: CPT | Performed by: NURSE PRACTITIONER

## 2024-02-13 ASSESSMENT — ENCOUNTER SYMPTOMS: COUGH: 1

## 2024-02-13 NOTE — PROGRESS NOTES
Assessment & Plan     Chronic cough  Discussed could be related to PND from allergies. Recommend she restart fluticasone nasal spray X 2-4 weeks.  If not improving, could consider pepcid or PPI to see if reflux could be causing symptoms.     Recommend fluticasone nasal spray 2 sprays in each nostril daily for 2-4 weeks.  For more severe congestion in the nose, may try Afrin (oxymetazoline) as directed X 2 days.  Do not use longer or it can cause rebound congestion.      Non-seasonal allergic rhinitis, unspecified trigger  See above, recommend restarting fluticasone.                  Subjective   Maggy is a 68 year old, presenting for the following health issues:  Cough (Ongoing cough since December), Throat Problem (Pt. Felt as though there is Lump in throat while swallowing), and Sinus Problem (Pt. Experiencing some sinus pressure)        2/13/2024     3:04 PM   Additional Questions   Roomed by TREASURE Luke     Maggy is a 68-year-old female patient of Dr. Moe were seen for the first time presents today for evaluation of cough.  She started with a cough in December, 12/20/2023.  States she woke up with laryngitis.  Her throat felt swollen-like she had a ball in her throat.  She was visiting her brother and the muscular line at the time.  She had head congestion, neck stiffness.  COVID and flu negative.  She treated with steam and hot showers which seem to help.  They recommended a decongestant, she took Advil D.  Does affect her blood pressure makes her feel jittery.  Seemed to help her nasal congestion with it.  Things seem to calm down but she still has a cough for the last couple months.  Mornings are worse.  She uses a nasal rinse.  She sleeps with her head of bed elevated and this helps.    Presents today because she had worsening symptoms 2 days ago.  Reports head congestion and a lump on the right side of her throat and pressure in her ear.  He started Zyrtec and thinks it is helpful.  Right working out  "today but had a lot of coughing.  Nonproductive cough.  Clear nasal drainage.  She denies fever or chills.    Denies history of asthma or acid reflux.  She has taken fluticasone in the past but not for the last several weeks.    Of note, she did have allergy consultation.  Diagnosed with oral allergy syndrome-has trouble with tomatoes and she did eat some fresh salsa on Sunday.  I did review allergy note.    She also reports low O2 saturations on her Apple Watch.  Did have sleep evaluation but no sleep study completed.  Apple watch gets down to 91%, 89%.           History of Present Illness       Reason for visit:  Persistent cough and congestion since mid-Dec.  Infection or allergies or both?  Symptom onset:  More than a month  Symptoms include:  Cough, congestion, swelling in throat, pressure in right ear  Symptom intensity:  Moderate  Symptom progression:  Worsening  Had these symptoms before:  Yes  Has tried/received treatment for these symptoms:  Yes  Previous treatment was successful:  No    She eats 2-3 servings of fruits and vegetables daily.She consumes 0 sweetened beverage(s) daily.She exercises with enough effort to increase her heart rate 20 to 29 minutes per day.  She exercises with enough effort to increase her heart rate 5 days per week.   She is taking medications regularly.                 Review of Systems   Constitutional:  Negative for chills and fever.   HENT:  Positive for congestion, ear pain, postnasal drip, rhinorrhea and sore throat.    Eyes: Negative.    Respiratory:  Positive for cough. Negative for shortness of breath and wheezing.    Cardiovascular: Negative.    Gastrointestinal: Negative.    Neurological:  Negative for dizziness.             Objective    /86 (BP Location: Right arm, Patient Position: Sitting, Cuff Size: Adult Regular)   Pulse 84   Temp 97.4  F (36.3  C) (Tympanic)   Resp 18   Ht 1.659 m (5' 5.32\")   Wt 62.8 kg (138 lb 6.4 oz)   LMP  (LMP Unknown)   SpO2 " 98%   BMI 22.81 kg/m    Body mass index is 22.81 kg/m .  Physical Exam  Constitutional:       Appearance: Normal appearance. She is not ill-appearing.   HENT:      Head: Normocephalic and atraumatic.      Right Ear: Tympanic membrane and ear canal normal.      Left Ear: Tympanic membrane and ear canal normal.      Nose: Congestion and rhinorrhea present.      Mouth/Throat:      Mouth: Mucous membranes are moist.      Pharynx: No oropharyngeal exudate or posterior oropharyngeal erythema.   Eyes:      General:         Right eye: No discharge.         Left eye: No discharge.      Conjunctiva/sclera: Conjunctivae normal.      Pupils: Pupils are equal, round, and reactive to light.   Cardiovascular:      Rate and Rhythm: Normal rate and regular rhythm.   Pulmonary:      Effort: Pulmonary effort is normal.      Breath sounds: Normal breath sounds.   Lymphadenopathy:      Cervical: Cervical adenopathy present.   Skin:     General: Skin is warm and dry.      Capillary Refill: Capillary refill takes less than 2 seconds.   Neurological:      Mental Status: She is alert and oriented to person, place, and time.   Psychiatric:         Mood and Affect: Mood normal.                    Signed Electronically by: VALENTIN Mcgill CNP

## 2024-02-13 NOTE — PATIENT INSTRUCTIONS
Recommend fluticasone nasal spray 2 sprays in each nostril daily for 2-4 weeks.  For more severe congestion in the nose, may try Afrin (oxymetazoline) as directed X 2 days.  Do not use longer or it can cause rebound congestion.

## 2024-02-14 ASSESSMENT — ENCOUNTER SYMPTOMS
FEVER: 0
CARDIOVASCULAR NEGATIVE: 1
SORE THROAT: 1
CHILLS: 0
DIZZINESS: 0
GASTROINTESTINAL NEGATIVE: 1
EYES NEGATIVE: 1
RHINORRHEA: 1
WHEEZING: 0
SHORTNESS OF BREATH: 0

## 2024-02-22 ENCOUNTER — TRANSFERRED RECORDS (OUTPATIENT)
Dept: HEALTH INFORMATION MANAGEMENT | Facility: CLINIC | Age: 69
End: 2024-02-22
Payer: COMMERCIAL

## 2024-05-21 ENCOUNTER — THERAPY VISIT (OUTPATIENT)
Dept: OCCUPATIONAL THERAPY | Facility: REHABILITATION | Age: 69
End: 2024-05-21
Payer: COMMERCIAL

## 2024-05-21 DIAGNOSIS — R26.81 UNSTEADINESS: ICD-10-CM

## 2024-05-21 DIAGNOSIS — R42 DIZZINESS: Primary | ICD-10-CM

## 2024-05-21 PROCEDURE — 97165 OT EVAL LOW COMPLEX 30 MIN: CPT | Mod: GO | Performed by: OCCUPATIONAL THERAPIST

## 2024-05-21 PROCEDURE — 97112 NEUROMUSCULAR REEDUCATION: CPT | Mod: GO | Performed by: OCCUPATIONAL THERAPIST

## 2024-05-21 NOTE — PROGRESS NOTES
"OCCUPATIONAL THERAPY EVALUATION  Type of Visit: Evaluation    See electronic medical record for Abuse and Falls Screening details.    Presenting condition or subjective complaint: Patient has long history of dizziness with first episode about 30 years ago and lasted about 3 months. Patient had no symptoms for about 10 years and then had another similar episode. Patient was then symptom free until July of 2023 when she had onset of vertigo and imbalance. Saw ENT and was referred to OT for vestibular rehab. Patient seen for one visit in October and was instructed on oculomotor and gaze stabilization exercises with complete resolution of symptoms. Patient reports another recurrence of symptoms however she does not have true vertigo this time. Symptoms consist of dizziness with quick head motions and disequilibrium. Patient denies tinnitus, ear pain, hearing changes or headaches. Patient also reports that she \"sees stars when I turn my head to look where I'm putting Deoderant on\"  Patient also states that her oxygen is in the mid-90's at times. O2 sats were 97 on clinic device today.    Date of onset: 05/14/24    Relevant medical history:     Past Medical History:   Diagnosis Date    Allergy, unspecified not elsewhere classified     Basal cell cancer 2014    Breast cyst 2008    Left breast    Breast cyst 2011    Right breast    Diffuse cystic mastopathy     Essential hypertension, benign     Intestinal disaccharidase deficiencies and disaccharide malabsorption     Migraine, unspecified, without mention of intractable migraine without mention of status migrainosus     Unspecified menopausal and postmenopausal disorder      Dates & types of surgery: Appendectomy 1963  Hysterectomy 1998    Prior diagnostic imaging/testing results:      See EMR  Prior therapy history for the same diagnosis, illness or injury: Yes balance therapy    Prior Level of Function  Transfers: Independent  Ambulation: Independent  ADL: " Independent  IADL: Independent    Living Environment  Social support: Alone   Type of home: [facility]   Stairs to enter the home: No       Ramp: No   Stairs inside the home: Yes 14 Is there a railing: Yes   Help at home: None  Equipment owned:       Employment:      Hobbies/Interests:      Patient goals for therapy: not feel like I am going to fall    Pain assessment: Pain denied     Objective     VISUAL SKILLS  Visual Acuity: Wears glasses, blurry vision on occasion and will be seeing eye doctor to check status of cataracts  Visual Field: Appears normal  Visual Attention: Appears normal    SENSATION: UE Sensation WNL, LE Sensation WNL    FUNCTIONAL MOBILITY  Assistive Device(s): None  Ambulation: Independent    ACTIVITY TOLERANCE: Excellent    BASIC and INSTRUMENTAL ACTIVITIES OF DAILY LIVING (IADL): Patient is independent with BADL's and IADL's but moves a little slower due to dizziness      VESTIBULAR EVALUATION  ADDITIONAL HISTORY:  Description of symptoms: Off balance  Dizzy attacks:   Start:     Last attack:     Frequency of occurrences:     Length of attack:    Difficulty hearing:    Noise in ears? No    Alleviates symptoms:    Worsens symptoms: standing and quick turns  Activities that bring on symptoms: Reaching up; Turning while walking       DHI: Total Score: 12    Oculomotor Screen    Ocular ROM Normal   Smooth Pursuit Normal and mild dizziness   Saccades Normal        Infrared Goggle Exam Vestibular Suppressant in Last 24 Hours? No  Exam Completed With: Room light   Spontaneous Nystagmus NT   Gaze Evoked Nystagmus Negative   Head Shake Horizontal Nystagmus dizziness    Left Right   [place]-Hallpike Negative Negative   HSCC Supine Roll Test Negative Negative   HSCC Forward Roll Test Negative Negative     Assessment & Plan   CLINICAL IMPRESSIONS  Medical Diagnosis: Dizziness    Treatment Diagnosis: dizziness, imbalance    Impression/Assessment: Pt is a 69 year old female presenting to Occupational Therapy due  to onset of dizziness and unsteadiness.  The following significant findings have been identified: Impaired balance and dizziness .  These identified deficits interfere with their ability to perform self care tasks and household chores as compared to previous level of function.  Patient will make appt with PCP if HEP does not resolve current dizziness symptoms.    Clinical Decision Making (Complexity):  Assessment of Occupational Performance: 1-3 Performance Deficits  Occupational Performance Limitations: functional mobility  Clinical Decision Making (Complexity): Low complexity    PLAN OF CARE  Treatment Interventions:  Interventions: Neuromuscular Re-education    Long Term Goals   OT Goal 1  Goal Description: Patient will be able to turn head for conversation without dizziness  Target Date: 08/13/24      Frequency of Treatment: once a week  Duration of Treatment: 12 weeks     Recommended Referrals to Other Professionals: no  Education Assessment: Learner/Method: Patient     Risks and benefits of evaluation/treatment have been explained.   Patient agrees with Plan of Care.     Evaluation Time:    OT Eval, Low Complexity Minutes (50394): 30       Signing Clinician: Rhona Flores OT      [facility]                                                                                   OUTPATIENT OCCUPATIONAL THERAPY      PLAN OF TREATMENT FOR OUTPATIENT REHABILITATION   Patient's Last Name, First Name, Suzanne Anthony YOB: 1955   Provider's Name   [facility]   Medical Record No.  [id number]     Onset Date: 05/14/24 Start of Care Date: 05/21/24     Medical Diagnosis:  Dizziness      OT Treatment Diagnosis:  dizziness, imbalance Plan of Treatment  Frequency/Duration:once a week/12 weeks    Certification date from 05/21/24   To 08/13/24        See note for plan of treatment details and functional goals     Rhona Flores OT                          I CERTIFY THE NEED FOR THESE SERVICES FURNISHED UNDER        THIS PLAN OF TREATMENT AND WHILE UNDER MY CARE     (Physician attestation of this document indicates review and certification of the therapy plan).              Referring Provider:  Ciara Moe MD    Initial Assessment  See Epic Evaluation- 05/21/24

## 2024-06-09 DIAGNOSIS — R09.81 NASAL CONGESTION: ICD-10-CM

## 2024-06-10 RX ORDER — AZELASTINE HYDROCHLORIDE 137 UG/1
SPRAY, METERED NASAL
OUTPATIENT
Start: 2024-06-10

## 2024-07-03 NOTE — PROGRESS NOTES
DISCHARGE  Reason for Discharge: Patient has failed to schedule further appointments.    Equipment Issued: none    Discharge Plan: Patient to continue home program.    Referring Provider:  Referred Self       05/21/24 0500   Appointment Info   Treating Provider Lili Flores OTR/L   Visits Used 1   Medical Diagnosis Dizziness   OT Tx Diagnosis dizziness, imbalance   Progress Note/Certification   Start Of Care Date 05/21/24   Onset of Illness/Injury or Date of Surgery 05/14/24   Therapy Frequency once a week   Predicted Duration 12 weeks   Certification date from 05/21/24   Certification date to 08/13/24   KX Modifier Statement I certify the need for these services furnished under this plan of treatment and while under my care.  (Physician co-signature of this document indicates review and certification of the therapy plan)   Progress Note Due Date 08/13/24   Progress Note Completed Date 05/21/24   OT Goal 1   Goal Description Patient will be able to turn head for conversation without dizziness   Target Date 08/13/24   Subjective Report   Subjective Report Patient hoping the dizziness resolves quickly   Neuromuscular Re-education   Neuromuscular Re-ed Minutes (49244) 10   Neuro Re-ed 1 HEP   Neuro Re-ed 1 - Details Patient instructed on HEP including horizontal X1 viewing-10 seconds, horizontal targets-10 seconds and NSEC. Patient to perform 3-4 times a day.   Skilled Intervention instruct on HEP to improve dizziness for increased ease of ADL and IADL's   Eval/Assessments   OT Eval, Low Complexity Minutes (02851) 30   Education   Learner/Method Patient   Plan   Plan for next session [place] HEP if needed. Patient reports seeing stars when turning head to look where she is applying deodarant and will discuss this with PCP. Patient education regarding possibility of multifactorial symptoms and recommendation to follow up with PCP.   Total Session Time   Timed Code Treatment Minutes 10   Total Treatment Time (sum  of timed and untimed services) 40

## 2024-07-31 ENCOUNTER — TELEPHONE (OUTPATIENT)
Dept: NEUROPSYCHOLOGY | Facility: CLINIC | Age: 69
End: 2024-07-31
Payer: COMMERCIAL

## 2024-07-31 NOTE — TELEPHONE ENCOUNTER
Left Voicemail (1st Attempt) and Sent Mychart (1st Attempt) for the patient to call back and reschedule the following:    Reschedule:  9/18 appointment   Appointment type: Neuropsychology Eval or Neuropsych Interview  Provider: Neuropsychology general provider    Return date: First Available   Specialty phone number: [phone]  Additional appointment(s) needed: n/a  Additonal Notes: WQ reschedule - provider not in clinic      Charline Napier on 7/31/2024 at 10:55 AM

## 2024-08-02 ENCOUNTER — TELEPHONE (OUTPATIENT)
Dept: NEUROPSYCHOLOGY | Facility: CLINIC | Age: 69
End: 2024-08-02
Payer: COMMERCIAL

## 2024-08-02 NOTE — TELEPHONE ENCOUNTER
LVM for patient to call back and reschedule Neuropsych Evaluation with Dr. Mendez to the following:    Dr. Ham  Neuropsych Eval  Sept 16 2024 8AM  [facility] [place]    This is a TREVER slot and OK to take per Dr. Mendez message - please schedule patient and send confirmation TE to clinical pool when patient is rescheduled.    If patient cannot make this visit offering work, OK to schedule with any provider any opening after she will be recovered from her surgery    Evelina Wilde on 8/2/2024 at 3:48 PM

## 2024-09-11 ENCOUNTER — OFFICE VISIT (OUTPATIENT)
Dept: FAMILY MEDICINE | Facility: CLINIC | Age: 69
End: 2024-09-11
Payer: COMMERCIAL

## 2024-09-11 VITALS
HEIGHT: 65 IN | HEART RATE: 76 BPM | SYSTOLIC BLOOD PRESSURE: 134 MMHG | BODY MASS INDEX: 22.82 KG/M2 | RESPIRATION RATE: 14 BRPM | WEIGHT: 137 LBS | OXYGEN SATURATION: 97 % | DIASTOLIC BLOOD PRESSURE: 86 MMHG

## 2024-09-11 DIAGNOSIS — E55.9 VITAMIN D DEFICIENCY, UNSPECIFIED: ICD-10-CM

## 2024-09-11 DIAGNOSIS — H25.9 SENILE CATARACT, UNSPECIFIED AGE-RELATED CATARACT TYPE, UNSPECIFIED LATERALITY: ICD-10-CM

## 2024-09-11 DIAGNOSIS — R41.3 MEMORY CHANGES: ICD-10-CM

## 2024-09-11 DIAGNOSIS — I10 ESSENTIAL HYPERTENSION, BENIGN: ICD-10-CM

## 2024-09-11 DIAGNOSIS — Z01.818 PRE-OP EXAM: Primary | ICD-10-CM

## 2024-09-11 PROBLEM — R09.02 HYPOXIA: Status: RESOLVED | Noted: 2023-12-01 | Resolved: 2024-09-11

## 2024-09-11 LAB
TSH SERPL DL<=0.005 MIU/L-ACNC: 1.56 UIU/ML (ref 0.3–4.2)
VIT B12 SERPL-MCNC: 786 PG/ML (ref 232–1245)
VIT D+METAB SERPL-MCNC: 42 NG/ML (ref 20–50)

## 2024-09-11 PROCEDURE — 36415 COLL VENOUS BLD VENIPUNCTURE: CPT | Performed by: FAMILY MEDICINE

## 2024-09-11 PROCEDURE — 82607 VITAMIN B-12: CPT | Performed by: FAMILY MEDICINE

## 2024-09-11 PROCEDURE — 99214 OFFICE O/P EST MOD 30 MIN: CPT | Performed by: FAMILY MEDICINE

## 2024-09-11 PROCEDURE — 99000 SPECIMEN HANDLING OFFICE-LAB: CPT | Performed by: FAMILY MEDICINE

## 2024-09-11 PROCEDURE — 84207 ASSAY OF VITAMIN B-6: CPT | Mod: 90 | Performed by: FAMILY MEDICINE

## 2024-09-11 PROCEDURE — 84443 ASSAY THYROID STIM HORMONE: CPT | Performed by: FAMILY MEDICINE

## 2024-09-11 PROCEDURE — 82306 VITAMIN D 25 HYDROXY: CPT | Performed by: FAMILY MEDICINE

## 2024-09-11 RX ORDER — FAMOTIDINE 10 MG
10 TABLET ORAL
COMMUNITY

## 2024-09-11 RX ORDER — VITAMIN B COMPLEX
1 TABLET ORAL 2 TIMES DAILY
COMMUNITY

## 2024-09-11 RX ORDER — MECLIZINE HYDROCHLORIDE 25 MG/1
25 TABLET ORAL 3 TIMES DAILY PRN
COMMUNITY

## 2024-09-11 RX ORDER — IBUPROFEN 200 MG
200 TABLET ORAL EVERY 4 HOURS PRN
COMMUNITY

## 2024-09-11 RX ORDER — CETIRIZINE HYDROCHLORIDE 5 MG/1
5 TABLET ORAL DAILY
COMMUNITY

## 2024-09-11 RX ORDER — LANOLIN ALCOHOL/MO/W.PET/CERES
3 CREAM (GRAM) TOPICAL
COMMUNITY

## 2024-09-11 NOTE — PROGRESS NOTES
Preoperative Evaluation  [facility]  [address] [address]  Phone: [phone]  Fax: [phone]  Primary Provider: Caira Moe MD  Pre-op Performing Provider: Ciara Moe MD  Sep 11, 2024         9/9/2024   Surgical Information   What procedure is being done? Cataract surgery  both eyes   Facility or Hospital where procedure/surgery will be performed: [facility]   Who is doing the procedure / surgery? Dr Greenberg   Date of surgery / procedure: 9/23/24 and 10/7/24   Time of surgery / procedure: don't know yet   Where do you plan to recover after surgery? at home with family        Fax number for surgical facility: [facility]    Assessment & Plan     The proposed surgical procedure is considered LOW risk.    Pre-op exam  Senile cataract, unspecified age-related cataract type, unspecified laterality  Medically clear to proceed    Essential hypertension, benign  Diet controlled    Memory changes  Baseline mild cognitive changes likely age related; has her neurocog Baseline consult on Monday (it was canceled on her several times and ordered back in 12/2023 when mildly abnormal minicog reported). Will collect a few labs today. NO famhx Dementia.   - Vitamin D deficiency screening; Future  - Vitamin B12; Future  - Vitamin B6; Future  - TSH with free T4 reflex; Future    Vitamin D deficiency, unspecified  - Vitamin D deficiency screening; Future        - No identified additional risk factors other than previously addressed         Recommendation  Approval given to proceed with proposed procedure, without further diagnostic evaluation.    Alize Winter is a 69 year old, presenting for the following:  Pre-Op Exam (09/23/2024 and 10/07/2024, Fasting. [facility].)          9/11/2024     9:41 AM   Additional Questions   Roomed by Briana PATEL MA     HPI related to upcoming procedure: cataract surgery      Looking forward to better nighttime vision for driving and improving reading ability          9/9/2024   Pre-Op Questionnaire   Have you ever had a heart attack or stroke? No   Have you ever had surgery on your heart or blood vessels, such as a stent placement, a coronary artery bypass, or surgery on an artery in your head, neck, heart, or legs? No   Do you have chest pain with activity? No   Do you have a history of heart failure? No   Do you currently have a cold, bronchitis or symptoms of other infection? No   Do you have a cough, shortness of breath, or wheezing? No   Do you or anyone in your family have previous history of blood clots? (!) YES mom had blood clot age 70 and similar for dad- sedentary lifestyle    No prior personal hx of clotting     Do you or does anyone in your family have a serious bleeding problem such as prolonged bleeding following surgeries or cuts? No   Have you ever had problems with anemia or been told to take iron pills? No   Have you had any abnormal blood loss such as black, tarry or bloody stools, or abnormal vaginal bleeding? No   Have you ever had a blood transfusion? No   Are you willing to have a blood transfusion if it is medically needed before, during, or after your surgery? Yes   Have you or any of your relatives ever had problems with anesthesia? No   Do you have sleep apnea, excessive snoring or daytime drowsiness? No   Do you have any artifical heart valves or other implanted medical devices like a pacemaker, defibrillator, or continuous glucose monitor? No   Do you have artificial joints? No   Are you allergic to latex? No        Health Care Directive  Patient does not have a Health Care Directive or Living Will: Patient states has Advance Directive and will bring in a copy to clinic.    Preoperative Review of    reviewed - no record of controlled substances prescribed.        Patient Active Problem List    Diagnosis Date Noted    Hypoxemia associated with  sleep 12/01/2023     Priority: Medium    Disorder of bone and cartilage 11/16/2022     Priority: Medium     Formatting of this note might be different from the original.  Created by Conversion    Replacement Utility updated for latest IMO load      Mixed hyperlipidemia 07/19/2021     Priority: Medium     Formatting of this note might be different from the original.  Created by Conversion      Osteoarthrosis 07/19/2021     Priority: Medium     Formatting of this note might be different from the original.  Created by Conversion    Replacement Utility updated for latest IMO load      Essential hypertension, benign 11/07/2007     Priority: Medium    Menopausal and postmenopausal disorder      Priority: Medium     Problem list name updated by automated process. Provider to review      Allergic state      Priority: Medium     Problem list name updated by automated process. Provider to review      Migraine      Priority: Medium     Problem list name updated by automated process. Provider to review        Past Medical History:   Diagnosis Date    Allergy, unspecified not elsewhere classified     Basal cell cancer 2014    Breast cyst 2008    Left breast    Breast cyst 2011    Right breast    Diffuse cystic mastopathy     Essential hypertension, benign     Intestinal disaccharidase deficiencies and disaccharide malabsorption     Migraine, unspecified, without mention of intractable migraine without mention of status migrainosus     Unspecified menopausal and postmenopausal disorder      Past Surgical History:   Procedure Laterality Date    BASAL CELL CARCINOMA EXCISION  2014    left shoulder    BREAST CYST ASPIRATION Left 2008    BREAST CYST ASPIRATION Right 2011    HC REMOVAL OF TONSILS,<11 Y/O      Description: Tonsillectomy;  Proc Date: 01/01/1957;    HC REMOVE TONSILS/ADENOIDS,<11 Y/O      HYSTERECTOMY      OOPHORECTOMY      [facility] APPENDECTOMY  8 years old    [facility] APPENDECTOMY      Description: Appendectomy;  Proc Date:  "01/01/1963;    [facility] TOTAL ABDOM HYSTERECTOMY  1998    for adenomyosis    [facility] VAG HYST,RMV TUBE/OVARY  01/1998     Current Outpatient Medications   Medication Sig Dispense Refill    Azelastine HCl 137 MCG/SPRAY SOLN SPRAY 1 SPRAY INTO BOTH NOSTRILS DAILY AS NEEDED FOR ALLERGIES 30 mL 2    Bioflavonoid Products (IRENE VITAMIN C-FLAVONOIDS PO)       Calcium Citrate 333 MG TABS       cetirizine (ZYRTEC) 5 MG tablet Take 5 mg by mouth daily.      estradiol (FEMPATCH) 0.025 MG/24HR weekly patch APPLY 1 PATCH ONCE A WEEK Strength: 0.025 MG/24HR 12 patch 3    famotidine (PEPCID) 10 MG tablet Take 10 mg by mouth nightly as needed.      fluticasone furoate 27.5 MCG/SPRAY nasal spray [place] 1 spray into both nostrils daily.      ibuprofen (ADVIL/MOTRIN) 200 MG tablet Take 200 mg by mouth every 4 hours as needed.      Magnesium Citrate 200 MG TABS       meclizine (DRAMAMINE LESS DROWSY) 25 MG tablet Take 25 mg by mouth 3 times daily as needed.      melatonin 3 MG tablet Take 3 mg by mouth nightly as needed.      PATADAY 0.2 % OP SOLN Apply to eye as needed       Probiotic Product (DIGESTIVE ADVANTAGE PO)       Probiotic Product (PROBIOTIC PO) Take by mouth daily Low histamine probiotic      Vitamin D3 (D3-1000) 25 mcg (1000 units) tablet Take 1 tablet by mouth 2 times daily.         Allergies   Allergen Reactions    Ragweeds      Food oral allergy syndrome        Social History     Tobacco Use    Smoking status: Never     Passive exposure: Never    Smokeless tobacco: Never   Substance Use Topics    Alcohol use: Not on file     Comment: 1-2/week     History   Drug Use No             Objective    LMP  (LMP Unknown)    Estimated body mass index is 22.81 kg/m  as calculated from the following:    Height as of 2/13/24: 1.659 m (5' 5.32\").    Weight as of 2/13/24: 62.8 kg (138 lb 6.4 oz).    /86 (BP Location: Left arm, Patient Position: Sitting, Cuff Size: Adult Small)   Pulse 76   Resp 14   Ht 1.659 m (5' 5.32\")   Wt 62.1 " kg (137 lb)   LMP  (LMP Unknown)   SpO2 97%   BMI 22.58 kg/m    GENERAL: no apparent distress  EYES: Conjunctiva are not injected, no discharge.  EARS: Left TM -no erythema, no effusion,  not bulged.               Right TM -no erythema, no effusion,  not bulged.  NOSE: no discharge, no sinus tenderness  THROAT: no erythema, no exudate, no lesions  NECK: supple, no adenopathy.  CARDIAC: regular rate and rhythm, no murmur  RESP: clear, no wheezing, no rales, no rhonchi  ABD: soft, no distension, no tenderness  SKIN: No rashes

## 2024-09-14 LAB — PYRIDOXAL PHOS SERPL-SCNC: 111.7 NMOL/L

## 2024-09-16 ENCOUNTER — OFFICE VISIT (OUTPATIENT)
Dept: NEUROPSYCHOLOGY | Facility: CLINIC | Age: 69
End: 2024-09-16
Attending: FAMILY MEDICINE
Payer: COMMERCIAL

## 2024-09-16 DIAGNOSIS — F06.8 OTHER SPECIFIED MENTAL DISORDERS DUE TO KNOWN PHYSIOLOGICAL CONDITION: Primary | ICD-10-CM

## 2024-09-16 DIAGNOSIS — R41.3 MEMORY CHANGES: ICD-10-CM

## 2024-09-16 PROCEDURE — 96132 NRPSYC TST EVAL PHYS/QHP 1ST: CPT | Performed by: CLINICAL NEUROPSYCHOLOGIST

## 2024-09-16 PROCEDURE — 90791 PSYCH DIAGNOSTIC EVALUATION: CPT | Performed by: CLINICAL NEUROPSYCHOLOGIST

## 2024-09-16 PROCEDURE — 96133 NRPSYC TST EVAL PHYS/QHP EA: CPT | Performed by: CLINICAL NEUROPSYCHOLOGIST

## 2024-09-16 NOTE — LETTER
9/16/2024       RE: Suzanne Malik  [address] [place] [address]     Dear Colleague,    Thank you for referring your patient, Suzanne Malik, to the [facility]UR[facility]SYCHOLOG[facility] [place] at [facility]. Please see a copy of my visit note below.    Name: Suzanne Malik  MR#: [id number]  YOB: 1955  Date of Exam: 9/16/2024     NEUROPSYCHOLOGICAL EVALUATION     IDENTIFYING INFORMATION  Suzanne Malik is a 69-year-old, right-handed, retired , with 18 years of formal education. She was unaccompanied to the interview.      The patient was in-clinic for the entirety of this evaluation. The interview and testing were completed face-to-face.      BACKGROUND INFORMATION / INTERVIEW FINDINGS    Records indicate that Ms. Suzanne Malik has a history of hypoxemia associated with sleep, mixed hyperlipidemia, hypertension, migraine, disorder of bone and cartilage, osteoarthrosis, menopausal and postmenopausal disorder, allergic state, diffuse cystic mastopathy, and basal cell carcinoma s/p excision (2014). Per records, she presented to her primary care provider on 12/7/2023 with concerns for forgetfulness and brain fog. Specifically, she reported being unable to remember the score when playing pickleball. A brief cognitive screening measure was normal at that time (Mini-Cog = 4/5). Recent labs for TSH and vitamin B12 were normal. The current evaluation was requested by her primary care provider, Dr. Ciara Moe, in this context.      On interview, Ms. Malik confirmed the above history. She reported a gradual onset of memory difficulties approximately 1.5 years ago. At that time, she noted that her Apple watch reported that her oxygen level was low during sleep, and she worried that this was contributing to her cognitive difficulties. She reportedly had a sleep consult, and the medical provider indicated  no concerns with her level of oxygen saturation. She added that she underwent a total hysterectomy at age 42 and has been on replacement hormones since that time. She noted that her prescribing provider has been reducing the dosage of her replacement hormones over the past 5 years. She wondered whether changes in her hormones may explain her brain fog. In general, she indicated that her cognition appeared to be improving over time, which she attributed to using the BrainPresenter[employer] lucinda. However, she noted more difficulty with cognition in the past week prior to the current evaluation.    Regarding specific cognitive changes, Ms. Malik described forgetting her score when playing pickleball, which she noted worsens when she is feeling tired. She indicated that she also forgets the names of acquaintances. She reported that it takes her longer to remember what she did the day prior when she is journaling in the morning. She expressed that she recently learned how to play cribbage and sometimes has difficulty following the rules. She stated that she was counting money with a friend at [employer] and that she miscounted the change. She also reported that she recently used an incorrect acronym when writing out a check. She endorsed occasional word-finding difficulties. She described feeling more overwhelmed with things in her life, which impacts her ability to plan efficiently. She denied forgetting conversations or repeating herself. She reported no jori personality changes.     With respect to mental health, Ms. Malik described her current mood as  okay.  She noted that she sometimes feels down and depressed as well as anxious. Specifically, she described feeling anxious and stressed about her parents who are planning to move into an independent living facility soon. She denied a history of mental health diagnoses or psychiatric hospitalization. She has worked with a psychotherapist three times in the past, most recently 12-13  years ago. She has never worked with a psychiatrist. She reported experiencing suicidal ideation approximately 50 years ago after a break-up, but she denied a plan or intention to act on those thoughts at that time. She reported no history of suicide attempt. She denied current suicidal ideation, plans, or intentions. She reported no history of trauma, abuse, auditory/visual hallucinations, paranoia, or delusions.    Regarding other medical background, she reported tightness in her left hip which sometimes impacts her gait. She also described a history of intermittent episodes of vertigo related to a remote history of damage to her right ear. Her most recent vertigo episode was 4 months ago. She reported that her vertigo resolved after she practiced exercises that she previously learned in physical therapy. She indicated one recent fall when she tripped while looking at her phone. She denied hitting her head. She reported no history of stroke, seizure, head injury with loss of consciousness, tremor, numbness/tingling, unilateral weakness, headaches/migraines, or chronic pain. She indicated that she is scheduled to undergo bilateral cataract surgery next week. She reported difficulty with vision in low lighting. She denied changes in her hearing or in her sense of taste or smell.      She reported sleeping 5.5 hours per night on average, which is a longstanding pattern for her. She indicated that her sleep was disrupted the night prior to the current evaluation when she awoke at 2:00 AM. However, she stated that she was able to fall back asleep. She reported problems with sleep maintenance, especially when she drinks caffeine in the late afternoon. She uses melatonin nightly. She denied a history of snoring or witnessed apneas. However, her friends have reportedly told her that she seems congested when she sleeps. She has never undergone a formal sleep study or been diagnosed with sleep apnea. She reported an  isolated incident in the past when she sat up yelling because she was dreaming that someone was stealing her purse. She denied other dream enactment behaviors. She described feeling tired upon waking in the morning. She denied daytime naps. She reported no significant changes in her appetite or weight. She meets with a  via Zoom once per week and does weight/strength training. She practices the exercises on her own throughout the week. Additionally, she enjoys paddleboarding and walking. She drinks 1-2 cocktails once per week, at most. She denied a history of problematic alcohol use. She reported no current or historical use of tobacco, cannabis, or illicit substances. She also denied a history of chemical dependency treatment. She drinks approximately 2 cups of half-caffeinated coffee per day. She sometimes drinks caffeinated beverages in the afternoon. Per records, her current medications include azelastine, bioflavonoid products, calcium citrate, cetirizine, estradiol, famotidine, fluticasone furoate, ibuprofen, magnesium citrate, meclizine, melatonin, Pataday, probiotic product, and vitamin D3.     Records indicate a known family medical history of stroke (maternal grandmother, maternal grandfather). She denied a known family history of dementia, Parkinson's disease, or multiple sclerosis. Her parents are in their early 90s.     Ms. Malik lives alone. She manages her own personal cares. She denied difficulties with meal preparation. As a precaution, she stated that she always turns on the oven light when cooking and then turns the light off when she has finished cooking and has turned off the stove. She denied problems with managing her finances or medications. She continues to drive with no reported tickets/accidents or becoming lost in familiar places. She reported some difficulty with driving at night due to her cataracts.      By way of background, she was previously  and .  She has no children. She reported good social support from her brother, niece, and several close friends. She was born and raised in [place], [place]. She reported that she was a breech baby but denied other complications with her birth. To her knowledge, she reportedly met her developmental milestones on time. She described herself as an excellent student. She denied a history of learning difficulties, attention/behavioral problems, special education, or repeating a grade. She earned her bachelor's degree in chemistry from [employer] and her master's degree in engineering administration from \[employer]\"". 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 She previously worked as a  and chemistry . She currently volunteers with a number of nonprofits, including [employer] and [employer] [facility]. She helped develop the [employer] initiative through the [employer] [facility] nonprofit and provides ongoing support with organizing data for the program. She also works a part-time job doing online research for a nonprofit .     BEHAVIORAL OBSERVATIONS  Ms. Malik arrived early to her appointment and was unaccompanied. She was pleasant and cooperative with the exam. She was appropriately dressed and groomed. She wore glasses throughout the evaluation. Hearing appeared adequate for testing purposes. Gait was observed to be normal. No tremor or postural abnormalities were observed. Speech was normal for fluency, rate, volume, and prosody. Comprehension appeared adequate. Thought processes were linear and goal-directed. Her mood was mildly anxious with congruent affect. She appeared to be a good personal historian. During testing, she was alert and attentive. She occasionally required repetition of task instructions. Her effort appeared good and consistent throughout the evaluation. The current results are felt to be an accurate  depiction of her cognitive functioning.     RESULTS OF EXAM  Her performances on standardized measures of neuropsychological functioning were as follows.    She was fully oriented to personal information, place, and time. She correctly named the current [place] president and 4 of the 5 most recent past [place] presidents. She obtained passing scores on stand-alone and embedded metrics of cognitive performance validity. Performance on a measure of single word reading was average. Auditory attention for digit was average. Mental calculations were high average. Learning of words in a list format was high average. Delayed recall of list words was average. She retained 91% of the list words that she initially learned. Delayed recognition of list words was average with 1 false-positive error. Learning of simple geometric figures and their spatial locations was average. Delayed recall of the figures was high average. She retained 100% of the figure details that she initially learned. Recognition of these figures was perfect. Nonverbal reasoning through pattern completion was above average. Visual spatial judgments for variably oriented lines were within expectation. Her drawing of a complex geometric figure was within normal limits. Her approach to copying the figure was well-organized. Comprehension of phrases and short stories was below expectation. Verbal associative fluency was average with no errors. Animal fluency was low average with 1 repetition. Naming to confrontation was performed in the average range, and she benefitted from phonemic cues. Verbal abstract reasoning was high average. Speeded visual sequencing under focused attention was performed in the average range and was error-free. A similar measure with a divided attention component was performed in the high average range with no errors. Speeded word reading was in the average to low average range, while color naming was average. Speeded inhibition of an overlearned  response was in the average to low average range. Speeded visual motor coding was high average. Speeded fine motor dexterity was average, bilaterally.        She endorsed items consistent with minimal symptoms of depression on a self-report measure.      IMPRESSIONS  Ms. Malik demonstrated mild weaknesses and variability that raise the question of subtle dysfunction of subcortical systems. She does not meet criteria for a neurocognitive diagnosis (e.g., dementia or mild cognitive impairments). Speculatively, given her medical history, these findings could be explained by cerebrovascular changes. However, factors such as anxiety, hypoxemia during sleep, and normal variability could be contributing. On evaluation, Ms. Malik demonstrated mild variability in processing speed and verbal fluency. Her processing speed abilities ranged from high average to low average, while verbal fluency abilities ranged from average to low average. There is also some variability in executive functioning. Nonverbal reasoning was a personal strength. The remainder of her cognitive abilities were normal and performed in keeping with her generally high average range cognitive baseline. She reported minimal symptoms of depression.     RECOMMENDATIONS  Preliminary results and recommendations were provided to Ms. Malik via telephone on 9/16/2024, and all her questions were answered.      A referral for brain imaging (e.g., MRI) may be considered to quantify atrophy or vascular burden and to rule out an emerging cerebrovascular process.    Ms. Malik reported a history of possible hypoxemia during sleep on today's evaluation. She completed a sleep clinic consult on 12/1/2023 but did not undergo a formal sleep study. If medically indicated, she may consider a second opinion to rule out an underlying sleep disorder.      Ms. Malik is encouraged to sustain a physically active lifestyle, as able and in consultation with her medical providers.  Regular exercise at a mild to moderate level is the best lifestyle tool we have for maintaining cognitive health as we age.     She should continue management of her chronic vascular conditions (e.g., hypertension, hyperlipidemia) through regular follow-up with her medical providers.      If she continues to have difficulties with memory, she may benefit from routine use of a memory notebook or other assistive device. Additionally, she may incorporate mindfulness techniques into her daily routine by paying mindful attention to and making a conscious effort to learn new information.     A neuropsychological re-evaluation is recommended in approximately 1 year to assess for cognitive changes and to update recommendations, as needed. The current results can be used as a baseline at that time.     Shiela Coffey, Ph.D.  Neuropsychology Fellow      Madi Ham, Ph.D., L.P., [facility]  Board Certified in Clinical Neuropsychology   / Licensed Psychologist [id number]    All billing noted here is for professional services provided by the psychologist and psychometrist.     Time spent: One unit psychiatric diagnostic interview including interview and clinical assessment by licensed and board-certified neuropsychologist (CPT 79422). One unit (60 minutes) neuropsychological testing evaluation by licensed and board-certified neuropsychologist, including integration of patient data, interpretation of standardized test results and clinical data, clinical decision-making, treatment planning, and report, first hour (CPT 94739). One unit(s) (45 minutes) of neuropsychological testing evaluation by licensed and board-certified neuropsychologist, including integration of patient data, interpretation of standardized test results and clinical data, clinical decision-making, treatment planning, supervision of fellow, and report, subsequent hours (CPT 47608). 238 minutes of testing and scoring by a graduate practicum student, 0  units billed. Diagnoses: R41.3, F06.8.    Pt was seen for neuropsychological evaluation at the request of Dr. Pablo for the purposes of diagnostic clarification and treatment planning. 238 minutes of test administration and scoring were provided by Marly Armstrong. Please see complete report for a full interpretation of the findings.    Marly Armstrong MS  Doctoral Practicum Student  [facility] Neuropsychology      Again, thank you for allowing me to participate in the care of your patient.      Sincerely,    Madi Ham, PhD LP

## 2024-09-17 NOTE — PROGRESS NOTES
Pt was seen for neuropsychological evaluation at the request of Dr. Pablo for the purposes of diagnostic clarification and treatment planning. 238 minutes of test administration and scoring were provided by Marly Armstrong. Please see complete report for a full interpretation of the findings.    Marly Armstrong MS  Doctoral Practicum Student  [facility] Neuropsychology

## 2024-09-17 NOTE — PROGRESS NOTES
Name: Suzanne Malik  MR#: [id number]  YOB: 1955  Date of Exam: 9/16/2024     NEUROPSYCHOLOGICAL EVALUATION     IDENTIFYING INFORMATION  Suzanne Malik is a 69-year-old, right-handed, retired , with 18 years of formal education. She was unaccompanied to the interview.      The patient was in-clinic for the entirety of this evaluation. The interview and testing were completed face-to-face.      BACKGROUND INFORMATION / INTERVIEW FINDINGS    Records indicate that Ms. Suzanne Malik has a history of hypoxemia associated with sleep, mixed hyperlipidemia, hypertension, migraine, disorder of bone and cartilage, osteoarthrosis, menopausal and postmenopausal disorder, allergic state, diffuse cystic mastopathy, and basal cell carcinoma s/p excision (2014). Per records, she presented to her primary care provider on 12/7/2023 with concerns for forgetfulness and brain fog. Specifically, she reported being unable to remember the score when playing pickleball. A brief cognitive screening measure was normal at that time (Mini-Cog = 4/5). Recent labs for TSH and vitamin B12 were normal. The current evaluation was requested by her primary care provider, Dr. Ciara Moe, in this context.      On interview, Ms. Malik confirmed the above history. She reported a gradual onset of memory difficulties approximately 1.5 years ago. At that time, she noted that her Apple watch reported that her oxygen level was low during sleep, and she worried that this was contributing to her cognitive difficulties. She reportedly had a sleep consult, and the medical provider indicated no concerns with her level of oxygen saturation. She added that she underwent a total hysterectomy at age 42 and has been on replacement hormones since that time. She noted that her prescribing provider has been reducing the dosage of her replacement hormones over the past 5 years. She wondered whether changes in her  hormones may explain her brain fog. In general, she indicated that her cognition appeared to be improving over time, which she attributed to using the BrainInogenQ lucinda. However, she noted more difficulty with cognition in the past week prior to the current evaluation.    Regarding specific cognitive changes, Ms. Malik described forgetting her score when playing pickleball, which she noted worsens when she is feeling tired. She indicated that she also forgets the names of acquaintances. She reported that it takes her longer to remember what she did the day prior when she is journaling in the morning. She expressed that she recently learned how to play cribbage and sometimes has difficulty following the rules. She stated that she was counting money with a friend at [employer] and that she miscounted the change. She also reported that she recently used an incorrect acronym when writing out a check. She endorsed occasional word-finding difficulties. She described feeling more overwhelmed with things in her life, which impacts her ability to plan efficiently. She denied forgetting conversations or repeating herself. She reported no jori personality changes.     With respect to mental health, Ms. Malik described her current mood as  okay.  She noted that she sometimes feels down and depressed as well as anxious. Specifically, she described feeling anxious and stressed about her parents who are planning to move into an independent living facility soon. She denied a history of mental health diagnoses or psychiatric hospitalization. She has worked with a psychotherapist three times in the past, most recently 12-13 years ago. She has never worked with a psychiatrist. She reported experiencing suicidal ideation approximately 50 years ago after a break-up, but she denied a plan or intention to act on those thoughts at that time. She reported no history of suicide attempt. She denied current suicidal ideation, plans, or intentions.  She reported no history of trauma, abuse, auditory/visual hallucinations, paranoia, or delusions.    Regarding other medical background, she reported tightness in her left hip which sometimes impacts her gait. She also described a history of intermittent episodes of vertigo related to a remote history of damage to her right ear. Her most recent vertigo episode was 4 months ago. She reported that her vertigo resolved after she practiced exercises that she previously learned in physical therapy. She indicated one recent fall when she tripped while looking at her phone. She denied hitting her head. She reported no history of stroke, seizure, head injury with loss of consciousness, tremor, numbness/tingling, unilateral weakness, headaches/migraines, or chronic pain. She indicated that she is scheduled to undergo bilateral cataract surgery next week. She reported difficulty with vision in low lighting. She denied changes in her hearing or in her sense of taste or smell.      She reported sleeping 5.5 hours per night on average, which is a longstanding pattern for her. She indicated that her sleep was disrupted the night prior to the current evaluation when she awoke at 2:00 AM. However, she stated that she was able to fall back asleep. She reported problems with sleep maintenance, especially when she drinks caffeine in the late afternoon. She uses melatonin nightly. She denied a history of snoring or witnessed apneas. However, her friends have reportedly told her that she seems congested when she sleeps. She has never undergone a formal sleep study or been diagnosed with sleep apnea. She reported an isolated incident in the past when she sat up yelling because she was dreaming that someone was stealing her purse. She denied other dream enactment behaviors. She described feeling tired upon waking in the morning. She denied daytime naps. She reported no significant changes in her appetite or weight. She meets with a   via [employer] once per week and does weight/strength training. She practices the exercises on her own throughout the week. Additionally, she enjoys paddleboarding and walking. She drinks 1-2 cocktails once per week, at most. She denied a history of problematic alcohol use. She reported no current or historical use of tobacco, cannabis, or illicit substances. She also denied a history of chemical dependency treatment. She drinks approximately 2 cups of half-caffeinated coffee per day. She sometimes drinks caffeinated beverages in the afternoon. Per records, her current medications include azelastine, bioflavonoid products, calcium citrate, cetirizine, estradiol, famotidine, fluticasone furoate, ibuprofen, magnesium citrate, meclizine, melatonin, Pataday, probiotic product, and vitamin D3.     Records indicate a known family medical history of stroke (maternal grandmother, maternal grandfather). She denied a known family history of dementia, Parkinson's disease, or multiple sclerosis. Her parents are in their early 90s.     Ms. Malik lives alone. She manages her own personal cares. She denied difficulties with meal preparation. As a precaution, she stated that she always turns on the oven light when cooking and then turns the light off when she has finished cooking and has turned off the stove. She denied problems with managing her finances or medications. She continues to drive with no reported tickets/accidents or becoming lost in familiar places. She reported some difficulty with driving at night due to her cataracts.      By way of background, she was previously  and . She has no children. She reported good social support from her brother, niece, and several close friends. She was born and raised in [place], [place]. She reported that she was a breech baby but denied other complications with her birth. To her knowledge, she reportedly met her developmental milestones on time. She  described herself as an excellent student. She denied a history of learning difficulties, attention/behavioral problems, special education, or repeating a grade. She earned her bachelor's degree in chemistry from [employer] and her master's degree in engineering administration from [employer]. 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 She previously worked as a  and chemistry . 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 She helped develop the [employer] initiative through the [employer] [facility] nonprofit and provides ongoing support with organizing data for the program. She also works a part-time job doing online research for a nonprofit .     BEHAVIORAL OBSERVATIONS  Ms. Malik arrived early to her appointment and was unaccompanied. She was pleasant and cooperative with the exam. She was appropriately dressed and groomed. She wore glasses throughout the evaluation. Hearing appeared adequate for testing purposes. Gait was observed to be normal. No tremor or postural abnormalities were observed. Speech was normal for fluency, rate, volume, and prosody. Comprehension appeared adequate. Thought processes were linear and goal-directed. Her mood was mildly anxious with congruent affect. She appeared to be a good personal historian. During testing, she was alert and attentive. She occasionally required repetition of task instructions. Her effort appeared good and consistent throughout the evaluation. The current results are felt to be an accurate depiction of her cognitive functioning.     RESULTS OF EXAM  Her performances on standardized measures of neuropsychological functioning were as follows.    She was fully oriented to personal information, place, and time. She correctly named the current [place] president and 4 of the 5 most recent past [place] presidents. She  obtained passing scores on stand-alone and embedded metrics of cognitive performance validity. Performance on a measure of single word reading was average. Auditory attention for digit was average. Mental calculations were high average. Learning of words in a list format was high average. Delayed recall of list words was average. She retained 91% of the list words that she initially learned. Delayed recognition of list words was average with 1 false-positive error. Learning of simple geometric figures and their spatial locations was average. Delayed recall of the figures was high average. She retained 100% of the figure details that she initially learned. Recognition of these figures was perfect. Nonverbal reasoning through pattern completion was above average. Visual spatial judgments for variably oriented lines were within expectation. Her drawing of a complex geometric figure was within normal limits. Her approach to copying the figure was well-organized. Comprehension of phrases and short stories was below expectation. Verbal associative fluency was average with no errors. Animal fluency was low average with 1 repetition. Naming to confrontation was performed in the average range, and she benefitted from phonemic cues. Verbal abstract reasoning was high average. Speeded visual sequencing under focused attention was performed in the average range and was error-free. A similar measure with a divided attention component was performed in the high average range with no errors. Speeded word reading was in the average to low average range, while color naming was average. Speeded inhibition of an overlearned response was in the average to low average range. Speeded visual motor coding was high average. Speeded fine motor dexterity was average, bilaterally.        She endorsed items consistent with minimal symptoms of depression on a self-report measure.      IMPRESSIONS  Ms. Malik demonstrated mild weaknesses and  variability that raise the question of subtle dysfunction of subcortical systems. She does not meet criteria for a neurocognitive diagnosis (e.g., dementia or mild cognitive impairments). Speculatively, given her medical history, these findings could be explained by cerebrovascular changes. However, factors such as anxiety, hypoxemia during sleep, and normal variability could be contributing. On evaluation, Ms. Malik demonstrated mild variability in processing speed and verbal fluency. Her processing speed abilities ranged from high average to low average, while verbal fluency abilities ranged from average to low average. There is also some variability in executive functioning. Nonverbal reasoning was a personal strength. The remainder of her cognitive abilities were normal and performed in keeping with her generally high average range cognitive baseline. She reported minimal symptoms of depression.     RECOMMENDATIONS  Preliminary results and recommendations were provided to Ms. Malik via telephone on 9/16/2024, and all her questions were answered.      A referral for brain imaging (e.g., MRI) may be considered to quantify atrophy or vascular burden and to rule out an emerging cerebrovascular process.    Ms. Malik reported a history of possible hypoxemia during sleep on today's evaluation. She completed a sleep clinic consult on 12/1/2023 but did not undergo a formal sleep study. If medically indicated, she may consider a second opinion to rule out an underlying sleep disorder.      Ms. Malik is encouraged to sustain a physically active lifestyle, as able and in consultation with her medical providers. Regular exercise at a mild to moderate level is the best lifestyle tool we have for maintaining cognitive health as we age.     She should continue management of her chronic vascular conditions (e.g., hypertension, hyperlipidemia) through regular follow-up with her medical providers.      If she continues to have  difficulties with memory, she may benefit from routine use of a memory notebook or other assistive device. Additionally, she may incorporate mindfulness techniques into her daily routine by paying mindful attention to and making a conscious effort to learn new information.     A neuropsychological re-evaluation is recommended in approximately 1 year to assess for cognitive changes and to update recommendations, as needed. The current results can be used as a baseline at that time.     Shiela Coffey, Ph.D.  Neuropsychology Fellow      Madi Ham, Ph.D., L.P., [facility]  Board Certified in Clinical Neuropsychology   / Licensed Psychologist [id number]    All billing noted here is for professional services provided by the psychologist and psychometrist.     Time spent: One unit psychiatric diagnostic interview including interview and clinical assessment by licensed and board-certified neuropsychologist (CPT 60283). One unit (60 minutes) neuropsychological testing evaluation by licensed and board-certified neuropsychologist, including integration of patient data, interpretation of standardized test results and clinical data, clinical decision-making, treatment planning, and report, first hour (CPT 44344). One unit(s) (45 minutes) of neuropsychological testing evaluation by licensed and board-certified neuropsychologist, including integration of patient data, interpretation of standardized test results and clinical data, clinical decision-making, treatment planning, supervision of fellow, and report, subsequent hours (CPT 45751). 238 minutes of testing and scoring by a graduate practicum student, 0 units billed. Diagnoses: R41.3, F06.8.

## 2024-09-17 NOTE — PROGRESS NOTES
NAME  Maggy Malik    MRN  [id number]      53     AGE  69     SEX  Female     HANDEDNESS Right     EDUCATION 18     DOMINGUEZ  24     PROVIDER  [facility]  [place]     STATION  OP            ORIENTATION      Time  0     Personal Info.     Place      Presidents             WAIS-IV       FSIQ: 0 WMI: 0    VCI: 0 PSI: 0    ARJUN: 0 RDS: 8             Raw ScS    Similarities  29 13    Vocabulary  0 0    Information 0 0    Comprehension 0 0    Block Design 0 0    Matrix Reasoning 21 15    Visual Puzzles 0 0    Picture Comp. 0 0    Figure Weights 0 0    Digit Span  23 9    Arithmetic  16 12    L-N Sequencing 0 0    Symbol Search 0 0    Coding  60 12    Cancellation 0 0           NELY-O COMPLEX FIGURE       Raw T %ile   Copy  34  > 16   Short Delay Recall 0 0 0   Long Delay Recall 0 0 0   Recognition Total 0 0 0   Time to Copy 154  > 16                       WRAT    Blue     SS %ile GE   Reading  102 55 > 12.9   Spelling  0 0 0   Arithmetic  0 0 0   Sentence Comp. 0 0 0          COWAT FAS       Raw 40      ScS 10      T 45             ANIMAL FLUENCY      Raw 16      ScS 8      T 39              BOSTON NAMING TEST     Raw 56 /60     ScS 11      %ile 60-71%             COMPLEX IDEATIONAL MATERIAL     Raw 11      ScS 9      T 36             TRAIL MAKING TEST       Time Errors ScS %ile   A 32 1 11 60-71   B 69 0 12 72-81          STROOP        Raw %ile     Word 81 19-28     Color 67 60-71     C/W 25 19-28             KALI   H   Raw 30      %ile 98             GROOVED PEGBOARD       Raw Drops ScS T   RH 72 0 8 50   LH 87 1 7 47          GDS       Raw 6      Interp. Minimal              HVLT   1     Raw T    Trial 1  4     Trial 2  11     Trial 3  11     Learning  7     Total Recall 28 58    Delayed Recall 10 56    Percent Retention 91% 52    True Positives 12     False Positives 1     Disc. Index  11 53            BVMT   1     Raw T/%ile    Trial 1  3     Trial 2  5     Trial 3  10     Learning  7     Total Recall 18  45    Delayed Recall 10 59    Percent Retention 100% > 16    Recognition Hits 6     Recognition F.P 0     Disc. Index  6 > 16           TOMM       Trial 1 49      Trial 2 50      Trial 3 0

## 2024-11-13 ENCOUNTER — ORDERS ONLY (AUTO-RELEASED) (OUTPATIENT)
Dept: FAMILY MEDICINE | Facility: CLINIC | Age: 69
End: 2024-11-13

## 2024-11-13 ENCOUNTER — E-VISIT (OUTPATIENT)
Dept: FAMILY MEDICINE | Facility: CLINIC | Age: 69
End: 2024-11-13
Payer: COMMERCIAL

## 2024-11-13 DIAGNOSIS — I49.9 IRREGULAR HEART RATE: ICD-10-CM

## 2024-11-13 DIAGNOSIS — I49.9 IRREGULAR HEART RATE: Primary | ICD-10-CM

## 2024-11-13 DIAGNOSIS — N95.9 MENOPAUSAL AND POSTMENOPAUSAL DISORDER: ICD-10-CM

## 2024-11-13 NOTE — PATIENT INSTRUCTIONS
Thank you for choosing us for your care.  From the brief information provided here it looks like you may have been advised by your nurse practitioner to consider a Holter monitor.  I can place an order for that and I will get mailed to your house so they can place it for you.  However I would recommend scheduling a visit with me video visits are available a week or 2 out from now typically and if you need help getting seen for an in person visit please let us know through a [employer] message to the clinic so that they can help schedule you.  Or a phone call to talk with our clinic care team.  It may be helpful to see any available provider to start to workup further.  \  You can schedule an appointment by clicking here in [employer], or call [phone].

## 2024-11-14 RX ORDER — ESTRADIOL 0.03 MG/D
PATCH TRANSDERMAL
Qty: 12 PATCH | Refills: 3 | Status: SHIPPED | OUTPATIENT
Start: 2024-11-14

## 2024-11-18 ENCOUNTER — PATIENT OUTREACH (OUTPATIENT)
Dept: CARE COORDINATION | Facility: CLINIC | Age: 69
End: 2024-11-18
Payer: COMMERCIAL

## 2024-11-18 DIAGNOSIS — Z71.89 COUNSELING AND COORDINATION OF CARE: Primary | ICD-10-CM

## 2024-11-20 ENCOUNTER — PATIENT OUTREACH (OUTPATIENT)
Dept: CARE COORDINATION | Facility: CLINIC | Age: 69
End: 2024-11-20
Payer: COMMERCIAL

## 2024-11-20 NOTE — PROGRESS NOTES
Clinic Care Coordination Contact  Community Health Worker Initial Outreach    Patient accepts CC: CHW briefly spoke with patient. Patient let me know that she wasdriving and will call me back later today.    Overview  RN assessment - [employer] referral : Pt needing to connect with PCP re: EKG and holter monitor   CHW - schedule with Lisa Sheldon [facility] Health Worker  [facility] Care Coordination   [place] [facility], [place], [place], [place] and [facility]  Office: [phone]

## 2024-11-20 NOTE — LETTER
[facility] CARE COORDINATION  [address]  [place] [address]    November 20, 2024    Suzanne Malik  [address]   [place] [address]      Dear Maggy,    I am a clinic community health worker who works with Ciara Moe MD with the [facility]. I wanted to thank you for spending the time to talk with me.  Below is a description of clinic care coordination and how I can further assist you.       The clinic care coordination team is made up of a registered nurse, , financial resource worker and community health worker who understand the health care system. The goal of clinic care coordination is to help you manage your health and improve access to the health care system. Our team works alongside your provider to assist you in determining your health and social needs. We can help you obtain health care and community resources, providing you with necessary information and education. We can work with you through any barriers and develop a care plan that helps coordinate and strengthen the communication between you and your care team.  Our services are voluntary and are offered without charge to you personally.    Please feel free to contact me with any questions or concerns regarding care coordination and what we can offer.      We are focused on providing you with the highest-quality healthcare experience possible.    Sincerely,     Monalisa Ellis  Community Health Worker  [facility] Care Coordination   [place], [place], [place], [place] and [facility]  Office: [phone]

## 2024-11-20 NOTE — PROGRESS NOTES
Clinic Care Coordination Contact  Community Health Worker Initial Outreach    Patient accepts CC: No, patient started wearing Holter Monitor on 11/19/24 and is supposed to wear it for 1 week then send it back in to the company to get the results; that will then be sent to Dr. Moe. Patient has an AWV schedule with Dr. Moe on 12/19/24. Patient let me know that she feels comfortable waiting until her AWV to discuss the Holter Monitor results at that time and will also discuss getting an EKG at that appt also.     Patient let me know that she is not feeling like she is needing any assistance from CCC at this time. Patient will be sent Care Coordination introduction letter for future reference.     Monalisa Ellis  Community Health Worker  [facility] Care Coordination   [place], [place], [place], [place] and [facility]  Office: [phone]

## 2024-12-04 LAB — CV ZIO PRELIM RESULTS: NORMAL

## 2024-12-14 ENCOUNTER — MYC MEDICAL ADVICE (OUTPATIENT)
Dept: FAMILY MEDICINE | Facility: CLINIC | Age: 69
End: 2024-12-14
Payer: COMMERCIAL

## 2024-12-14 DIAGNOSIS — Z00.00 MEDICARE ANNUAL WELLNESS VISIT, SUBSEQUENT: Primary | ICD-10-CM

## 2024-12-14 SDOH — HEALTH STABILITY: PHYSICAL HEALTH: ON AVERAGE, HOW MANY DAYS PER WEEK DO YOU ENGAGE IN MODERATE TO STRENUOUS EXERCISE (LIKE A BRISK WALK)?: 5 DAYS

## 2024-12-14 SDOH — HEALTH STABILITY: PHYSICAL HEALTH: ON AVERAGE, HOW MANY MINUTES DO YOU ENGAGE IN EXERCISE AT THIS LEVEL?: 40 MIN

## 2024-12-14 ASSESSMENT — SOCIAL DETERMINANTS OF HEALTH (SDOH): HOW OFTEN DO YOU GET TOGETHER WITH FRIENDS OR RELATIVES?: THREE TIMES A WEEK

## 2024-12-18 ENCOUNTER — LAB (OUTPATIENT)
Dept: LAB | Facility: CLINIC | Age: 69
End: 2024-12-18
Payer: COMMERCIAL

## 2024-12-18 DIAGNOSIS — Z00.00 MEDICARE ANNUAL WELLNESS VISIT, SUBSEQUENT: ICD-10-CM

## 2024-12-18 LAB
ANION GAP SERPL CALCULATED.3IONS-SCNC: 9 MMOL/L (ref 7–15)
BUN SERPL-MCNC: 13.7 MG/DL (ref 8–23)
CALCIUM SERPL-MCNC: 9.4 MG/DL (ref 8.8–10.4)
CHLORIDE SERPL-SCNC: 105 MMOL/L (ref 98–107)
CHOLEST SERPL-MCNC: 205 MG/DL
CREAT SERPL-MCNC: 0.9 MG/DL (ref 0.51–0.95)
EGFRCR SERPLBLD CKD-EPI 2021: 68 ML/MIN/1.73M2
ERYTHROCYTE [DISTWIDTH] IN BLOOD BY AUTOMATED COUNT: 11.8 % (ref 10–15)
EST. AVERAGE GLUCOSE BLD GHB EST-MCNC: 105 MG/DL
FASTING STATUS PATIENT QL REPORTED: YES
FASTING STATUS PATIENT QL REPORTED: YES
GLUCOSE SERPL-MCNC: 81 MG/DL (ref 70–99)
HBA1C MFR BLD: 5.3 % (ref 0–5.6)
HCO3 SERPL-SCNC: 28 MMOL/L (ref 22–29)
HCT VFR BLD AUTO: 41.6 % (ref 35–47)
HDLC SERPL-MCNC: 64 MG/DL
HGB BLD-MCNC: 13.8 G/DL (ref 11.7–15.7)
LDLC SERPL CALC-MCNC: 119 MG/DL
MCH RBC QN AUTO: 31.2 PG (ref 26.5–33)
MCHC RBC AUTO-ENTMCNC: 33.2 G/DL (ref 31.5–36.5)
MCV RBC AUTO: 94 FL (ref 78–100)
NONHDLC SERPL-MCNC: 141 MG/DL
PLATELET # BLD AUTO: 274 10E3/UL (ref 150–450)
POTASSIUM SERPL-SCNC: 4.1 MMOL/L (ref 3.4–5.3)
RBC # BLD AUTO: 4.42 10E6/UL (ref 3.8–5.2)
SODIUM SERPL-SCNC: 142 MMOL/L (ref 135–145)
TRIGL SERPL-MCNC: 110 MG/DL
WBC # BLD AUTO: 5.5 10E3/UL (ref 4–11)

## 2024-12-18 PROCEDURE — 80048 BASIC METABOLIC PNL TOTAL CA: CPT

## 2024-12-18 PROCEDURE — 36415 COLL VENOUS BLD VENIPUNCTURE: CPT

## 2024-12-18 PROCEDURE — 80061 LIPID PANEL: CPT

## 2024-12-18 PROCEDURE — 83036 HEMOGLOBIN GLYCOSYLATED A1C: CPT | Mod: GZ

## 2024-12-18 PROCEDURE — 85027 COMPLETE CBC AUTOMATED: CPT | Mod: GZ

## 2024-12-19 ENCOUNTER — OFFICE VISIT (OUTPATIENT)
Dept: FAMILY MEDICINE | Facility: CLINIC | Age: 69
End: 2024-12-19
Attending: FAMILY MEDICINE
Payer: COMMERCIAL

## 2024-12-19 VITALS
HEIGHT: 65 IN | OXYGEN SATURATION: 97 % | HEART RATE: 62 BPM | BODY MASS INDEX: 22.82 KG/M2 | TEMPERATURE: 97.6 F | RESPIRATION RATE: 18 BRPM | WEIGHT: 137 LBS | DIASTOLIC BLOOD PRESSURE: 82 MMHG | SYSTOLIC BLOOD PRESSURE: 124 MMHG

## 2024-12-19 DIAGNOSIS — E55.9 VITAMIN D DEFICIENCY, UNSPECIFIED: ICD-10-CM

## 2024-12-19 DIAGNOSIS — I10 ESSENTIAL HYPERTENSION, BENIGN: ICD-10-CM

## 2024-12-19 DIAGNOSIS — Z87.898 HISTORY OF VERTIGO: ICD-10-CM

## 2024-12-19 DIAGNOSIS — N95.9 MENOPAUSAL AND POSTMENOPAUSAL DISORDER: ICD-10-CM

## 2024-12-19 DIAGNOSIS — R41.3 MEMORY CHANGES: ICD-10-CM

## 2024-12-19 DIAGNOSIS — Z00.00 MEDICARE ANNUAL WELLNESS VISIT, SUBSEQUENT: Primary | ICD-10-CM

## 2024-12-19 DIAGNOSIS — H81.4 VERTIGO OF CENTRAL ORIGIN: ICD-10-CM

## 2024-12-19 RX ORDER — ESTRADIOL 0.03 MG/D
PATCH TRANSDERMAL
Qty: 12 PATCH | Refills: 3 | Status: SHIPPED | OUTPATIENT
Start: 2024-12-19

## 2024-12-19 NOTE — PROGRESS NOTES
Preventive Care Visit  [facility]  Ciara Moe MD, Family Medicine  Dec 19, 2024      Assessment & Plan     Medicare annual wellness visit, subsequent  - Home safety information was reviewed if pertinent for falls prevention: regular checkups with vision and hearing, mindful medication use elva with OTCs, using any assisted walking devices, adequate shoes and feet wear, avoiding loose rugs, safety additions to the home if needed, keeping the body in good shape with regular exercise especially walking, and limiting alcohol intake.  - Social history was reviewed  - Patient is independent with activities of daily living  - We reviewed active symptoms and discussed management  - We reviewed list of healthcare providers for this patient.  - We also reviewed PHQ 9    - labs from 12/18/2024 reviewed; substantial improvement in LDL (doing Step One real food supplement program)    Menopausal and postmenopausal disorder  Starting to wean- taking 1/2 patch now  - estradiol (FEMPATCH) 0.025 MG/24HR weekly patch; APPLY 1 PATCH ONCE A WEEK Strength: 0.025 MG/24HR    Essential hypertension, benign  Typically controlled by diet/lifestyle alone; today blood pressure was elevated on arrival due to stress acutely happening this morning.  She has access to a blood pressure cuff at home and she will monitor with goal less than 130/80 typically.    PVCS   reviewed the Zio patch have had monitor  Showing 3% moderate burden ventricular ectopy consistent with PVCs.  She is not particularly symptomatic with these and at this point we will hold off on any medication support.    Memory changes patient this morning  History of vertigo  Suspected Vertigo of central origin  She has been evaluated with neuropsych in September and they did not find formal diagnosis of dementia nor mild cognitive impairment however there were some changes to processing speed etc. that they advised brain MRI as well as her physical  "therapist for vertigo evaluation suggested there is a possibility for cerebrovascular component and so we will proceed with imaging today.  - MRA Brain ([facility]) wo & w Contrast; Future    Vitamin D deficiency, unspecified  Normal level in Sept at 42            Counseling  Appropriate preventive services were addressed with this patient via screening, questionnaire, or discussion as appropriate for fall prevention, nutrition, physical activity, Tobacco-use cessation, social engagement, weight loss and cognition.  Checklist reviewing preventive services available has been given to the patient.  Reviewed patient's diet, addressing concerns and/or questions.   She is at risk for psychosocial distress and has been provided with information to reduce risk.   Discussed possible causes of fatigue.         Alize Winter is a 69 year old, presenting for the following:  Medicare Visit (Discuss  cognitive test result/Blood work [place] yesterday)        12/19/2024     8:53 AM   Additional Questions   Roomed by LINDSEY Vyas   Accompanied by self     HPI  Overall doing well      BP elevated on arrival today due to many stressors (snowstorm and travel plans to help move her parents to assisted living in [place]; was with her neighbor who was having a stroke at 8:30am right before her 9am appt)      She had her baseline neuropsych eval in September- overall not dx with any formal dementia nor any mild cognitive impairment. They did advise fu in 1 year; consideration for brain MRI as well.     She was doing well on the numbers sections; word recall sometimes a little slower with processing (\"I've seen this image before but couldn't recall the word\")      Hx vertigo once a decade or so; recently went back [facility] to work with PT in May and they thought possibly \"brain circulation\" cause for her vertigo and advised brain MRI      She did cut her estrogen patch in half (the patch dose is 0.025mg; so slowly weaning to " half tat)      Has been doing Step 1 bars for whole/healthy foods and her cholesterol really improved.     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  She lives alone and does not have children and enjoys outdoor activities- paddle boarding at [employer]                 Health Care Directive  Patient does not have a Health Care Directive: Discussed advance care planning with patient; information given to patient to review.      12/14/2024   General Health   How would you rate your overall physical health? Excellent   Feel stress (tense, anxious, or unable to sleep) To some extent   (!) STRESS CONCERN      12/14/2024   Nutrition   Diet: Regular (no restrictions)    Low fat/cholesterol       Multiple values from one day are sorted in reverse-chronological order         12/14/2024   Exercise   Days per week of moderate/strenous exercise 5 days   Average minutes spent exercising at this level 40 min         12/14/2024   Social Factors   Frequency of gathering with friends or relatives Three times a week   Worry food won't last until get money to buy more No   Food not last or not have enough money for food? No   Do you have housing? (Housing is defined as stable permanent housing and does not include staying ouside in a car, in a tent, in an abandoned building, in an overnight shelter, or couch-surfing.) Yes   Are you worried about losing your housing? No   Lack of transportation? No   Unable to get utilities (heat,electricity)? No         12/14/2024   Fall Risk   Fallen 2 or more times in the past year? No    No   Trouble with walking or balance? No    No       Multiple values from one day are sorted in reverse-chronological order          12/14/2024   Activities of Daily Living- Home Safety   Needs help with the following daily activites None of the above   Safety concerns in  the home None of the above         12/14/2024   Dental   Dentist two times every year? Yes         12/14/2024   Hearing Screening   Hearing concerns? None of the above         12/14/2024   Driving Risk Screening   Patient/family members have concerns about driving No         12/14/2024   General Alertness/Fatigue Screening   Have you been more tired than usual lately? (!) YES         12/14/2024   Urinary Incontinence Screening   Bothered by leaking urine in past 6 months No         12/14/2024   TB Screening   Were you born outside of the [place]? No         Today's PHQ-2 Score:       12/19/2024     8:51 AM   PHQ-2 ( 1999 Pf[employer])   Q1: Little interest or pleasure in doing things 0   Q2: Feeling down, depressed or hopeless 0   PHQ-2 Score 0    Q1: Little interest or pleasure in doing things Not at all   Q2: Feeling down, depressed or hopeless Not at all   PHQ-2 Score 0       Patient-reported           12/14/2024   Substance Use   Alcohol more than 3/day or more than 7/wk No   Do you have a current opioid prescription? No   How severe/bad is pain from 1 to 10? 0/10 (No Pain)   Do you use any other substances recreationally? No     Social History     Tobacco Use    Smoking status: Never     Passive exposure: Never    Smokeless tobacco: Never   Vaping Use    Vaping status: Never Used   Substance Use Topics    Drug use: No           12/12/2024   LAST FHS-7 RESULTS   1st degree relative breast or ovarian cancer No   Any relative bilateral breast cancer No   Any male have breast cancer No   Any ONE woman have BOTH breast AND ovarian cancer No   Any woman with breast cancer before 50yrs No   2 or more relatives with breast AND/OR ovarian cancer No   2 or more relatives with breast AND/OR bowel cancer No            ASCVD Risk   The 10-year ASCVD risk score (Nik OTERO, et al., 2019) is: 11.3%    Values used to calculate the score:      Age: 69 years      Sex: Female      Is Non- : No      Diabetic:  "No      Tobacco smoker: No      Systolic Blood Pressure: 134 mmHg      Is BP treated: No      HDL Cholesterol: 64 mg/dL      Total Cholesterol: 205 mg/dL            Reviewed and updated as needed this visit by Provider   Tobacco  Allergies  Meds  Problems  Med Hx  Surg Hx  Fam Hx              Current providers sharing in care for this patient include:  Patient Care Team:  Ciara Moe MD as PCP - General (Family Medicine)  Ciara Moe MD as Assigned PCP  Madi Ham, PhD LP as MD (Neuropsychology)  Madi Ham, PhD LP as Assigned Behavioral Health Provider    The following health maintenance items are reviewed in Epic and correct as of today:  Health Maintenance   Topic Date Due    RSV VACCINE (1 - Risk 60-74 years 1-dose series) Never done    MEDICARE ANNUAL WELLNESS VISIT  12/07/2024    ANNUAL REVIEW OF HM ORDERS  09/11/2025    BMP  12/18/2025    LIPID  12/18/2025    FALL RISK ASSESSMENT  12/19/2025    COLORECTAL CANCER SCREENING  12/08/2026    MAMMO SCREENING  12/12/2026    GLUCOSE  12/18/2027    ADVANCE CARE PLANNING  09/11/2029    DTAP/TDAP/TD IMMUNIZATION (7 - Td or Tdap) 11/13/2030    DEXA  12/12/2037    HEPATITIS C SCREENING  Completed    PHQ-2 (once per calendar year)  Completed    INFLUENZA VACCINE  Completed    Pneumococcal Vaccine: 50+ Years  Completed    ZOSTER IMMUNIZATION  Completed    COVID-19 Vaccine  Completed    HPV IMMUNIZATION  Aged Out    MENINGITIS IMMUNIZATION  Aged Out    RSV MONOCLONAL ANTIBODY  Aged Out            Objective    Exam  Temp 97.6  F (36.4  C) (Temporal)   Ht 1.657 m (5' 5.25\")   Wt 62.1 kg (137 lb)   LMP  (LMP Unknown)   BMI 22.62 kg/m     Estimated body mass index is 22.62 kg/m  as calculated from the following:    Height as of this encounter: 1.657 m (5' 5.25\").    Weight as of this encounter: 62.1 kg (137 lb).    Physical Exam  GENERAL: alert and no distress  EYES: Eyes grossly normal to inspection, PERRL and conjunctivae " and sclerae normal  HENT: ear canals and TM's normal, nose and mouth without ulcers or lesions  NECK: no adenopathy, no asymmetry, masses, or scars  RESP: lungs clear to auscultation - no rales, rhonchi or wheezes  CV: regular rate and rhythm, normal S1 S2, no S3 or S4, no murmur, click or rub, no peripheral edema  ABDOMEN: soft, nontender, no hepatosplenomegaly, no masses and bowel sounds normal  MS: no gross musculoskeletal defects noted, no edema  SKIN: no suspicious lesions or rashes  NEURO: Normal strength and tone, mentation intact and speech normal  PSYCH: mentation appears normal, affect normal/bright         12/19/2024   Mini Cog   Clock Draw Score 2 Normal   3 Item Recall --    3 objects recalled   Mini Cog Total Score 5       Multiple values from one day are sorted in reverse-chronological order       Signed Electronically by: Ciara Moe MD

## 2025-02-05 ENCOUNTER — HOSPITAL ENCOUNTER (OUTPATIENT)
Dept: MRI IMAGING | Facility: CLINIC | Age: 70
Discharge: HOME | End: 2025-02-05
Attending: FAMILY MEDICINE
Payer: COMMERCIAL

## 2025-02-05 ENCOUNTER — TELEPHONE (OUTPATIENT)
Dept: FAMILY MEDICINE | Facility: CLINIC | Age: 70
End: 2025-02-05

## 2025-02-05 DIAGNOSIS — R41.3 MEMORY CHANGES: Primary | ICD-10-CM

## 2025-02-05 DIAGNOSIS — M54.2 CERVICALGIA: ICD-10-CM

## 2025-02-05 DIAGNOSIS — H81.4 VERTIGO OF CENTRAL ORIGIN: ICD-10-CM

## 2025-02-05 DIAGNOSIS — Z87.898 HISTORY OF VERTIGO: ICD-10-CM

## 2025-02-05 DIAGNOSIS — R41.3 MEMORY CHANGES: ICD-10-CM

## 2025-02-05 DIAGNOSIS — I10 ESSENTIAL HYPERTENSION, BENIGN: ICD-10-CM

## 2025-02-05 PROCEDURE — A9585 GADOBUTROL INJECTION: HCPCS | Performed by: FAMILY MEDICINE

## 2025-02-05 PROCEDURE — 98013 SYNCH AUDIO-ONLY EST LOW 20: CPT | Performed by: FAMILY MEDICINE

## 2025-02-05 PROCEDURE — 255N000002 HC RX 255 OP 636: Performed by: FAMILY MEDICINE

## 2025-02-05 PROCEDURE — 70544 MR ANGIOGRAPHY HEAD W/O DYE: CPT

## 2025-02-05 PROCEDURE — 70553 MRI BRAIN STEM W/O & W/DYE: CPT

## 2025-02-05 RX ORDER — GADOBUTROL 604.72 MG/ML
6 INJECTION INTRAVENOUS ONCE
Status: COMPLETED | OUTPATIENT
Start: 2025-02-05 | End: 2025-02-05

## 2025-02-05 RX ADMIN — GADOBUTROL 6 ML: 604.72 INJECTION INTRAVENOUS at 10:39

## 2025-02-05 NOTE — TELEPHONE ENCOUNTER
General Call  Reason for Call:  imaging order clarification, pt has imaging appt today at 9:30AM    What are your questions or concerns:  Cassius from  Radiology calling reagrding MRA orders, wants clarification as MRA only shows blood vessels and not brain tissue, please call back to clarify if MRI was intended/should be added    Date of last appointment with provider: 12/19/2024    Could we send this information to you in [employer] or would you prefer to receive a phone call?:   Callback: [phone], Dept: MRI Imaging - Cassius

## 2025-02-05 NOTE — TELEPHONE ENCOUNTER
Phone visit added on today:    Called Maggy to review recent brain MRI/MRA results ; option for neuro referral and she is open to this. She is also open to carotid ultrasound given some of the previous concerns we talked through before.       11 minutes spent on the date of the encounter doing chart review, patient visit and documentation.

## 2025-02-05 NOTE — TELEPHONE ENCOUNTER
Discussed with Cassius via staff message. Yes I would like to see brain and tissues/vessels and they will add the brain MRI order.

## 2025-02-12 ENCOUNTER — HOSPITAL ENCOUNTER (OUTPATIENT)
Dept: ULTRASOUND IMAGING | Facility: CLINIC | Age: 70
Discharge: HOME | End: 2025-02-12
Attending: FAMILY MEDICINE
Payer: COMMERCIAL

## 2025-02-12 DIAGNOSIS — R41.3 MEMORY CHANGES: ICD-10-CM

## 2025-02-12 DIAGNOSIS — M54.2 CERVICALGIA: ICD-10-CM

## 2025-02-12 DIAGNOSIS — Z87.898 HISTORY OF VERTIGO: ICD-10-CM

## 2025-02-12 PROCEDURE — 93880 EXTRACRANIAL BILAT STUDY: CPT

## 2025-02-13 ENCOUNTER — OFFICE VISIT (OUTPATIENT)
Dept: NEUROLOGY | Facility: CLINIC | Age: 70
End: 2025-02-13
Attending: FAMILY MEDICINE
Payer: COMMERCIAL

## 2025-02-13 VITALS
DIASTOLIC BLOOD PRESSURE: 78 MMHG | SYSTOLIC BLOOD PRESSURE: 126 MMHG | BODY MASS INDEX: 22.79 KG/M2 | HEART RATE: 80 BPM | WEIGHT: 138 LBS

## 2025-02-13 DIAGNOSIS — R41.9 NEUROCOGNITIVE DISORDER: ICD-10-CM

## 2025-02-13 DIAGNOSIS — Z87.898 HISTORY OF VERTIGO: ICD-10-CM

## 2025-02-13 PROBLEM — M89.9 DISORDER OF BONE AND CARTILAGE: Status: RESOLVED | Noted: 2022-11-16 | Resolved: 2025-02-13

## 2025-02-13 PROBLEM — M94.9 DISORDER OF BONE AND CARTILAGE: Status: RESOLVED | Noted: 2022-11-16 | Resolved: 2025-02-13

## 2025-02-13 RX ORDER — CETIRIZINE HYDROCHLORIDE 5 MG/1
5 TABLET ORAL DAILY
COMMUNITY

## 2025-02-13 RX ORDER — MULTIVIT WITH MINERALS/LUTEIN
1000 TABLET ORAL 2 TIMES DAILY
COMMUNITY

## 2025-02-13 ASSESSMENT — MONTREAL COGNITIVE ASSESSMENT (MOCA)
8. SERIAL SUBTRACTION OF 7S: 3
13. ORIENTATION SUBSCORE: 6
12. MEMORY INDEX SCORE: 5
7. [VIGILENCE] TAP WHEN HEARING DESIGNATED LETTER: 1
10. [FLUENCY] NAME WORDS STARTING WITH DESIGNATED LETTER: 1
WHAT LEVEL OF EDUCATION WAS ATTAINED: 0
4. NAME EACH OF THE THREE ANIMALS SHOWN: 3
VISUOSPATIAL/EXECUTIVE SUBSCORE: 5
9. REPEAT EACH SENTENCE: 2
WHAT IS THE TOTAL SCORE (OUT OF 30): 29
11. FOR EACH PAIR OF WORDS, WHAT CATEGORY DO THEY BELONG TO (OUT OF 2): 2
6. READ LIST OF DIGITS [FORWARD/BACKWARD]: 1

## 2025-02-13 NOTE — PROGRESS NOTES
NEUROLOGY CONSULTATION NOTE       [facility] NEUROLOGY [place]  [address] [place] [address]  Tel: [phone]  Fax: [phone]  [email or web address]     Suzanne Malik,  1955, MRN [id number]  PCP: Ciara Moe  Date: 2025     ASSESSMENT & PLAN     Visit Diagnosis  Neurocognitive disorder  History of vertigo     Subjective cognitive decline  69-year-old female with history of HTN, HLD who was referred for evaluation of subjective feeling of cognitive decline.  She had a MoCA score of 29/30.  Previously neuropsychological testing done on 2024 was also unremarkable.  She had lab work that included normal B12 and TSH.  MRI brain and MRA was normal there was signal within the left frontal horn likely representing an artifact.  Patient does admit she is under stress as her parents live in [place] and still independent and she has to travel there to take care of them.  I have reassured her so far workup is negative and abnormality noted on MRI scan likely is an artifact but I have recommended:    1.  CT brain with and without contrast  2.  Additional lab work to include folate, homocystine, , MMA, RPR, B1 and vitamin E.  Previously vitamin B12 and TSH were normal  3.  If above tests are normal no further workup will be needed from neurology standpoint.  4.  Follow-up on as needed basis    Thank you again for this referral, please feel free to contact me if you have any questions.    Dez Bermeo MD  [facility]     REASON FOR CONSULTATION Memory Loss and Vertigo        HISTORY OF PRESENT ILLNESS     We have been requested by Dr. Moe to evaluate Suzanne Malik who is a 69 year old  female for cognitive decline    Patient is 69-year-old female with history of HTN, HLD who was referred for evaluation of progressive cognitive decline.  She initially reported the symptoms to Dr. Moe and had lab work that included a  normal TSH and B12.  She subsequently had a neuropsychological evaluation that showed mild weakness and variability that raise the possibility of subcortical system.  Subsequent she had MRI of the head and MRA that showed no significant intracranial stenosis.  There was signal within the left frontal horn likely reflecting flow related artifact but a CT scan was recommended.  According to patient she started having some cognitive issues a couple of years ago.  While playing pickle ball she could not remember the score.  She does admit she is under a lot of stress as her parents are old and still live independently in [place] she has to travel to take care of them.  She denies any bowel or bladder incontinence, visual, auditory hallucinations.  She lives independently and has not done anything that puts her other people at risk.    She also reports 3 episodes of dizziness that occurred over the years most recent more than a year ago.  She woke up in the morning and had a bout of dizziness.  She also went through some vestibular exercises and has not experienced any further episodes of dizziness.  She had a Zio patch that showed minimal paroxysmal supraventricular tachycardia.     PROBLEM LIST   Patient Active Problem List   Diagnosis    Essential hypertension, benign    Menopausal and postmenopausal disorder    Allergic state    Mixed hyperlipidemia    Osteoarthrosis    Hypoxemia associated with sleep         PAST MEDICAL & SURGICAL HISTORY     Past Medical History:   Patient  has a past medical history of Allergy, unspecified not elsewhere classified, Basal cell cancer (2014), Breast cyst (2008), Breast cyst (2011), Diffuse cystic mastopathy, Essential hypertension, benign, Intestinal disaccharidase deficiencies and disaccharide malabsorption, Migraine, unspecified, without mention of intractable migraine without mention of status migrainosus, and Unspecified menopausal and postmenopausal disorder.    Surgical  History:  She  has a past surgical history that includes VAG HYST,RMV TUBE/OVARY (01/1998); APPENDECTOMY (8 years old); REMOVE TONSILS/ADENOIDS,<9 Y/O; REMOVAL OF TONSILS,<11 Y/O; APPENDECTOMY; TOTAL ABDOM HYSTERECTOMY (1998); Breast Cyst Aspiration (Left, 2008); Breast Cyst Aspiration (Right, 2011); Basal Cell Carcinoma Excision (2014); Hysterectomy; and Oophorectomy.     SOCIAL HISTORY     Reviewed, and she  reports that she has never smoked. She has never been exposed to tobacco smoke. She has never used smokeless tobacco. She reports that she does not use drugs.     FAMILY HISTORY     Reviewed, and family history includes C.A.D. in her father; Cerebrovascular Disease in her maternal grandfather and maternal grandmother; Deep Vein Thrombosis in her father; Heart Disease in her father; Hyperlipidemia in her mother; Hypertension in her father and mother; No Known Problems in her brother; Pulmonary Embolism in her father.     ALLERGIES     Allergies   Allergen Reactions    Ragweeds      Food oral allergy syndrome         REVIEW OF SYSTEMS     A 12 point review of system was performed and was negative except as outlined in the history of present illness.     HOME MEDICATIONS     Current Outpatient Rx   Medication Sig Dispense Refill    Azelastine HCl 137 MCG/SPRAY SOLN SPRAY 1 SPRAY INTO BOTH NOSTRILS DAILY AS NEEDED FOR ALLERGIES 30 mL 2    Calcium-Magnesium-Zinc 333-133-5 MG TABS per tablet Take 1 tablet by mouth daily.      cetirizine (ZYRTEC) 5 MG tablet Take 5 mg by mouth daily.      estradiol (FEMPATCH) 0.025 MG/24HR weekly patch APPLY 1 PATCH ONCE A WEEK Strength: 0.025 MG/24HR 12 patch 3    famotidine (PEPCID) 10 MG tablet Take 10 mg by mouth nightly as needed.      fluticasone furoate 27.5 MCG/SPRAY nasal spray Spray 2 sprays into both nostrils daily.      ibuprofen (ADVIL/MOTRIN) 200 MG tablet Take 200 mg by mouth every 4 hours as needed.      MAGNESIUM CITRATE PO Take by mouth.      meclizine (DRAMAMINE  LESS DROWSY) 25 MG tablet Take 25 mg by mouth 3 times daily as needed.      melatonin 3 MG tablet Take 3 mg by mouth nightly as needed.      PATADAY 0.2 % OP SOLN Apply to eye as needed       Probiotic Product (DIGESTIVE ADVANTAGE PO) Digestive Spectrum      Probiotic Product (PROBIOTIC PO) Take by mouth daily Low histamine probiotic      UNABLE TO FIND MEDICATION NAME: HistaResist      vitamin B-Complex Take 1 tablet by mouth daily.      vitamin C (ASCORBIC ACID) 1000 MG TABS Take 1,000 mg by mouth 2 times daily.      Vitamin D3 (D3-1000) 25 mcg (1000 units) tablet Take 1 tablet by mouth 2 times daily.           PHYSICAL EXAM     Vital signs  /78 (BP Location: Right arm, Patient Position: Sitting)   Pulse 80   Wt 62.6 kg (138 lb)   LMP  (LMP Unknown)   BMI 22.79 kg/m      Weight:   138 lbs 0 oz      2/13/2025    11:17 AM   MOCA   Visuospatial/Executive  5   Naming 3   Attention - Digits 1   Attention - Letters 1   Attention - Subtraction 3   Language - Repeat 2   Language - Fluency  1   Abstraction 2   Delayed Recall 5   Orientation 6   Education 0   MOCA Score 29   Administered by:  Nicky APARICIO CMA       Patient is alert and oriented x4 in no acute distress. Vital signs were reviewed and are documented in electronic medical record. Neck was supple, no carotid bruits, thyromegaly, JVD, or lymphadenopathy was noted.   NEUROLOGY EXAM:   Patient s speech was normal with no aphasia or dysarthria. Mentation, and affect were also normal.    Funduscopic exam was normal, with normal cup to disc ratio. Cranial nerves II -XII were intact.    Patient had normal mass, tone and motor strength was 5/5 in all extremities without pronator drift.    Sensation was intact to light touch, pinprick, and vibratory sensation.    Reflexes were 1+ symmetrical with downgoing toes.    No dysmetria noted on FNF or HKS. Romberg was negative.   Gait testing was normal. Able to walk on toes/heels. Tandem walk normal.     PERTINENT  DIAGNOSTIC STUDIES     Following studies were reviewed:     MRI, MRA BRAIN 12/25/2025  HEAD MRI:   1.  No finding for acute infarct, intracranial hemorrhage, or abnormally enhancing mass.     2.  Signal within the left frontal horn likely reflects flow related artifact, though this shows up on multiple sequences. As such, intraventricular mass is not completely excluded. Head CT would be of benefit in further assessment.     3.  Background mild age-related changes as above.     HEAD MRA:   1.  No significant intracranial stenosis. No aneurysm and no high flow vascular malformation.    CAROTID ULTRASOUND 2/12/2025  1.  Mild plaque formation, velocities consistent with less than 50% stenosis in the right internal carotid artery.  2.  Mild plaque formation, velocities consistent with less than 50% stenosis in the left internal carotid artery.  3.  Flow within the vertebral arteries is antegrade.    ZIO PATCH 11/13/2024  ? Borderline multiday cardiac patch monitor by virtue of the presence of modestly increased PVCs.  It is not entirely clear whether the patient's ventricular ectopy is the sole cause of her symptoms as some tracings demonstrated sinus rhythm alone and other tracings and extremely rare ventricular ectopy.  ? No sustained atrial or ventricular tachyarrhythmia.  ? No profound bradycardia or significant/symptomatic pauses.    NEUROPSYCHOLOGICAL EVALUATION 9/16/2024  Ms. Malik demonstrated mild weaknesses and variability that raise the question of subtle dysfunction of subcortical systems. She does not meet criteria for a neurocognitive diagnosis (e.g., dementia or mild cognitive impairments). Speculatively, given her medical history, these findings could be explained by cerebrovascular changes. However, factors such as anxiety, hypoxemia during sleep, and normal variability could be contributing. On evaluation, Ms. Malik demonstrated mild variability in processing speed and verbal fluency. Her processing  speed abilities ranged from high average to low average, while verbal fluency abilities ranged from average to low average. There is also some variability in executive functioning. Nonverbal reasoning was a personal strength. The remainder of her cognitive abilities were normal and performed in keeping with her generally high average range cognitive baseline. She reported minimal symptoms of depression.      RECOMMENDATIONS  Preliminary results and recommendations were provided to Ms. Malik via telephone on 9/16/2024, and all her questions were answered.       A referral for brain imaging (e.g., MRI) may be considered to quantify atrophy or vascular burden and to rule out an emerging cerebrovascular process.     Ms. Malik reported a history of possible hypoxemia during sleep on today's evaluation. She completed a sleep clinic consult on 12/1/2023 but did not undergo a formal sleep study. If medically indicated, she may consider a second opinion to rule out an underlying sleep disorder.       Ms. Malik is encouraged to sustain a physically active lifestyle, as able and in consultation with her medical providers. Regular exercise at a mild to moderate level is the best lifestyle tool we have for maintaining cognitive health as we age.      She should continue management of her chronic vascular conditions (e.g., hypertension, hyperlipidemia) through regular follow-up with her medical providers.       If she continues to have difficulties with memory, she may benefit from routine use of a memory notebook or other assistive device. Additionally, she may incorporate mindfulness techniques into her daily routine by paying mindful attention to and making a conscious effort to learn new information.      A neuropsychological re-evaluation is recommended in approximately 1 year to assess for cognitive changes and to update recommendations, as needed. The current results can be used as a baseline at that time.       PERTINENT LABS  Following labs were reviewed:  Lab on 12/18/2024   Component Date Value Ref Range Status    Cholesterol 12/18/2024 205 (H)  <200 mg/dL Final    Triglycerides 12/18/2024 110  <150 mg/dL Final    Direct Measure HDL 12/18/2024 64  >=50 mg/dL Final    LDL Cholesterol Calculated 12/18/2024 119 (H)  <100 mg/dL Final    Non HDL Cholesterol 12/18/2024 141 (H)  <130 mg/dL Final    Patient Fasting > 8hrs? 12/18/2024 Yes   Final    Sodium 12/18/2024 142  135 - 145 mmol/L Final    Potassium 12/18/2024 4.1  3.4 - 5.3 mmol/L Final    Chloride 12/18/2024 105  98 - 107 mmol/L Final    Carbon Dioxide (CO2) 12/18/2024 28  22 - 29 mmol/L Final    Anion Gap 12/18/2024 9  7 - 15 mmol/L Final    Urea Nitrogen 12/18/2024 13.7  8.0 - 23.0 mg/dL Final    Creatinine 12/18/2024 0.90  0.51 - 0.95 mg/dL Final    GFR Estimate 12/18/2024 68  >60 mL/min/1.73m2 Final    Calcium 12/18/2024 9.4  8.8 - 10.4 mg/dL Final    Glucose 12/18/2024 81  70 - 99 mg/dL Final    Patient Fasting > 8hrs? 12/18/2024 Yes   Final    WBC Count 12/18/2024 5.5  4.0 - 11.0 10e3/uL Final    RBC Count 12/18/2024 4.42  3.80 - 5.20 10e6/uL Final    Hemoglobin 12/18/2024 13.8  11.7 - 15.7 g/dL Final    Hematocrit 12/18/2024 41.6  35.0 - 47.0 % Final    MCV 12/18/2024 94  78 - 100 fL Final    MCH 12/18/2024 31.2  26.5 - 33.0 pg Final    MCHC 12/18/2024 33.2  31.5 - 36.5 g/dL Final    RDW 12/18/2024 11.8  10.0 - 15.0 % Final    Platelet Count 12/18/2024 274  150 - 450 10e3/uL Final    Estimated Average Glucose 12/18/2024 105  <117 mg/dL Final    Hemoglobin A1C 12/18/2024 5.3  0.0 - 5.6 % Final        Total time spent for face to face visit, reviewing labs/imaging studies, counseling and coordination of care was: 1 Hour spent on the date of the encounter doing chart review, review of outside records, review of test results, interpretation of tests, patient visit, documentation, and discussion with family     The longitudinal plan of care for the  diagnosis(es)/condition(s) as documented were addressed during this visit. Due to the added complexity in care, I will continue to support Maggy in the subsequent management and with ongoing continuity of care.    This note was dictated using voice recognition software.  Any grammatical or context distortions are unintentional and inherent to the software.    Orders Placed This Encounter   Procedures    CT Head w/o & w Contrast    Folate    Homocysteine    [facility]; ; PHOSPHO , Plasma (Laboratory Miscellaneous Order)    Methylmalonic Acid    Treponema Abs w Reflex to RPR and Titer    Vitamin B1 whole blood    Vitamin E      New Prescriptions    No medications on file      Modified Medications    No medications on file

## 2025-02-13 NOTE — NURSING NOTE
ESTELLA COGNITIVE ASSESSMENT (MOCA)  Version 7.1 Original Version  VISUOSPATIAL/EXECUTIVE               COPY CUBE      [   1 ]                                [   1 ] DRAW CLOCK (Ten past eleven)  (3 points)    [  1  ]                    [ 1   ]               [ 1   ]       Contour            Numbers     Hands POINTS                5   / 5   NAMING    [ 1  ]                                                                        [  1  ]                                             [    1]  Liwayne Henry                                Camel                3     / 3   MEMORY Read list of words, subject must repeat them. Do 2 trials, even if 1st trial is successful. Do a recall after 5 minutes  FACE VELVET Religious ATIF RED No Points    1st x x x x x     2nd x x x x x    ATTENTION Read list of digits (1 digit/sec) Subject has to repeat in the forward order       [  0  ]   2  1  8  5  4                                [ 1   ] 7 4 2                    1      /2   Read list of letters. The subject must tap with his hand at each letter A. No points if > 2 errors.  [ 1   ] F B A C M N A A J K L B A F A K D E A A A J A M O F A A B           1   /1   Serial 7 subtraction starting at 100          [ 1   ] 93         [  1  ] 86          [  1  ] 79          [  1  ] 72         [ 1   ] 65   4 or 5 correct subtractions: 3 points,  2 or 3 correct: 2 points,  1correct: 1 point,   0 correct: 0 points         3   /3   LANGUAGE Repeat: I only know that Jerzy is the one to help today. [ 1    ]                                      The cat always hid under the couch when dogs were in the room. [  1 ]           2    /2   Fluency: Name maximum number of words in one minute that begin with the letter F                                                                                                                    [ 1   ] _11__ (N > 11 words)          1 /1   ABSTRACTION  Similarity between e.g. banana-orange=fruit                                                                   [  1  ] train-bicycle                      [ 1   ] watch-ruler       2       /2   DELAYED  RECALL Has to recall words  WITH NO CUE FACE  [  1  ] VELVET  [ 1   ] Bahai  [ 1   ]  ATIF  [ 1   ] RED  [ 1   ] Points for UNCUED recall only        5    /5           OPTIONAL Category cue           Multiple choice cue          ORIENTATION  [ 1   ] Date     [  1  ] Month       [  1  ] Year      [ 1   ] Day      [ 1   ] Place        [  1  ] City      6 /6   TOTAL  Normal > 26/30 Add 1 point if < 12 years education    29    /30

## 2025-02-13 NOTE — LETTER
2025      Suzanne Malik  [address] [place] [address]      Dear Colleague,    Thank you for referring your patient, Suzanne Malik, to the [facility] NEUROLOGY CLIN[facility] [place]. Please see a copy of my visit note below.    NEUROLOGY CONSULTATION NOTE       [facility] NEUROLOGY [place]  [address] [place] [address]  Tel: [phone]  Fax: [phone]  [email or web address]     Suzanne Malik,  1955, MRN [id number]  PCP: Ciara Moe  Date: 2025     ASSESSMENT & PLAN     Visit Diagnosis  Neurocognitive disorder  History of vertigo     Subjective cognitive decline  69-year-old female with history of HTN, HLD who was referred for evaluation of subjective feeling of cognitive decline.  She had a MoCA score of 29/30.  Previously neuropsychological testing done on 2024 was also unremarkable.  She had lab work that included normal B12 and TSH.  MRI brain and MRA was normal there was signal within the left frontal horn likely representing an artifact.  Patient does admit she is under stress as her parents live in [place] and still independent and she has to travel there to take care of them.  I have reassured her so far workup is negative and abnormality noted on MRI scan likely is an artifact but I have recommended:    1.  CT brain with and without contrast  2.  Additional lab work to include folate, homocystine, , MMA, RPR, B1 and vitamin E.  Previously vitamin B12 and TSH were normal  3.  If above tests are normal no further workup will be needed from neurology standpoint.  4.  Follow-up on as needed basis    Thank you again for this referral, please feel free to contact me if you have any questions.    Dez Bermeo MD  [facility] NEUROLOGY, [place]     REASON FOR CONSULTATION Memory Loss and Vertigo        HISTORY OF PRESENT ILLNESS     We have been requested by Dr. Moe to evaluate Suzanne Malik who is a 69  year old  female for cognitive decline    Patient is 69-year-old female with history of HTN, HLD who was referred for evaluation of progressive cognitive decline.  She initially reported the symptoms to Dr. Moe and had lab work that included a normal TSH and B12.  She subsequently had a neuropsychological evaluation that showed mild weakness and variability that raise the possibility of subcortical system.  Subsequent she had MRI of the head and MRA that showed no significant intracranial stenosis.  There was signal within the left frontal horn likely reflecting flow related artifact but a CT scan was recommended.  According to patient she started having some cognitive issues a couple of years ago.  While playing pickle ball she could not remember the score.  She does admit she is under a lot of stress as her parents are old and still live independently in [place] she has to travel to take care of them.  She denies any bowel or bladder incontinence, visual, auditory hallucinations.  She lives independently and has not done anything that puts her other people at risk.    She also reports 3 episodes of dizziness that occurred over the years most recent more than a year ago.  She woke up in the morning and had a bout of dizziness.  She also went through some vestibular exercises and has not experienced any further episodes of dizziness.  She had a Zio patch that showed minimal paroxysmal supraventricular tachycardia.     PROBLEM LIST   Patient Active Problem List   Diagnosis     Essential hypertension, benign     Menopausal and postmenopausal disorder     Allergic state     Mixed hyperlipidemia     Osteoarthrosis     Hypoxemia associated with sleep         PAST MEDICAL & SURGICAL HISTORY     Past Medical History:   Patient  has a past medical history of Allergy, unspecified not elsewhere classified, Basal cell cancer (2014), Breast cyst (2008), Breast cyst (2011), Diffuse cystic mastopathy, Essential  hypertension, benign, Intestinal disaccharidase deficiencies and disaccharide malabsorption, Migraine, unspecified, without mention of intractable migraine without mention of status migrainosus, and Unspecified menopausal and postmenopausal disorder.    Surgical History:  She  has a past surgical history that includes VAG HYST,RMV TUBE/OVARY (01/1998); APPENDECTOMY (8 years old); REMOVE TONSILS/ADENOIDS,<9 Y/O; REMOVAL OF TONSILS,<11 Y/O; APPENDECTOMY; TOTAL ABDOM HYSTERECTOMY (1998); Breast Cyst Aspiration (Left, 2008); Breast Cyst Aspiration (Right, 2011); Basal Cell Carcinoma Excision (2014); Hysterectomy; and Oophorectomy.     SOCIAL HISTORY     Reviewed, and she  reports that she has never smoked. She has never been exposed to tobacco smoke. She has never used smokeless tobacco. She reports that she does not use drugs.     FAMILY HISTORY     Reviewed, and family history includes C.A.D. in her father; Cerebrovascular Disease in her maternal grandfather and maternal grandmother; Deep Vein Thrombosis in her father; Heart Disease in her father; Hyperlipidemia in her mother; Hypertension in her father and mother; No Known Problems in her brother; Pulmonary Embolism in her father.     ALLERGIES     Allergies   Allergen Reactions     Ragweeds      Food oral allergy syndrome         REVIEW OF SYSTEMS     A 12 point review of system was performed and was negative except as outlined in the history of present illness.     HOME MEDICATIONS     Current Outpatient Rx   Medication Sig Dispense Refill     Azelastine HCl 137 MCG/SPRAY SOLN SPRAY 1 SPRAY INTO BOTH NOSTRILS DAILY AS NEEDED FOR ALLERGIES 30 mL 2     Calcium-Magnesium-Zinc 333-133-5 MG TABS per tablet Take 1 tablet by mouth daily.       cetirizine (ZYRTEC) 5 MG tablet Take 5 mg by mouth daily.       estradiol (FEMPATCH) 0.025 MG/24HR weekly patch APPLY 1 PATCH ONCE A WEEK Strength: 0.025 MG/24HR 12 patch 3     famotidine (PEPCID) 10 MG tablet Take 10 mg by mouth  nightly as needed.       fluticasone furoate 27.5 MCG/SPRAY nasal spray Spray 2 sprays into both nostrils daily.       ibuprofen (ADVIL/MOTRIN) 200 MG tablet Take 200 mg by mouth every 4 hours as needed.       MAGNESIUM CITRATE PO Take by mouth.       meclizine (DRAMAMINE LESS DROWSY) 25 MG tablet Take 25 mg by mouth 3 times daily as needed.       melatonin 3 MG tablet Take 3 mg by mouth nightly as needed.       PATADAY 0.2 % OP SOLN Apply to eye as needed        Probiotic Product (DIGESTIVE ADVANTAGE PO) Digestive Spectrum       Probiotic Product (PROBIOTIC PO) Take by mouth daily Low histamine probiotic       UNABLE TO FIND MEDICATION NAME: HistaResist       vitamin B-Complex Take 1 tablet by mouth daily.       vitamin C (ASCORBIC ACID) 1000 MG TABS Take 1,000 mg by mouth 2 times daily.       Vitamin D3 (D3-1000) 25 mcg (1000 units) tablet Take 1 tablet by mouth 2 times daily.           PHYSICAL EXAM     Vital signs  /78 (BP Location: Right arm, Patient Position: Sitting)   Pulse 80   Wt 62.6 kg (138 lb)   LMP  (LMP Unknown)   BMI 22.79 kg/m      Weight:   138 lbs 0 oz      2/13/2025    11:17 AM   MOCA   Visuospatial/Executive  5   Naming 3   Attention - Digits 1   Attention - Letters 1   Attention - Subtraction 3   Language - Repeat 2   Language - Fluency  1   Abstraction 2   Delayed Recall 5   Orientation 6   Education 0   MOCA Score 29   Administered by:  Nicky APARICIO CMA       Patient is alert and oriented x4 in no acute distress. Vital signs were reviewed and are documented in electronic medical record. Neck was supple, no carotid bruits, thyromegaly, JVD, or lymphadenopathy was noted.   NEUROLOGY EXAM:    Patient s speech was normal with no aphasia or dysarthria. Mentation, and affect were also normal.     Funduscopic exam was normal, with normal cup to disc ratio. Cranial nerves II -XII were intact.     Patient had normal mass, tone and motor strength was 5/5 in all extremities without pronator  drift.     Sensation was intact to light touch, pinprick, and vibratory sensation.     Reflexes were 1+ symmetrical with downgoing toes.     No dysmetria noted on FNF or HKS. Romberg was negative.    Gait testing was normal. Able to walk on toes/heels. Tandem walk normal.     PERTINENT DIAGNOSTIC STUDIES     Following studies were reviewed:     MRI, MRA BRAIN 12/25/2025  HEAD MRI:   1.  No finding for acute infarct, intracranial hemorrhage, or abnormally enhancing mass.     2.  Signal within the left frontal horn likely reflects flow related artifact, though this shows up on multiple sequences. As such, intraventricular mass is not completely excluded. Head CT would be of benefit in further assessment.     3.  Background mild age-related changes as above.     HEAD MRA:   1.  No significant intracranial stenosis. No aneurysm and no high flow vascular malformation.    CAROTID ULTRASOUND 2/12/2025  1.  Mild plaque formation, velocities consistent with less than 50% stenosis in the right internal carotid artery.  2.  Mild plaque formation, velocities consistent with less than 50% stenosis in the left internal carotid artery.  3.  Flow within the vertebral arteries is antegrade.    ZIO PATCH 11/13/2024  ? Borderline multiday cardiac patch monitor by virtue of the presence of modestly increased PVCs.  It is not entirely clear whether the patient's ventricular ectopy is the sole cause of her symptoms as some tracings demonstrated sinus rhythm alone and other tracings and extremely rare ventricular ectopy.  ? No sustained atrial or ventricular tachyarrhythmia.  ? No profound bradycardia or significant/symptomatic pauses.    NEUROPSYCHOLOGICAL EVALUATION 9/16/2024  Ms. Malik demonstrated mild weaknesses and variability that raise the question of subtle dysfunction of subcortical systems. She does not meet criteria for a neurocognitive diagnosis (e.g., dementia or mild cognitive impairments). Speculatively, given her medical  history, these findings could be explained by cerebrovascular changes. However, factors such as anxiety, hypoxemia during sleep, and normal variability could be contributing. On evaluation, Ms. Malik demonstrated mild variability in processing speed and verbal fluency. Her processing speed abilities ranged from high average to low average, while verbal fluency abilities ranged from average to low average. There is also some variability in executive functioning. Nonverbal reasoning was a personal strength. The remainder of her cognitive abilities were normal and performed in keeping with her generally high average range cognitive baseline. She reported minimal symptoms of depression.      RECOMMENDATIONS  Preliminary results and recommendations were provided to Ms. Malik via telephone on 9/16/2024, and all her questions were answered.       A referral for brain imaging (e.g., MRI) may be considered to quantify atrophy or vascular burden and to rule out an emerging cerebrovascular process.     Ms. Malik reported a history of possible hypoxemia during sleep on today's evaluation. She completed a sleep clinic consult on 12/1/2023 but did not undergo a formal sleep study. If medically indicated, she may consider a second opinion to rule out an underlying sleep disorder.       Ms. Malik is encouraged to sustain a physically active lifestyle, as able and in consultation with her medical providers. Regular exercise at a mild to moderate level is the best lifestyle tool we have for maintaining cognitive health as we age.      She should continue management of her chronic vascular conditions (e.g., hypertension, hyperlipidemia) through regular follow-up with her medical providers.       If she continues to have difficulties with memory, she may benefit from routine use of a memory notebook or other assistive device. Additionally, she may incorporate mindfulness techniques into her daily routine by paying mindful attention  to and making a conscious effort to learn new information.      A neuropsychological re-evaluation is recommended in approximately 1 year to assess for cognitive changes and to update recommendations, as needed. The current results can be used as a baseline at that time.      PERTINENT LABS  Following labs were reviewed:  Lab on 12/18/2024   Component Date Value Ref Range Status     Cholesterol 12/18/2024 205 (H)  <200 mg/dL Final     Triglycerides 12/18/2024 110  <150 mg/dL Final     Direct Measure HDL 12/18/2024 64  >=50 mg/dL Final     LDL Cholesterol Calculated 12/18/2024 119 (H)  <100 mg/dL Final     Non HDL Cholesterol 12/18/2024 141 (H)  <130 mg/dL Final     Patient Fasting > 8hrs? 12/18/2024 Yes   Final     Sodium 12/18/2024 142  135 - 145 mmol/L Final     Potassium 12/18/2024 4.1  3.4 - 5.3 mmol/L Final     Chloride 12/18/2024 105  98 - 107 mmol/L Final     Carbon Dioxide (CO2) 12/18/2024 28  22 - 29 mmol/L Final     Anion Gap 12/18/2024 9  7 - 15 mmol/L Final     Urea Nitrogen 12/18/2024 13.7  8.0 - 23.0 mg/dL Final     Creatinine 12/18/2024 0.90  0.51 - 0.95 mg/dL Final     GFR Estimate 12/18/2024 68  >60 mL/min/1.73m2 Final     Calcium 12/18/2024 9.4  8.8 - 10.4 mg/dL Final     Glucose 12/18/2024 81  70 - 99 mg/dL Final     Patient Fasting > 8hrs? 12/18/2024 Yes   Final     WBC Count 12/18/2024 5.5  4.0 - 11.0 10e3/uL Final     RBC Count 12/18/2024 4.42  3.80 - 5.20 10e6/uL Final     Hemoglobin 12/18/2024 13.8  11.7 - 15.7 g/dL Final     Hematocrit 12/18/2024 41.6  35.0 - 47.0 % Final     MCV 12/18/2024 94  78 - 100 fL Final     MCH 12/18/2024 31.2  26.5 - 33.0 pg Final     MCHC 12/18/2024 33.2  31.5 - 36.5 g/dL Final     RDW 12/18/2024 11.8  10.0 - 15.0 % Final     Platelet Count 12/18/2024 274  150 - 450 10e3/uL Final     Estimated Average Glucose 12/18/2024 105  <117 mg/dL Final     Hemoglobin A1C 12/18/2024 5.3  0.0 - 5.6 % Final        Total time spent for face to face visit, reviewing labs/imaging  studies, counseling and coordination of care was: 1 Hour spent on the date of the encounter doing chart review, review of outside records, review of test results, interpretation of tests, patient visit, documentation, and discussion with family     The longitudinal plan of care for the diagnosis(es)/condition(s) as documented were addressed during this visit. Due to the added complexity in care, I will continue to support Maggy in the subsequent management and with ongoing continuity of care.    This note was dictated using voice recognition software.  Any grammatical or context distortions are unintentional and inherent to the software.    Orders Placed This Encounter   Procedures     CT Head w/o & w Contrast     Folate     Homocysteine     [facility] eCurv; ; PHOSPHO , Plasma (Laboratory Miscellaneous Order)     Methylmalonic Acid     Treponema Abs w Reflex to RPR and Titer     Vitamin B1 whole blood     Vitamin E      New Prescriptions    No medications on file      Modified Medications    No medications on file                Again, thank you for allowing me to participate in the care of your patient.        Sincerely,        Dez Bermeo MD    Electronically signed

## 2025-02-13 NOTE — NURSING NOTE
Chief Complaint   Patient presents with    Memory Loss     MoCA 29/30 NPE 9/16/24    Vertigo     See patients detailed symptoms list brought with to visit      Nicky APARICIO CMA on 2/13/2025 at 11:25 AM  [facility]

## 2025-02-15 ENCOUNTER — LAB (OUTPATIENT)
Dept: LAB | Facility: CLINIC | Age: 70
End: 2025-02-15
Payer: COMMERCIAL

## 2025-02-15 DIAGNOSIS — R41.9 NEUROCOGNITIVE DISORDER: ICD-10-CM

## 2025-02-15 LAB
FOLATE SERPL-MCNC: 22.8 NG/ML (ref 4.6–34.8)
HCYS SERPL-SCNC: 7.7 UMOL/L (ref 0–15)

## 2025-02-15 PROCEDURE — 83921 ORGANIC ACID SINGLE QUANT: CPT

## 2025-02-15 PROCEDURE — 86780 TREPONEMA PALLIDUM: CPT

## 2025-02-15 PROCEDURE — 82746 ASSAY OF FOLIC ACID SERUM: CPT

## 2025-02-15 PROCEDURE — 36415 COLL VENOUS BLD VENIPUNCTURE: CPT

## 2025-02-15 PROCEDURE — 83090 ASSAY OF HOMOCYSTEINE: CPT

## 2025-02-15 PROCEDURE — 84425 ASSAY OF VITAMIN B-1: CPT

## 2025-02-15 PROCEDURE — 84446 ASSAY OF VITAMIN E: CPT

## 2025-02-16 LAB — T PALLIDUM AB SER QL: NONREACTIVE

## 2025-02-17 LAB
Lab: NORMAL
PERFORMING LABORATORY: NORMAL
SPECIMEN STATUS: NORMAL
TEST NAME: NORMAL
VIT B1 PYROPHOSHATE BLD-SCNC: 133 NMOL/L

## 2025-02-18 DIAGNOSIS — R41.9 NEUROCOGNITIVE DISORDER: Primary | ICD-10-CM

## 2025-02-18 LAB — MAYO MISC RESULT: ABNORMAL

## 2025-02-18 NOTE — RESULT ENCOUNTER NOTE
Dear Maggy    As we discussed on the phone,  is elevated that sometime can be consistent with presence of changes associated with Alzheimer's dementia.  I would recommend lumbar puncture to check for Alzheimer's disease evaluation.  I have entered an order for a lumbar puncture and radiology will contact you to schedule.    Regards,  Dez Bermeo MD

## 2025-02-19 LAB — METHYLMALONATE SERPL-SCNC: 0.13 UMOL/L (ref 0–0.4)

## 2025-02-20 LAB
A-TOCOPHEROL VIT E SERPL-MCNC: 11.7 MG/L
BETA+GAMMA TOCOPHEROL SERPL-MCNC: 1.3 MG/L

## 2025-03-21 ENCOUNTER — HOSPITAL ENCOUNTER (OUTPATIENT)
Dept: CT IMAGING | Facility: CLINIC | Age: 70
Discharge: HOME | End: 2025-03-21
Attending: PSYCHIATRY & NEUROLOGY | Admitting: PSYCHIATRY & NEUROLOGY
Payer: COMMERCIAL

## 2025-03-21 DIAGNOSIS — R41.9 NEUROCOGNITIVE DISORDER: ICD-10-CM

## 2025-03-21 PROCEDURE — 70450 CT HEAD/BRAIN W/O DYE: CPT

## 2025-03-22 DIAGNOSIS — Q85.9 HAMARTOMA (H): Primary | ICD-10-CM

## 2025-03-22 NOTE — RESULT ENCOUNTER NOTE
Penny Winter    CT brain shows age-related changes.  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  Direct inspection by ENT recommended.  I would recommend seeing ENT and have placed a consult    Regards,  Dez Bermeo MD

## 2025-03-26 ENCOUNTER — TELEPHONE (OUTPATIENT)
Dept: OTOLARYNGOLOGY | Facility: CLINIC | Age: 70
End: 2025-03-26
Payer: COMMERCIAL

## 2025-03-26 NOTE — TELEPHONE ENCOUNTER
Patient confirmed scheduled appointment:     Date: 6/3/25  Time: 2:00pm  Appointment Type: New ENT  Provider: Dr. Perry Camarena   Location: [facility]  Testing/imaging:   Additional Notes:

## 2025-03-28 ENCOUNTER — HOSPITAL ENCOUNTER (OUTPATIENT)
Dept: RADIOLOGY | Facility: HOSPITAL | Age: 70
Discharge: HOME | End: 2025-03-28
Attending: PSYCHIATRY & NEUROLOGY | Admitting: PSYCHIATRY & NEUROLOGY
Payer: COMMERCIAL

## 2025-03-28 VITALS
RESPIRATION RATE: 16 BRPM | DIASTOLIC BLOOD PRESSURE: 82 MMHG | TEMPERATURE: 97.7 F | SYSTOLIC BLOOD PRESSURE: 137 MMHG | OXYGEN SATURATION: 94 % | HEART RATE: 70 BPM

## 2025-03-28 DIAGNOSIS — R41.9 NEUROCOGNITIVE DISORDER: ICD-10-CM

## 2025-03-28 DIAGNOSIS — Z71.89 COUNSELING AND COORDINATION OF CARE: ICD-10-CM

## 2025-03-28 LAB
APPEARANCE CSF: CLEAR
APPEARANCE CSF: CLEAR
COLOR CSF: COLORLESS
COLOR CSF: COLORLESS
GLUCOSE CSF-MCNC: 57 MG/DL (ref 40–70)
Lab: NORMAL
PERFORMING LABORATORY: NORMAL
PROT CSF-MCNC: 20.7 MG/DL (ref 15–45)
RBC # CSF MANUAL: 221 /UL (ref 0–2)
RBC # CSF MANUAL: 83 /UL (ref 0–2)
SPECIMEN STATUS: NORMAL
TEST NAME: NORMAL
TUBE # CSF: 1
TUBE # CSF: 4
WBC # CSF MANUAL: 1 /UL (ref 0–5)
WBC # CSF MANUAL: 2 /UL (ref 0–5)

## 2025-03-28 PROCEDURE — 83520 IMMUNOASSAY QUANT NOS NONAB: CPT

## 2025-03-28 PROCEDURE — 84157 ASSAY OF PROTEIN OTHER: CPT

## 2025-03-28 PROCEDURE — 87205 SMEAR GRAM STAIN: CPT

## 2025-03-28 PROCEDURE — 82945 GLUCOSE OTHER FLUID: CPT

## 2025-03-28 PROCEDURE — 87015 SPECIMEN INFECT AGNT CONCNTJ: CPT

## 2025-03-28 PROCEDURE — 62328 DX LMBR SPI PNXR W/FLUOR/CT: CPT

## 2025-03-28 PROCEDURE — 89050 BODY FLUID CELL COUNT: CPT

## 2025-04-01 LAB — MAYO MISC RESULT: ABNORMAL

## 2025-04-02 LAB
BACTERIA CSF CULT: NO GROWTH
GRAM STAIN RESULT: NORMAL
GRAM STAIN RESULT: NORMAL

## 2025-04-02 NOTE — RESULT ENCOUNTER NOTE
Dear Maggy    Spinal fluid result  not typical for Alzheimer's dementia.  Although there are some abnormal changes these findings could be due to disorder of cerebrospinal fluid flow.  However CT brain previously does not show any enlarged ventricle to suggest there are any cerebrospinal fluid flow abnormality.  At present no intervention is needed.  We will discuss in detail during her follow-up visit.    Regards,  Dez Bermeo MD

## 2025-06-02 ENCOUNTER — TRANSFERRED RECORDS (OUTPATIENT)
Dept: HEALTH INFORMATION MANAGEMENT | Facility: CLINIC | Age: 70
End: 2025-06-02
Payer: COMMERCIAL

## 2025-06-03 ENCOUNTER — OFFICE VISIT (OUTPATIENT)
Dept: OTOLARYNGOLOGY | Facility: CLINIC | Age: 70
End: 2025-06-03
Payer: COMMERCIAL

## 2025-06-03 VITALS
SYSTOLIC BLOOD PRESSURE: 137 MMHG | DIASTOLIC BLOOD PRESSURE: 85 MMHG | OXYGEN SATURATION: 93 % | BODY MASS INDEX: 22.82 KG/M2 | WEIGHT: 137 LBS | HEART RATE: 97 BPM | HEIGHT: 65 IN

## 2025-06-03 DIAGNOSIS — J34.89 NASAL LESION: Primary | ICD-10-CM

## 2025-06-03 PROCEDURE — 88305 TISSUE EXAM BY PATHOLOGIST: CPT | Mod: TC | Performed by: STUDENT IN AN ORGANIZED HEALTH CARE EDUCATION/TRAINING PROGRAM

## 2025-06-03 PROCEDURE — 88305 TISSUE EXAM BY PATHOLOGIST: CPT | Mod: 26 | Performed by: STUDENT IN AN ORGANIZED HEALTH CARE EDUCATION/TRAINING PROGRAM

## 2025-06-03 ASSESSMENT — PAIN SCALES - GENERAL: PAINLEVEL_OUTOF10: NO PAIN (0)

## 2025-06-03 NOTE — LETTER
"6/3/2025       RE: Suzanne Malik  [address] [place] [address]     Dear Colleague,    Thank you for referring your patient, Suzanne Malik, to the [facility] EAR NOSE AND THROAT CLINIC [place] at [facility]. Please see a copy of my visit note below.      [facility] - Rhinology & Skull Base Surgery  New Patient Visit      Encounter date:   Kacie 3, 2025    Referring Provider:  Dez Bermeo MD  [address]  [place] [address]    Assessment, Decision Making, and Plan:  (J34.89) Nasal lesion  (primary encounter diagnosis)  Comment:   --bilateral anterior olfactory cleft/nasofrontal lesions; suspicious for REAH  --nasal congestion, dizziness, desats at night - discussed unclear whether the lesions are responsible for these symptoms or not  Plan:   --biopsy performed today  --follow up pending path  --will get CT from  ENT 8/2023 for comparison    History of Present Illness:   Suzanne Malik is a 70 year old female who presents for consultation regarding nasal lesion.    Starting 6/2023  Was concerned about memory issues  Ultimately had imaging - MRI/CT that showed lesions in the olfactory cleft/anterior nasal vault  Was noticing around the time there is also some oxygen desaturations  Notices dependent nasal congestion     Currently - fatigue, attributes to poor airflow/oxygen  Does notice occasional nasal blockage  Smell hasn't changed  Thick drainage    Tried xhance - 1-2 wks, didn't seem to help; switched to flonase due to cost for another 2 wks  AM/PM uses saline sprays  Seems to have helped night time sats    Known ragweed allergy     Physical Exam:  Vital signs: /85   Pulse 97   Ht 1.657 m (5' 5.25\")   Wt 62.1 kg (137 lb)   LMP  (LMP Unknown)   SpO2 93%   BMI 22.62 kg/m     General Appearance: No acute distress, appropriate demeanor, conversant  Eyes: moist conjunctivae; EOMI; pupils symmetric; visual " acuity grossly intact; no proptosis  Head: normocephalic; overall symmetric appearance without deformity  Face: overall symmetric without deformity  Ears: Normal appearance of external ear; external meatus normal in appearance; TMs intact without perforation bilaterally;   Nose: No external deformity; septum (midline) ; inferior turbinates (non obstructing )   Oral Cavity/oropharynx: Normal appearance of mucosa; tongue midline; no mass or lesions; oropharynx without obvious mucosal abnormality  Neck: no palpable lymphadenopathy; thyroid without palpable nodules  Lungs: symmetric chest rise; no wheezing  CV: Good distal perfusion; normal hear rate  Extremities: No deformity  Neurologic Exam: Cranial nerves II-XII are grossly intact; no focal deficit    Procedure Note  Procedure performed: Rigid nasal endoscopy  Indication: To evaluate for sinonasal pathology not visualized on routine anterior rhinoscopy  Anesthesia: 4% topical lidocaine with 0.05% oxymetazoline  Description of procedure: A 0-degree, 4 mm rigid endoscope was inserted into bilateral nasal cavities and the inferior and middle turbinates, nasal valves, nasal cavity, inferior meatus, middle meatus, sphenoethmoid recess, and nasopharynx were thoroughly evaluated for evidence of obstruction, edema, purulence, polyps and/or mass/lesion.     Findings:    Bilateral papillary appearing lesions of the nasal vault and anterior OC  Mild inferior capillary vascular markings on NBI, superiorly edematous with less marking  Biopsy obtained from the right with through cuts    The patient tolerated the procedure well without complication.     Imaging Review:  MRI 2/2025 - primary review with bilateral homogenous OC lesions contrast enhancing    Perry Camarena MD    Rhinology  Endoscopic Skull Base Surgery  [facility]  Department of Otolaryngology - Head & Neck Surgery          Again, thank you for allowing me to participate in the care  of your patient.      Sincerely,    Perry Camarena MD

## 2025-06-03 NOTE — PATIENT INSTRUCTIONS
You were seen in the ENT Clinic today by Dr. Camarena. If you have any questions or concerns after your appointment, please contact us (see below)    The following has been recommended for you based upon your appointment today:  In clinic right nasal cavity biopsy - we will notify you of results and next steps     Please return to clinic to be determined after biopsy results     How to Contact Us:  Send a [employer] message to your provider. Our team will respond to you via [employer]. Occasionally, we will need to call you to get further information.  For urgent matters (Monday-Friday), call the ENT Clinic: [phone] and speak with a call center team member - they will route your call appropriately.   If you'd like to speak directly with a nurse, please find our contact information below. We do our best to check voicemail frequently throughout the day, and will work to call you back within 1-2 days. For urgent matters, please use the general clinic phone numbers listed above.    Cyn KITCHEN RN, BSN   RN Care Coordinator, ENT Clinic  [facility]  Direct: [phone]  Shelli LASSITER LPN  Direct: [phone]

## 2025-06-04 LAB
PATH REPORT.COMMENTS IMP SPEC: NORMAL
PATH REPORT.COMMENTS IMP SPEC: NORMAL
PATH REPORT.FINAL DX SPEC: NORMAL
PATH REPORT.GROSS SPEC: NORMAL
PATH REPORT.MICROSCOPIC SPEC OTHER STN: NORMAL
PATH REPORT.RELEVANT HX SPEC: NORMAL
PHOTO IMAGE: NORMAL

## 2025-06-17 ENCOUNTER — PREP FOR PROCEDURE (OUTPATIENT)
Dept: OTOLARYNGOLOGY | Facility: CLINIC | Age: 70
End: 2025-06-17

## 2025-06-17 ENCOUNTER — VIRTUAL VISIT (OUTPATIENT)
Dept: OTOLARYNGOLOGY | Facility: CLINIC | Age: 70
End: 2025-06-17
Payer: COMMERCIAL

## 2025-06-17 ENCOUNTER — TELEPHONE (OUTPATIENT)
Dept: OTOLARYNGOLOGY | Facility: CLINIC | Age: 70
End: 2025-06-17

## 2025-06-17 DIAGNOSIS — Q85.9: Primary | ICD-10-CM

## 2025-06-17 RX ORDER — CEFAZOLIN SODIUM 2 G/50ML
2 SOLUTION INTRAVENOUS SEE ADMIN INSTRUCTIONS
OUTPATIENT
Start: 2025-06-17

## 2025-06-17 RX ORDER — DEXAMETHASONE SODIUM PHOSPHATE 4 MG/ML
10 INJECTION, SOLUTION INTRA-ARTICULAR; INTRALESIONAL; INTRAMUSCULAR; INTRAVENOUS; SOFT TISSUE ONCE
OUTPATIENT
Start: 2025-06-17 | End: 2025-06-17

## 2025-06-17 RX ORDER — CEFAZOLIN SODIUM 2 G/50ML
2 SOLUTION INTRAVENOUS
OUTPATIENT
Start: 2025-06-17

## 2025-06-17 NOTE — PROGRESS NOTES
[facility] - Rhinology & Skull Base Surgery  Established Patient Visit      Encounter date:   6/17/25    Assessment, Decision Making, and Plan:  (J34.89) Nasal lesion  (primary encounter diagnosis)  Comment:   --bilateral anterior olfactory cleft/nasofrontal REAH  --we discussed that these lesions may cause nasal obstruction, poor sense of smell, and potentially pressure/fullness  --unclear whether they would be primarily the source of oxygen desaturations  --we also discussed that surgery would not primarily affect her allergic rhinitis, but may help make topical allergy medications more effective by opening the nasal cavity  Plan:   I discussed the risks, benefits, and alternatives to endoscopic sinonasal surgery, including but not limited to: pain, bleeding, infection, CSF leak, orbital injury resulting in vision changes and/or vision loss, septal perforation and/or hematoma, dental hypesthesia, facial numbness, need for continued medical management after surgery, and need for additional procedures, among others. She was allowed to ask questions, which I answered to the best of my ability. After this discussion, surgery was elected.     PAC  New CT for image guidance  CSC  Follow up 1 wk    History of Present Illness:   Suzanne Malik is a 70 year old female who presents for consultation regarding nasal lesion.    Starting 6/2023  Was concerned about memory issues  Ultimately had imaging - MRI/CT that showed lesions in the olfactory cleft/anterior nasal vault  Was noticing around the time there is also some oxygen desaturations  Notices dependent nasal congestion     Currently - fatigue, attributes to poor airflow/oxygen  Does notice occasional nasal blockage  Smell hasn't changed  Thick drainage    Tried xhance - 1-2 wks, didn't seem to help; switched to flonase due to cost for another 2 wks  AM/PM uses saline sprays  Seems to have helped night time sats    Known ragweed allergy     Perry MCCARTY  MD Nicol    Rhinology  Endoscopic Skull Base Surgery  [facility]  Department of Otolaryngology - Head & Neck Surgery    Virtual Visit Details    Type of service:  Video Visit   Video Start Time: 11:35  Video End Time: 12:03p    Originating Location (pt. Location): Home    Distant Location (provider location):  On-site  Platform used for Video Visit: Jamar

## 2025-06-17 NOTE — LETTER
6/17/2025       RE: Suzanne Malik  [address] [place] [address]     Dear Colleague,    Thank you for referring your patient, Suzanne Malik, to the [facility] EAR NOSE AND THROAT CLINIC [place] at [facility]. Please see a copy of my visit note below.      [facility] - Rhinology & Skull Base Surgery  Established Patient Visit      Encounter date:   6/17/25    Assessment, Decision Making, and Plan:  (J34.89) Nasal lesion  (primary encounter diagnosis)  Comment:   --bilateral anterior olfactory cleft/nasofrontal REAH  --we discussed that these lesions may cause nasal obstruction, poor sense of smell, and potentially pressure/fullness  --unclear whether they would be primarily the source of oxygen desaturations  --we also discussed that surgery would not primarily affect her allergic rhinitis, but may help make topical allergy medications more effective by opening the nasal cavity  Plan:   I discussed the risks, benefits, and alternatives to endoscopic sinonasal surgery, including but not limited to: pain, bleeding, infection, CSF leak, orbital injury resulting in vision changes and/or vision loss, septal perforation and/or hematoma, dental hypesthesia, facial numbness, need for continued medical management after surgery, and need for additional procedures, among others. She was allowed to ask questions, which I answered to the best of my ability. After this discussion, surgery was elected.     PAC  New CT for image guidance  CSC  Follow up 1 wk    History of Present Illness:   Suzanne Malik is a 70 year old female who presents for consultation regarding nasal lesion.    Starting 6/2023  Was concerned about memory issues  Ultimately had imaging - MRI/CT that showed lesions in the olfactory cleft/anterior nasal vault  Was noticing around the time there is also some oxygen desaturations  Notices dependent nasal congestion      Currently - fatigue, attributes to poor airflow/oxygen  Does notice occasional nasal blockage  Smell hasn't changed  Thick drainage    Tried xhance - 1-2 wks, didn't seem to help; switched to flonase due to cost for another 2 wks  AM/PM uses saline sprays  Seems to have helped night time sats    Known ragweed allergy     Perry Camarena MD    Rhinology  Endoscopic Skull Base Surgery  [facility]  Department of Otolaryngology - Head & Neck Surgery    Virtual Visit Details    Type of service:  Video Visit   Video Start Time: 11:35  Video End Time: 12:03p    Originating Location (pt. Location): Home    Distant Location (provider location):  On-site  Platform used for Video Visit: Jamar      Again, thank you for allowing me to participate in the care of your patient.      Sincerely,    Perry Camarena MD

## 2025-06-17 NOTE — PATIENT INSTRUCTIONS
You were seen in the ENT Clinic today by Dr. Camarena. If you have any questions or concerns after your appointment, please contact us (see below)    The following has been recommended for you based upon your appointment today:  CT sinus   Surgery schedulers will call you to schedule your surgery, pre op assessment with PAC, and post op follow up with Dr. Camarena     Please return to clinic 1 week after surgery for post op follow up with Dr. Camarena     How to Contact Us:  Send a [employer] message to your provider. Our team will respond to you via [employer]. Occasionally, we will need to call you to get further information.  For urgent matters (Monday-Friday), call the ENT Clinic: [phone] and speak with a call center team member - they will route your call appropriately.   If you'd like to speak directly with a nurse, please find our contact information below. We do our best to check voicemail frequently throughout the day, and will work to call you back within 1-2 days. For urgent matters, please use the general clinic phone numbers listed above.    Cyn KITCHEN RN, BSN   RN Care Coordinator, ENT Clinic  [facility]  Direct: [phone]  Shelli LASSITER LPN  Direct: [phone]           Surgery Instructions - Dr. Camarena     PREPARING FOR SURGERY  You will need a preoperative assessment within 30 days of your surgery. This will be with our PAC clinic and will be scheduled by the surgery schedulers when they call you to schedule your surgery.   Before surgery, call the clinic:   If there is any change in your health, or you are having more frequent or severe symptoms   If you develop a cold or flu, or if you test positive for COVID-19   If you have any questions about what to expect before, during, or after surgery   If you need assistance filling out paperwork for short-term disability or FMLA, or you need a letter excusing you from work         MEDICATIONS BEFORE SURGERY  You will receive specific  instructions about how to take your medications at your preoperative assessment visit. Talk to your care team about every medication that you take, including over-the-counter medications and supplements. Some medications can make you bleed too much during surgery, and some change how well anesthesia drugs work. Follow these general instructions for common medications, unless otherwise directed.      INSTRUCTIONS MEDICATION   Hold for 7 days before surgery  Supplements   Multivitamins   Aspirin (note: some medications can contain aspirin, like Lianet-Eupora)  Naproxen (Aleve)  Ibuprofen (Advil, Motrin)  Any weight loss medication       Talk with the Preoperative Assessment Center (PAC) about how to take these medications before surgery     Insulin or oral medications for diabetes   Blood pressure medications   Anticoagulant medications, including:    warfarin (Coumadin)   enoxaparin (Lovenox)   dabigatran (Pradaxa)   apixaban (Eliquis)   rivaroxaban (Xarelto)   Antiplatelet medications, including:   clopidogrel bisulfate (Plavix)   cilostazol (Pletal)       Okay to keep taking for pain, as needed     Acetaminophen (Tylenol)          EATING AND DRINKING BEFORE SURGERY  Eat and drink as usual until 8 hours before your surgery arrival time. After that, no food or milk.   Drink clear liquids until 1 hour before your surgery arrival time. Clear liquids are liquids you can see through, like water, Gatorade, and Propel. You may also have black coffee and tea (no cream or milk).   Nothing by mouth within 1 hour of your surgery arrival time. This includes gum, candy, and breath mints.   If your care team tells you take medicine on the morning of surgery, it is okay to take medicine with a sip of water.   Do not drink alcohol for at least 24 hours before surgery. Do not use marijuana for 7 days prior to surgery.        PREVENTING INFECTION  Shower or bathe the night before and morning of your surgery following the instructions  on your handout,  Showering Before Surgery.    Note: Only use the special antiseptic soap from your neck to your toes. Your care team will clean the skin at your surgery site.   Don t shave or clip hair near your surgery site. We ll remove the hair if needed.   Having diabetes and/or smoking can cause delayed wound healing and increase your risk of a surgical site infection. Quitting smoking and lowering your blood sugars can make a big difference. If you would like support with this, please let us know or work with your primary care provider.          SMOKING AND NICOTINE  Don t smoke or vape the morning of surgery. You may chew nicotine gum up to 2 hours before surgery. A nicotine patch is okay.   We strongly recommend quitting smoking and other tobacco products. Nicotine can cause delayed healing and wound complications after surgery.         PLANNING AHEAD - FINANCES  [email or web address]  [facility] Billing: Please call [phone], if you wish to pay a bill.  [facility] Financial Counselors: Please call [phone], if you have questions about possible costs and coverage or to discuss options if you don't have enough - or no - insurance for your care.  [facility] Cost of Care Estimates: Please visit the website to view our online estimate tool. If you have additional questions, call [phone] for estimates.  Our financial team will contact your insurance company as soon as surgery is scheduled. If your insurance company requires a prior authorization (pre-approval), our financial team will complete these necessary steps. Typically, we don t encounter many issues with insurance denying coverage for skull base surgery.    It is a good idea to call your insurance company and let them know you re having surgery. Ask questions about your deductible, co-insurance, and what of out-of-pocket costs you will be responsible for.          HEALTHCARE DIRECTIVE  Consider preparing a Health Care Directive and choosing a Health Care Agent. A Health Care Directive is a written plan outlining your values and priorities for your future medical treatment. A Health Care Agent makes health care decisions based on your wishes if you are unable to communicate.   Download a document, information materials or register for a free class on advance care planning and creating a health care directive by visiting [email or web address], calling [phone], or emailing [email or web address].   If you have a health care directive or choose to complete a healthcare directive before surgery, please upload the document to [email or web address]. This will be attached to your chart. You may also want to bring a copy with you on the day of surgery.         YOUR SURGERY DAY  Parking:   Both self-park and  parking are available at the [facility] and [facility] buildings. If the Patient Visitors Ramp is full or if a patient or visitor has limited mobility, please follow the signs to the  located at the main entrance. For more information, please visit: [email or web address]    What to bring:  Photo ID and insurance card   Copy of your healthcare directive (if you have one)   Glasses with case (you can t wear contacts during surgery)   Hearing aids with case   If you have a pacemaker, ICD (defibrillator) or other implant, please bring the ID card   If you have an implanted stimulator, please bring the remote control   If you have a legal guardian, bring a copy of the certified (court-stamped) guardianship papers   What to leave at home:  Please remove any jewelry, including body piercings   Leave jewelry and other valuables at home   Medications/supplements         HOME RECOVERY  Everyone's recovery is different based on their health condition, age, and overall health status.   You will need an adult to drive you home from  the hospital after surgery. Please do not drive for 24 hours after your surgery.   Plan to have an adult stay with you for at least 24 hours after you get home from the hospital.         ACTIVITY RESTRICTIONS  Avoid strenuous exercise and activity until your follow up appointment with Dr. Camarena.  Do not lift anything greater than 10 pounds (about a gallon of milk).       PAIN MANAGEMENT  It is normal to have some pain after surgery. Most people do not experience severe pain. If you are experiencing severe pain please let us know.        CALL THE CLINIC IF YOU EXPERIENCE:  Drainage, swelling, fluid collection, or increased redness around the incision   Fever, or temperature of 101 degrees or higher   Pain not managed by your pain medication   Severe constipation or abdominal pain  Running low on prescription medication that was prescribed to you at the time of surgery   Any other symptoms that you have questions about     After clinic hours or on the weekends, please call the hospital  at [phone] and ask to speak with the ENT resident on call. If you have a serious emergency, call 911 or come to the emergency room. If you can, come to the hospital where you had your surgery.      During clinic hours:   Department  Phone    Ear, Nose, & Throat (ENT)     [phone]      RN Care Coordinators:  We do our best to check voicemail frequently throughout the day. For urgent matters, please use the general clinic phone number listed above. Your call will be routed appropriately.      Cyn KITCHEN RN, BSN   RN Care Coordinator ENT   Direct: [phone]   Shelli PENA LPN   Direct: [phone]

## 2025-06-19 ENCOUNTER — TELEPHONE (OUTPATIENT)
Dept: OTOLARYNGOLOGY | Facility: CLINIC | Age: 70
End: 2025-06-19

## 2025-06-19 ENCOUNTER — OFFICE VISIT (OUTPATIENT)
Dept: NEUROLOGY | Facility: CLINIC | Age: 70
End: 2025-06-19
Payer: COMMERCIAL

## 2025-06-19 VITALS
WEIGHT: 141 LBS | HEIGHT: 65 IN | BODY MASS INDEX: 23.49 KG/M2 | SYSTOLIC BLOOD PRESSURE: 139 MMHG | HEART RATE: 73 BPM | DIASTOLIC BLOOD PRESSURE: 88 MMHG

## 2025-06-19 DIAGNOSIS — R41.9 NEUROCOGNITIVE DISORDER: Primary | ICD-10-CM

## 2025-06-19 PROBLEM — Q85.9: Status: ACTIVE | Noted: 2025-06-17

## 2025-06-19 NOTE — PROGRESS NOTES
NEUROLOGY FOLLOW UP VISIT  NOTE       [facility] NEUROLOGY [place]  [address] [place] [address]  Tel: [phone]  Fax: [phone]  [email or web address]     Suzanne Malik,  1955, MRN [id number]  PCP: Ciara Moe  Date: 2025      ASSESSMENT & PLAN     Visit Diagnosis  Neurocognitive disorder     Neurocognitive disorder  70-year-old female with HTN, HLD who was evaluated for progressive cognitive decline.  She scored 29/30 on MoCA.  Previously neuropsychological testing was also normal.  She had lab work that showed an elevated  and afterwards CSF showed an elevated p-Tau/Abeta42 ratio concurrent decreased Abeta42 and the absence of elevated p-Tau concentration may also be associated with disorders of CSF dynamics, such as in normal pressure hydrocephalus (NPH), which is known to decrease protein levels in CSF. 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    I explained to the patient that although her  spinal fluid is borderline abnormal it is not typical for Alzheimer's dementia and at present I have recommended:    1.  Vitamin E 1000 units twice daily  2.  Remain  physically and mentally active, eat Mediterranean diet  3.  Repeat neuropsychological testing 18 months after the last study on 2024  4.  Follow-up in 1 year and if there is any progression I plan on repeating lumbar puncture.    Thank you again for this referral, please feel free to contact me if you have any questions.    Dez Bermeo MD  [facility]     HISTORY OF PRESENT ILLNESS     Patient is a 70-year-old female with history of HTN, HLD who was referred for evaluation of progressive cognitive decline.  She scored 29/30 on MoCA and had a neuropsychological evaluation on 2024 that was unremarkable. She had lab work that included normal B12 and TSH.   MRI brain and MRA was normal there was signal within the left frontal horn likely representing an artifact. CT brain was ordered that showed atrophy and no left-sided abnormality but incidentally mildly expansile soft tissue lesions opacifying the bilateral olfactory clefts were noted.  She saw ENT and these were felt to be hamartomas and is scheduled for surgery on 6/30/2025     Although patient admitted she is under stress as her parents live in [place] and still independent but  she had to travel there to take care of them but did not think stress was the sole cause for her symptoms.  I had recommended additional lab work that showed a borderline elevated  and subsequently lumbar puncture was done that was not typical for Alzheimer's dementia as although she had elevated p-Tau/Abeta42 ratio concurrent decreased Abeta42 and the absence of elevated p-Tau concentration may also be associated with disorders of CSF dynamics, such as in normal pressure hydrocephalus (NPH), which is known to decrease protein levels in CSF.     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    Surgical History:  She  has a past surgical history that includes VAG HYST,RMV TUBE/OVARY (01/1998); APPENDECTOMY (8  years old); REMOVE TONSILS/ADENOIDS,<9 Y/O; REMOVAL OF TONSILS,<11 Y/O; APPENDECTOMY; TOTAL ABDOM HYSTERECTOMY (1998); Breast Cyst Aspiration (Left, 2008); Breast Cyst Aspiration (Right, 2011); Basal Cell Carcinoma Excision (2014); Hysterectomy; and Oophorectomy.     SOCIAL HISTORY     Reviewed, and she  reports that she has never smoked. She has never been exposed to tobacco smoke. She has never used smokeless tobacco. She reports that she does not use drugs.     FAMILY HISTORY     Reviewed, and family history includes C.A.D. in her father; Cerebrovascular Disease in her maternal grandfather and maternal grandmother; Deep Vein Thrombosis in her father; Heart Disease in her father; Hyperlipidemia in her mother; Hypertension in her father and mother; No Known Problems in her brother; Pulmonary Embolism in her father.     ALLERGIES     Allergies   Allergen Reactions    Ragweeds      Food oral allergy syndrome         REVIEW OF SYSTEMS     A 12 point review of system was performed and was negative except as outlined in the history of present illness.     HOME MEDICATIONS     Current Outpatient Rx   Medication Sig Dispense Refill    Azelastine HCl 137 MCG/SPRAY SOLN SPRAY 1 SPRAY INTO BOTH NOSTRILS DAILY AS NEEDED FOR ALLERGIES 30 mL 2    Calcium-Magnesium-Zinc 333-133-5 MG TABS per tablet Take 1 tablet by mouth daily.      cetirizine (ZYRTEC) 5 MG tablet Take 5 mg by mouth daily.      famotidine (PEPCID) 10 MG tablet Take 10 mg by mouth nightly as needed.      fluticasone furoate 27.5 MCG/SPRAY nasal spray Spray 2 sprays into both nostrils daily.      ibuprofen (ADVIL/MOTRIN) 200 MG tablet Take 200 mg by mouth every 4 hours as needed.      MAGNESIUM CITRATE PO Take by mouth.      meclizine (DRAMAMINE LESS DROWSY) 25 MG tablet Take 25 mg by mouth 3 times daily as needed.      melatonin 3 MG tablet Take 3 mg by mouth nightly as needed.      PATADAY 0.2 % OP SOLN Apply to eye as needed       Probiotic Product (DIGESTIVE  "ADVANTAGE PO) Digestive Spectrum      Probiotic Product (PROBIOTIC PO) Take by mouth daily Low histamine probiotic      UNABLE TO FIND MEDICATION NAME: Susy      vitamin B-Complex Take 1 tablet by mouth daily.      vitamin C (ASCORBIC ACID) 1000 MG TABS Take 1,000 mg by mouth 2 times daily.      Vitamin D3 (D3-1000) 25 mcg (1000 units) tablet Take 1 tablet by mouth 2 times daily.           PHYSICAL EXAM     Vital signs  /88 (BP Location: Right arm, Patient Position: Sitting)   Pulse 73   Ht 1.657 m (5' 5.24\")   Wt 64 kg (141 lb)   LMP  (LMP Unknown)   BMI 23.29 kg/m      Weight:   141 lbs 0 oz    Patient is alert and oriented x4 in no acute distress. Vital signs were reviewed and are documented in electronic medical record. Neck was supple, no carotid bruits, thyromegaly, JVD, or lymphadenopathy was noted.   NEUROLOGY EXAM:   Patient s speech was normal with no aphasia or dysarthria. Mentation, and affect were also normal.    Funduscopic exam was normal, with normal cup to disc ratio. Cranial nerves II -XII were intact.    Patient had normal mass, tone and motor strength was 5/5 in all extremities without pronator drift.    Sensation was intact to light touch, pinprick, and vibratory sensation.    Reflexes were 1+ symmetrical with downgoing toes.    No dysmetria noted on FNF or HKS. Romberg was negative.   Gait testing was normal. Able to walk on toes/heels. Tandem walk normal.     PERTINENT DIAGNOSTIC STUDIES     Following studies were reviewed:     CT BRAIN 3/21/2025  1.  No acute intracranial hemorrhage, extra-axial fluid collection, or mass effect.  2.  Brain atrophy and presumed chronic small vessel ischemic changes, as described.  3.  Nonspecific mildly expansile soft tissue lesions opacifying the bilateral olfactory clefts, as described. 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 Recommend correlation with direct inspection.    MRI, MRA " BRAIN 12/25/2025  HEAD MRI:   1.  No finding for acute infarct, intracranial hemorrhage, or abnormally enhancing mass.     2.  Signal within the left frontal horn likely reflects flow related artifact, though this shows up on multiple sequences. As such, intraventricular mass is not completely excluded. Head CT would be of benefit in further assessment.     3.  Background mild age-related changes as above.     HEAD MRA:   1.  No significant intracranial stenosis. No aneurysm and no high flow vascular malformation.     CAROTID ULTRASOUND 2/12/2025  1.  Mild plaque formation, velocities consistent with less than 50% stenosis in the right internal carotid artery.  2.  Mild plaque formation, velocities consistent with less than 50% stenosis in the left internal carotid artery.  3.  Flow within the vertebral arteries is antegrade.     ZIO PATCH 11/13/2024  ? Borderline multiday cardiac patch monitor by virtue of the presence of modestly increased PVCs.  It is not entirely clear whether the patient's ventricular ectopy is the sole cause of her symptoms as some tracings demonstrated sinus rhythm alone and other tracings and extremely rare ventricular ectopy.  ? No sustained atrial or ventricular tachyarrhythmia.  ? No profound bradycardia or significant/symptomatic pauses.     NEUROPSYCHOLOGICAL EVALUATION 9/16/2024  Ms. Malik demonstrated mild weaknesses and variability that raise the question of subtle dysfunction of subcortical systems. She does not meet criteria for a neurocognitive diagnosis (e.g., dementia or mild cognitive impairments). Speculatively, given her medical history, these findings could be explained by cerebrovascular changes. However, factors such as anxiety, hypoxemia during sleep, and normal variability could be contributing. On evaluation, Ms. Malik demonstrated mild variability in processing speed and verbal fluency. Her processing speed abilities ranged from high average to low average, while  verbal fluency abilities ranged from average to low average. There is also some variability in executive functioning. Nonverbal reasoning was a personal strength. The remainder of her cognitive abilities were normal and performed in keeping with her generally high average range cognitive baseline. She reported minimal symptoms of depression.      RECOMMENDATIONS  Preliminary results and recommendations were provided to Ms. Malik via telephone on 9/16/2024, and all her questions were answered.       A referral for brain imaging (e.g., MRI) may be considered to quantify atrophy or vascular burden and to rule out an emerging cerebrovascular process.     Ms. Malik reported a history of possible hypoxemia during sleep on today's evaluation. She completed a sleep clinic consult on 12/1/2023 but did not undergo a formal sleep study. If medically indicated, she may consider a second opinion to rule out an underlying sleep disorder.       Ms. Malik is encouraged to sustain a physically active lifestyle, as able and in consultation with her medical providers. Regular exercise at a mild to moderate level is the best lifestyle tool we have for maintaining cognitive health as we age.      She should continue management of her chronic vascular conditions (e.g., hypertension, hyperlipidemia) through regular follow-up with her medical providers.       If she continues to have difficulties with memory, she may benefit from routine use of a memory notebook or other assistive device. Additionally, she may incorporate mindfulness techniques into her daily routine by paying mindful attention to and making a conscious effort to learn new information.      A neuropsychological re-evaluation is recommended in approximately 1 year to assess for cognitive changes and to update recommendations, as needed. The current results can be used as a baseline at that time.     ADEVL; Alzheimer Disease Evaluation, Spinal Fluid [facility] Miscellaneous  "Test (03/28/2025 8:59 AM)      PERTINENT LABS  Following labs were reviewed:  Office Visit on 06/03/2025   Component Date Value Ref Range Status    Case Report 06/03/2025    Final                    Value:Surgical Pathology Report                         Case: [id number]                                  Authorizing Provider:  Perry Camarena MD          Collected:           06/03/2025 02:27 PM          Ordering Location:     [facility] Ear Nose Received:            06/03/2025 02:45 PM                                 [facility]                                                                                   [place]                                                                  Pathologist:           Vandana Pascual MD                                                                           Specimen:    Nose, right nasal cavity                                                                   Final Diagnosis 06/03/2025    Final                    Value:A. 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 Nose, right nasal cavity:  The specimen is received in formalin with proper patient identification labeled \" right nasal cavity\".  The specimen consists of 3 pieces of tan-red soft tissue, ranging from 0.3-0.4 cm in greatest dimension.  Entirely submitted in cassette A1.        Microscopic Description 06/03/2025    Final                    Value:Microscopic examination was performed.    I have personally reviewed all specimens and or slides, including the listed special stains, and used them with my medical judgement " to determine the final diagnosis.        Performing Labs 06/03/2025    Final                    Value:The technical component of this testing was completed at [facility].    Stain controls for all stains resulted within this report have been reviewed and show appropriate reactivity.      Hospital Outpatient Visit on 03/28/2025   Component Date Value Ref Range Status    Specimen Status 03/28/2025 Specimen received. Reordered and sent to performing laboratory. Report to follow upon completion.   Final    Performing Laboratory 03/28/2025 [facility]   Final    Test Name 03/28/2025 Alzheimer Disease Evaluation, Spinal Fluid   Final    Test Code 03/28/2025 ADEVL   Final    Culture 03/28/2025 No Growth   Final    Gram Stain Result 03/28/2025 No organisms seen   Final    Gram Stain Result 03/28/2025 1+ WBC seen   Final    Glucose CSF 03/28/2025 57  40 - 70 mg/dL Final    Protein total CSF 03/28/2025 20.7  15.0 - 45.0 mg/dL Final    Tube Number 03/28/2025 1   Final    Color 03/28/2025 Colorless  Colorless Final    Clarity 03/28/2025 Clear  Clear Final    Total Nucleated Cells 03/28/2025 2  0 - 5 /uL Final    RBC Count 03/28/2025 83 (H)  0 - 2 /uL Final    Tube Number 03/28/2025 4   Final    Color 03/28/2025 Colorless  Colorless Final    Clarity 03/28/2025 Clear  Clear Final    Total Nucleated Cells 03/28/2025 1  0 - 5 /uL Final    RBC Count 03/28/2025 221 (H)  0 - 2 /uL Final    Hnuter Result 03/28/2025 SEE NOTE (A)   Final         Total time spent for face to face visit, reviewing labs/imaging studies, counseling and coordination of care was: 30 Minutes spent on the date of the encounter doing chart review, review of outside records, review of test results, interpretation of tests, patient visit, and documentation     The longitudinal plan of care for the diagnosis(es)/condition(s) as documented were addressed during this visit. Due to the added  complexity in care, I will continue to support Maggy in the subsequent management and with ongoing continuity of care.    This note was dictated using voice recognition software.  Any grammatical or context distortions are unintentional and inherent to the software.    No orders of the defined types were placed in this encounter.     New Prescriptions    No medications on file     Modified Medications    No medications on file

## 2025-06-19 NOTE — TELEPHONE ENCOUNTER
Received call to schedule surgery with: Dr. Camarena    Spoke with: Maggy     Date of Surgery: 6/30    Estimated Arrival time Discussed with Patient:  5:45 AM or later    Location of surgery: [facility]    Pre-operative H&P: video PAC visit 6/25 at 1:45 PM     Post-Op Appt Date with Surgeon: 7 day post-op needed, patient aware appointment date/time will be sent via [employer]      Imaging needed:  per patient, CT on 6/25 at 11:30 AM    Discussed with patient PAC RN will provide arrival time and instructions for surgery at the time of the appointment: [[facility] locations only]: Yes    Standard Surgery Packet: Yes 6/19/25 via [employer] Message      Additional Comments: N/A      All patients questions were answered and was instructed to contact the clinic with any questions or concerns.     Janay Erazo on 6/19/2025 at 11:17 AM

## 2025-06-19 NOTE — NURSING NOTE
Chief Complaint   Patient presents with    Results     Patient here to discuss LP and CT results     Bettye Romo CMA on 6/19/2025 at 1:22 PM

## 2025-06-19 NOTE — TELEPHONE ENCOUNTER
Left message to schedule surgery with Dr. Camarena.  Writer left call back number [phone] on the patients voicemail.     Janay Armstrong on 6/19/2025 at 9:00 AM

## 2025-06-19 NOTE — LETTER
2025      Suzanne Malik  [address] [place] [address]      Dear Colleague,    Thank you for referring your patient, Suzanne Malik, to the [facility] [place]. Please see a copy of my visit note below.    NEUROLOGY FOLLOW UP VISIT  NOTE       [facility] [place]  [address] [place] [address]  Tel: [phone]  Fax: [phone]  [email or web address]     Suzanne Malik,  1955, MRN [id number]  PCP: Ciara Moe  Date: 2025      ASSESSMENT & PLAN     Visit Diagnosis  Neurocognitive disorder     Neurocognitive disorder  70-year-old female with HTN, HLD who was evaluated for progressive cognitive decline.  She scored 29/30 on MoCA.  Previously neuropsychological testing was also normal.  She had lab work that showed an elevated  and afterwards CSF showed an elevated p-Tau/Abeta42 ratio concurrent decreased Abeta42 and the absence of elevated p-Tau concentration may also be associated with disorders of CSF dynamics, such as in normal pressure hydrocephalus (NPH), which is known to decrease protein levels in CSF. 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    I explained to the patient that although her  spinal fluid is borderline abnormal it is not typical for Alzheimer's dementia and at present I have recommended:    1.  Vitamin E 1000 units twice daily  2.  Remain  physically and mentally active, eat Mediterranean diet  3.  Repeat neuropsychological testing 18 months after the last study on 2024  4.  Follow-up in 1 year and if there is any progression I plan on repeating lumbar puncture.    Thank you again for this referral, please feel free to contact me if you have any questions.    Dez Bermeo MD  [facility]     HISTORY OF PRESENT ILLNESS     Patient is a 70-year-old  female with history of HTN, HLD who was referred for evaluation of progressive cognitive decline.  She scored 29/30 on MoCA and had a neuropsychological evaluation on 9/16/2024 that was unremarkable. She had lab work that included normal B12 and TSH.  MRI brain and MRA was normal there was signal within the left frontal horn likely representing an artifact. CT brain was ordered that showed atrophy and no left-sided abnormality but incidentally mildly expansile soft tissue lesions opacifying the bilateral olfactory clefts were noted.  She saw ENT and these were felt to be hamartomas and is scheduled for surgery on 6/30/2025     Although patient admitted she is under stress as her parents live in [place] and still independent but  she had to travel there to take care of them but did not think stress was the sole cause for her symptoms.  I had recommended additional lab work that showed a borderline elevated  and subsequently lumbar puncture was done that was not typical for Alzheimer's dementia as although she had elevated p-Tau/Abeta42 ratio concurrent decreased Abeta42 and the absence of elevated p-Tau concentration may also be associated with disorders of CSF dynamics, such as in normal pressure hydrocephalus (NPH), which is known to decrease protein levels in CSF.     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    Surgical History:  She  has a past surgical history that includes VAG HYST,RMV TUBE/OVARY (01/1998); APPENDECTOMY (8 years old); REMOVE TONSILS/ADENOIDS,<11 Y/O; REMOVAL OF TONSILS,<11 Y/O; APPENDECTOMY; TOTAL ABDOM HYSTERECTOMY (1998); Breast Cyst Aspiration (Left, 2008); Breast Cyst Aspiration (Right, 2011); Basal Cell Carcinoma Excision (2014); Hysterectomy; and Oophorectomy.     SOCIAL HISTORY     Reviewed, and she  reports that she has never smoked. She has never been exposed to tobacco smoke. She has never used smokeless tobacco. She reports that she does not use drugs.     FAMILY HISTORY     Reviewed, and family history includes C.A.D. in her father; Cerebrovascular Disease in her maternal grandfather and maternal grandmother; Deep Vein Thrombosis in her father; Heart Disease in her father; Hyperlipidemia in her mother; Hypertension in her father and mother; No Known Problems in her brother; Pulmonary Embolism in her father.     ALLERGIES     Allergies   Allergen Reactions     Ragweeds      Food oral allergy syndrome         REVIEW OF SYSTEMS     A 12 point review of system was performed and was negative except as outlined in the history of present illness.     HOME MEDICATIONS     Current Outpatient Rx   Medication Sig Dispense Refill     Azelastine HCl 137 MCG/SPRAY SOLN SPRAY 1 SPRAY INTO BOTH NOSTRILS DAILY AS NEEDED FOR ALLERGIES 30 mL 2     Calcium-Magnesium-Zinc 333-133-5 MG TABS per tablet Take 1 tablet by mouth daily.       cetirizine (ZYRTEC) 5 MG tablet Take 5 mg by mouth daily.       famotidine (PEPCID) 10 MG tablet Take 10 mg by mouth nightly as needed.       fluticasone furoate 27.5 MCG/SPRAY nasal spray Spray 2 sprays into both nostrils daily.       ibuprofen (ADVIL/MOTRIN) 200 MG tablet Take 200 mg by mouth every 4 hours as needed.       MAGNESIUM CITRATE  "PO Take by mouth.       meclizine (DRAMAMINE LESS DROWSY) 25 MG tablet Take 25 mg by mouth 3 times daily as needed.       melatonin 3 MG tablet Take 3 mg by mouth nightly as needed.       PATADAY 0.2 % OP SOLN Apply to eye as needed        Probiotic Product (DIGESTIVE ADVANTAGE PO) Digestive Spectrum       Probiotic Product (PROBIOTIC PO) Take by mouth daily Low histamine probiotic       UNABLE TO FIND MEDICATION NAME: HistaResist       vitamin B-Complex Take 1 tablet by mouth daily.       vitamin C (ASCORBIC ACID) 1000 MG TABS Take 1,000 mg by mouth 2 times daily.       Vitamin D3 (D3-1000) 25 mcg (1000 units) tablet Take 1 tablet by mouth 2 times daily.           PHYSICAL EXAM     Vital signs  /88 (BP Location: Right arm, Patient Position: Sitting)   Pulse 73   Ht 1.657 m (5' 5.24\")   Wt 64 kg (141 lb)   LMP  (LMP Unknown)   BMI 23.29 kg/m      Weight:   141 lbs 0 oz    Patient is alert and oriented x4 in no acute distress. Vital signs were reviewed and are documented in electronic medical record. Neck was supple, no carotid bruits, thyromegaly, JVD, or lymphadenopathy was noted.   NEUROLOGY EXAM:    Patient s speech was normal with no aphasia or dysarthria. Mentation, and affect were also normal.     Funduscopic exam was normal, with normal cup to disc ratio. Cranial nerves II -XII were intact.     Patient had normal mass, tone and motor strength was 5/5 in all extremities without pronator drift.     Sensation was intact to light touch, pinprick, and vibratory sensation.     Reflexes were 1+ symmetrical with downgoing toes.     No dysmetria noted on FNF or HKS. Romberg was negative.    Gait testing was normal. Able to walk on toes/heels. Tandem walk normal.     PERTINENT DIAGNOSTIC STUDIES     Following studies were reviewed:     CT BRAIN 3/21/2025  1.  No acute intracranial hemorrhage, extra-axial fluid collection, or mass effect.  2.  Brain atrophy and presumed chronic small vessel ischemic changes, " as described.  3.  Nonspecific mildly expansile soft tissue lesions opacifying the bilateral olfactory clefts, as described. 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 Recommend correlation with direct inspection.    MRI, MRA BRAIN 12/25/2025  HEAD MRI:   1.  No finding for acute infarct, intracranial hemorrhage, or abnormally enhancing mass.     2.  Signal within the left frontal horn likely reflects flow related artifact, though this shows up on multiple sequences. As such, intraventricular mass is not completely excluded. Head CT would be of benefit in further assessment.     3.  Background mild age-related changes as above.     HEAD MRA:   1.  No significant intracranial stenosis. No aneurysm and no high flow vascular malformation.     CAROTID ULTRASOUND 2/12/2025  1.  Mild plaque formation, velocities consistent with less than 50% stenosis in the right internal carotid artery.  2.  Mild plaque formation, velocities consistent with less than 50% stenosis in the left internal carotid artery.  3.  Flow within the vertebral arteries is antegrade.     ZIO PATCH 11/13/2024  ? Borderline multiday cardiac patch monitor by virtue of the presence of modestly increased PVCs.  It is not entirely clear whether the patient's ventricular ectopy is the sole cause of her symptoms as some tracings demonstrated sinus rhythm alone and other tracings and extremely rare ventricular ectopy.  ? No sustained atrial or ventricular tachyarrhythmia.  ? No profound bradycardia or significant/symptomatic pauses.     NEUROPSYCHOLOGICAL EVALUATION 9/16/2024  Ms. Malik demonstrated mild weaknesses and variability that raise the question of subtle dysfunction of subcortical systems. She does not meet criteria for a neurocognitive diagnosis (e.g., dementia or mild cognitive impairments). Speculatively, given her medical history, these findings could be explained by cerebrovascular changes.  However, factors such as anxiety, hypoxemia during sleep, and normal variability could be contributing. On evaluation, Ms. Malik demonstrated mild variability in processing speed and verbal fluency. Her processing speed abilities ranged from high average to low average, while verbal fluency abilities ranged from average to low average. There is also some variability in executive functioning. Nonverbal reasoning was a personal strength. The remainder of her cognitive abilities were normal and performed in keeping with her generally high average range cognitive baseline. She reported minimal symptoms of depression.      RECOMMENDATIONS  Preliminary results and recommendations were provided to Ms. Malik via telephone on 9/16/2024, and all her questions were answered.       A referral for brain imaging (e.g., MRI) may be considered to quantify atrophy or vascular burden and to rule out an emerging cerebrovascular process.     Ms. Malik reported a history of possible hypoxemia during sleep on today's evaluation. She completed a sleep clinic consult on 12/1/2023 but did not undergo a formal sleep study. If medically indicated, she may consider a second opinion to rule out an underlying sleep disorder.       Ms. Malik is encouraged to sustain a physically active lifestyle, as able and in consultation with her medical providers. Regular exercise at a mild to moderate level is the best lifestyle tool we have for maintaining cognitive health as we age.      She should continue management of her chronic vascular conditions (e.g., hypertension, hyperlipidemia) through regular follow-up with her medical providers.       If she continues to have difficulties with memory, she may benefit from routine use of a memory notebook or other assistive device. Additionally, she may incorporate mindfulness techniques into her daily routine by paying mindful attention to and making a conscious effort to learn new information.      A  "neuropsychological re-evaluation is recommended in approximately 1 year to assess for cognitive changes and to update recommendations, as needed. The current results can be used as a baseline at that time.     ADEVL; Alzheimer Disease Evaluation, Spinal Fluid [place] Miscellaneous Test (03/28/2025 8:59 AM)      PERTINENT LABS  Following labs were reviewed:  Office Visit on 06/03/2025   Component Date Value Ref Range Status     Case Report 06/03/2025    Final                    Value:Surgical Pathology Report                         Case: [id number]                                  Authorizing Provider:  Perry Camarena MD          Collected:           06/03/2025 02:27 PM          Ordering Location:     [facility] Ear Nose Received:            06/03/2025 02:45 PM                                 and [facility] Clinic                                                                                   [place]                                                                  Pathologist:           Vandana Pascual MD                                                                           Specimen:    Nose, right nasal cavity                                                                    Final Diagnosis 06/03/2025    Final                    Value:A. 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 Nose, right nasal cavity:  The specimen is received in formalin with proper patient identification labeled \" right nasal cavity\".  The specimen consists of 3 pieces of tan-red soft tissue, ranging from 0.3-0.4 cm in greatest dimension.  " Entirely submitted in cassette A1.         Microscopic Description 06/03/2025    Final                    Value:Microscopic examination was performed.    I have personally reviewed all specimens and or slides, including the listed special stains, and used them with my medical judgement to determine the final diagnosis.         Performing Labs 06/03/2025    Final                    Value:The technical component of this testing was completed at [facility].    Stain controls for all stains resulted within this report have been reviewed and show appropriate reactivity.      Hospital Outpatient Visit on 03/28/2025   Component Date Value Ref Range Status     Specimen Status 03/28/2025 Specimen received. Reordered and sent to performing laboratory. Report to follow upon completion.   Final     Performing Laboratory 03/28/2025 [facility]   Final     Test Name 03/28/2025 Alzheimer Disease Evaluation, Spinal Fluid   Final     Test Code 03/28/2025 ADEVL   Final     Culture 03/28/2025 No Growth   Final     Gram Stain Result 03/28/2025 No organisms seen   Final     Gram Stain Result 03/28/2025 1+ WBC seen   Final     Glucose CSF 03/28/2025 57  40 - 70 mg/dL Final     Protein total CSF 03/28/2025 20.7  15.0 - 45.0 mg/dL Final     Tube Number 03/28/2025 1   Final     Color 03/28/2025 Colorless  Colorless Final     Clarity 03/28/2025 Clear  Clear Final     Total Nucleated Cells 03/28/2025 2  0 - 5 /uL Final     RBC Count 03/28/2025 83 (H)  0 - 2 /uL Final     Tube Number 03/28/2025 4   Final     Color 03/28/2025 Colorless  Colorless Final     Clarity 03/28/2025 Clear  Clear Final     Total Nucleated Cells 03/28/2025 1  0 - 5 /uL Final     RBC Count 03/28/2025 221 (H)  0 - 2 /uL Final     Hunter Result 03/28/2025 SEE NOTE (A)   Final         Total time spent for face to face visit, reviewing labs/imaging studies, counseling and coordination of care was: 30  Minutes spent on the date of the encounter doing chart review, review of outside records, review of test results, interpretation of tests, patient visit, and documentation     The longitudinal plan of care for the diagnosis(es)/condition(s) as documented were addressed during this visit. Due to the added complexity in care, I will continue to support Maggy in the subsequent management and with ongoing continuity of care.    This note was dictated using voice recognition software.  Any grammatical or context distortions are unintentional and inherent to the software.    No orders of the defined types were placed in this encounter.     New Prescriptions    No medications on file     Modified Medications    No medications on file                 Again, thank you for allowing me to participate in the care of your patient.        Sincerely,        Dez Bermeo MD    Electronically signed

## 2025-06-24 NOTE — TELEPHONE ENCOUNTER
FUTURE VISIT INFORMATION      SURGERY INFORMATION:  Date: 25  Location: [facility] [place]  Surgeon:  Perry Camarena MD   Anesthesia Type:  General  Procedure: Image guided bilateral resection of nasal cavity tumor   Consult: 25    RECORDS REQUESTED FROM:       Primary Care Provider: Ciara Moe MD    Pertinent Medical History: HTN, Intestinal disaccharidase deficiencies and disaccharide, Hypoxemia associated with sleep  malabsorption, HLD    Most recent EKG+ Tracin23, internal

## 2025-06-25 ENCOUNTER — VIRTUAL VISIT (OUTPATIENT)
Dept: SURGERY | Facility: CLINIC | Age: 70
End: 2025-06-25
Payer: COMMERCIAL

## 2025-06-25 ENCOUNTER — ANESTHESIA EVENT (OUTPATIENT)
Dept: SURGERY | Facility: AMBULATORY SURGERY CENTER | Age: 70
End: 2025-06-25
Payer: COMMERCIAL

## 2025-06-25 ENCOUNTER — HOSPITAL ENCOUNTER (OUTPATIENT)
Dept: CT IMAGING | Facility: CLINIC | Age: 70
Discharge: HOME | End: 2025-06-25
Attending: STUDENT IN AN ORGANIZED HEALTH CARE EDUCATION/TRAINING PROGRAM
Payer: COMMERCIAL

## 2025-06-25 ENCOUNTER — PRE VISIT (OUTPATIENT)
Dept: SURGERY | Facility: CLINIC | Age: 70
End: 2025-06-25

## 2025-06-25 VITALS — BODY MASS INDEX: 22.99 KG/M2 | WEIGHT: 138 LBS | HEIGHT: 65 IN

## 2025-06-25 DIAGNOSIS — Z01.818 PRE-OP EVALUATION: Primary | ICD-10-CM

## 2025-06-25 DIAGNOSIS — Q85.9: ICD-10-CM

## 2025-06-25 PROCEDURE — 70486 CT MAXILLOFACIAL W/O DYE: CPT

## 2025-06-25 PROCEDURE — 98001 SYNCH AUDIO-VIDEO NEW LOW 30: CPT | Performed by: PHYSICIAN ASSISTANT

## 2025-06-25 ASSESSMENT — PAIN SCALES - GENERAL: PAINLEVEL_OUTOF10: NO PAIN (0)

## 2025-06-25 ASSESSMENT — ENCOUNTER SYMPTOMS: SEIZURES: 0

## 2025-06-25 ASSESSMENT — LIFESTYLE VARIABLES: TOBACCO_USE: 0

## 2025-06-25 NOTE — PROGRESS NOTES
Maggy is a 70 year old who is being evaluated via a billable video visit.    How would you like to obtain your AVS? MyChart  If the video visit is dropped, the invitation should be resent by: Text to cell phone: [phone]      Alize Winter is a 70 year old, presenting for the following health issues:  Pre-Op Exam      HPI          Physical Exam

## 2025-06-25 NOTE — PATIENT INSTRUCTIONS
Preparing for Your Surgery      Name:  Suzanne Malik   MRN:  [id number]   :  1955   Today's Date:  2025       [employer] [place] [employer] [address] [employer] Project                                Timeline 2025 -2025    This project will affect travel to the [facility], as well as the [facility].      Please check the [employer] project website for the most up to date information and give yourself additional time to reach your destination.      Arriving for surgery:  Surgery date:  2025  Arrival time:  5:45 am    Restrictions due to COVID 19:    Please maintain social distance.  Masking is optional.      parking is available for anyone with mobility limitations or disabilities. (Monday- Friday 7 am- 5 pm)    Please come to:    [facility]  [address] [place] [phone]    Please check in on the 5th floor at the Ambulatory Surgery Center.      What can I eat or drink?    -  You may eat and drink normally until 8 hours prior to arrival  time. (Until 9:45 pm on 2025)  -  You may have clear liquids until 2 hours prior to arrival  time. (Until 3:45 am)    Examples of clear liquids:  Water  Clear broth  Juices (apple, white grape, white cranberry  and cider) without pulp  Noncarbonated, powder based beverages  (lemonade and Missael-Aid)  Sodas (Sprite, 7-Up, ginger ale and seltzer)  Coffee or tea (without milk or cream)  Gatorade      Which medicines can I take?    Hold Aspirin for 7 days before surgery.   Hold Multivitamins for 7 days before surgery.  **Hold Supplements for 7 days before surgery, including vitamin E. Last dose is 2025.   **Hold Ibuprofen (Advil, Motrin) for 1 day before surgery--unless otherwise directed by surgeon.  Hold Naproxen (Aleve) for 4 days before surgery.    Take evening/bedtime medications as prescribed the evening before surgery.      No  alcohol or cannabis products for 24 hours prior to procedure.      -  DO NOT take the following medications the day of surgery:    Calcium-magnesium-Zinc  Cetirizine (Zyrtec)   Magnesium Citrate  Meclizine (Dramamine)  Probiotic   Vitamin B-Complex  Vitamin C (Ascorbic Acid)   Cholecalciferol (Vitamin D3)         -  PLEASE TAKE the following medications the day of surgery:     Nasal spays, if needed  Eye drops, if needed  Famotidine (Pepcid)         How do I prepare myself?  - Please take 2 showers (one the night prior to surgery and one the morning of surgery) using Scrubcare or Hibiclens soap.    Use this soap only from the neck to your toes. Avoid genital area      Leave the soap on your skin for one minute--then rinse thoroughly.      You may use your own shampoo and conditioner. No other hair products.   - Please remove all jewelry and body piercings.  - No lotions, deodorants or fragrance.  - No makeup or fingernail polish.   - Bring your ID and insurance card.    -If you have a Deep Brain Stimulator, a Spinal Cord Stimulator, or any implanted Neuro Device, you must bring the remote to the Surgery Center.         ALL PATIENTS ARE REQUIRED TO HAVE A RESPONSIBLE ADULT TO DRIVE AND BE IN ATTENDANCE WITH THEM FOR 24 HOURS FOLLOWING SURGERY.     Covid testing policy as of 12/06/2022  Your surgeon will notify and schedule you for a COVID test if one is needed before surgery--please direct any questions or COVID symptoms to your surgeon      Questions or Concerns:    -For questions regarding the day of surgery, please contact the Ambulatory Surgery Center at [phone].    -If you have health changes between today and your surgery, please contact your surgeon.     - For questions after surgery, please contact your surgeon's office.

## 2025-06-25 NOTE — H&P (VIEW-ONLY)
Pre-Operative H & P     CC:  Preoperative exam to assess for increased cardiopulmonary risk while undergoing surgery and anesthesia.    Date of Encounter: 6/25/2025  Primary Care Physician:  Ciara Moe     Reason for visit:   Encounter Diagnosis   Name Primary?    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 Malik has a known nasal lesion. She was evaluated by Dr. Camarena and was found to have a hamartoma. She is now scheduled for the above procedure.     History is obtained from the patient and chart review    Hx of abnormal bleeding or anti-platelet use: denies    Menstrual history: No LMP recorded (lmp unknown). Patient has had a hysterectomy.    Past Medical History  Past Medical History:   Diagnosis Date    Allergy, unspecified not elsewhere classified     Basal cell cancer 2014    Breast cyst 2008    Left breast    Breast cyst 2011    Right breast    Diffuse cystic mastopathy     Essential hypertension, benign     Intestinal disaccharidase deficiencies and disaccharide malabsorption     Migraine, unspecified, without mention of intractable migraine without mention of status migrainosus     Unspecified menopausal and postmenopausal disorder        Past Surgical History  Past Surgical History:   Procedure Laterality Date    BASAL CELL  CARCINOMA EXCISION  01/01/2014    left shoulder    BREAST CYST ASPIRATION Left 01/01/2008    BREAST CYST ASPIRATION Right 01/01/2011    HC REMOVAL OF TONSILS,<9 Y/O      Description: Tonsillectomy;  Proc Date: 01/01/1957;    HYSTERECTOMY      OOPHORECTOMY      ZC APPENDECTOMY      Description: Appendectomy;  Proc Date: 01/01/1963;    ZZC TOTAL ABDOM HYSTERECTOMY  01/01/1998    for adenomyosis    Z VAG HYST,RMV TUBE/OVARY  01/01/1998       Prior to Admission Medications  Current Outpatient Medications   Medication Sig Dispense Refill    Azelastine HCl 137 MCG/SPRAY SOLN SPRAY 1 SPRAY INTO BOTH NOSTRILS DAILY AS NEEDED FOR ALLERGIES 30 mL 2    Calcium-Magnesium-Zinc 333-133-5 MG TABS per tablet Take 1 tablet by mouth daily.      cetirizine (ZYRTEC) 5 MG tablet Take 5 mg by mouth daily.      famotidine (PEPCID) 10 MG tablet Take 10 mg by mouth nightly as needed.      fluticasone furoate 27.5 MCG/SPRAY nasal spray Spray 2 sprays into both nostrils daily.      ibuprofen (ADVIL/MOTRIN) 200 MG tablet Take 200 mg by mouth every 4 hours as needed.      meclizine (DRAMAMINE LESS DROWSY) 25 MG tablet Take 25 mg by mouth 3 times daily as needed.      melatonin 3 MG tablet Take 3 mg by mouth nightly as needed.      PATADAY 0.2 % OP SOLN Apply to eye as needed       Probiotic Product (DIGESTIVE ADVANTAGE PO) Take 1 tablet by mouth at bedtime. Digestive Spectrum      UNABLE TO FIND MEDICATION NAME: HistaResist      vitamin B-Complex Take 1 tablet by mouth every morning.      vitamin C (ASCORBIC ACID) 1000 MG TABS Take 1,000 mg by mouth every morning.      Vitamin D3 (D3-1000) 25 mcg (1000 units) tablet Take 1 tablet by mouth every morning.      Vitamin Mixture (VITAMIN E COMPLETE PO) Take 600 mg by mouth 2 times daily.      MAGNESIUM CITRATE PO Take by mouth. (Patient not taking: Reported on 6/25/2025)         Allergies  Allergies   Allergen Reactions    Ragweeds      Food oral allergy syndrome       Social History  Social  History     Socioeconomic History    Marital status:      Spouse name: Not on file    Number of children: 0    Years of education: Not on file    Highest education level: Not on file   Occupational History    Occupation:      Comment: Losing job in 1 yr   Tobacco Use    Smoking status: Never     Passive exposure: Never    Smokeless tobacco: Never   Vaping Use    Vaping status: Never Used   Substance and Sexual Activity    Alcohol use: Not Currently    Drug use: No    Sexual activity: Not Currently   Other Topics Concern     Service No    Blood Transfusions No    Caffeine Concern No    Occupational Exposure No    Hobby Hazards No    Sleep Concern No    Stress Concern Yes    Weight Concern No    Special Diet No    Back Care No    Exercise Yes     Comment: weights 2 x per week, aerobics 3 x per week    Bike Helmet Yes    Seat Belt Yes    Self-Exams Yes   Social History Narrative    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  She lives alone and does not have children and enjoys outdoor activities- paddle boarding at [place]     Social Drivers of Health     Financial Resource Strain: Low Risk  (12/14/2024)    Financial Resource Strain     Within the past 12 months, have you or your family members you live with been unable to get utilities (heat, electricity) when it was really needed?: No   Food Insecurity: Low Risk  (12/14/2024)    Food Insecurity     Within the past 12 months, did you worry that your food would run out before you got money to buy more?: No     Within the past 12 months, did the food you bought just not last and you didn t have money to get more?: No   Transportation Needs: Low Risk  (12/14/2024)    Transportation Needs     Within the past 12 months, has lack of transportation kept you from medical appointments, getting your medicines, non-medical meetings or appointments, work, or from getting  things that you need?: No   Physical Activity: Sufficiently Active (12/14/2024)    Exercise Vital Sign     Days of Exercise per Week: 5 days     Minutes of Exercise per Session: 40 min   Stress: Stress Concern Present (12/14/2024)    [employer] [place] [employer] - Occupational Stress Questionnaire     Feeling of Stress : To some extent   Social Connections: Unknown (12/14/2024)    Social Connection and Isolation Panel [NHANES]     Frequency of Communication with Friends and Family: Not on file     Frequency of Social Gatherings with Friends and Family: Three times a week     Attends Buddhism Services: Not on file     Active Member of Clubs or Organizations: Not on file     Attends Club or Organization Meetings: Not on file     Marital Status: Not on file   Interpersonal Safety: Low Risk  (12/19/2024)    Interpersonal Safety     Do you feel physically and emotionally safe where you currently live?: Yes     Within the past 12 months, have you been hit, slapped, kicked or otherwise physically hurt by someone?: No     Within the past 12 months, have you been humiliated or emotionally abused in other ways by your partner or ex-partner?: No   Housing Stability: Low Risk  (12/14/2024)    Housing Stability     Do you have housing? : Yes     Are you worried about losing your housing?: No       Family History  Family History   Problem Relation Age of Onset    Hypertension Mother         alive in her 80s    Hyperlipidemia Mother     Deep Vein Thrombosis (DVT) Mother     Hypertension Father     C.A.D. Father         S/P bypass surgery    Heart Disease Father         alive in his 80s    Deep Vein Thrombosis Father     Pulmonary Embolism Father     No Known Problems Brother     Cerebrovascular Disease Maternal Grandmother     Cerebrovascular Disease Maternal Grandfather     Anesthesia Reaction No family hx of        Review of Systems  The complete review of systems is negative other than noted in the HPI or here.    Anesthesia Evaluation   Pt has had prior anesthetic.     No history of anesthetic complications       ROS/MED HX  ENT/Pulmonary: Comment: Hamartoma     (+)     JAIME risk factors,  hypertension,                              (-) tobacco use   Neurologic: Comment: Progressive cognitive decline  Vertigo     (+)      migraines,                       (-) no seizures and no CVA   Cardiovascular:     (+) Dyslipidemia hypertension- -   -  - -                                 Previous cardiac testing   Echo: Date: Results:    Stress Test:  Date: Results:    ECG Reviewed:  Date: 2023 Results:  NSR  Cath:  Date: Results:      METS/Exercise Tolerance: >4 METS Comment: Works out with , walking, paddle board. Denies DOMINGUEZ or CP    Hematologic:  - neg hematologic  ROS  (-) history of blood clots and history of blood transfusion   Musculoskeletal:  - neg musculoskeletal ROS     GI/Hepatic:  - neg GI/hepatic ROS  (-) GERD   Renal/Genitourinary:  - neg Renal ROS     Endo:  - neg endo ROS  (-) chronic steroid usage   Psychiatric/Substance Use:       Infectious Disease:  - neg infectious disease ROS     Malignancy:  - neg malignancy ROS     Other:            Virtual visit -  No vitals were obtained    Physical Exam  Constitutional: Awake, alert, cooperative, no apparent distress, and appears stated age.  HENT: Normocephalic  Respiratory: non labored breathing   Neurologic: Awake, alert, oriented to name, place and time.   Neuropsychiatric: Calm, cooperative. Normal affect.      Prior Labs/Diagnostic Studies   All labs and imaging pertinent to the visit personally reviewed     EKG/ stress test - if available please see in ROS above   No results found.       No data to display                  The patient's records and results pertinent to the visit personally reviewed by this provider.     Outside records reviewed from: Care Everywhere      Assessment    Suzanne MONTIEL King is a 70 year old female seen as a PAC referral for  "risk assessment and optimization for anesthesia.    Plan/Recommendations  Pt will be optimized for the proposed procedure.  See below for details on the assessment, risk, and preoperative recommendations    NEUROLOGY  - No history of TIA, CVA or seizure  -progressive cognitive decline. Follows with neurology. Last seen 6/19/25 with recommendation to use vitamin E BID, remain physicially and mentally active, repeat neuropsych testing 18 months after 9/2024 testing. Follow up in 1 year.  -Post Op delirium risk factors:  History of pre-existing cognitive dysfunction    ENT  - No current airway concerns.  Will need to be reassessed day of surgery.  Mallampati: Unable to assess  TM: Unable to assess  -hamartoma with the above procedure planned     CARDIAC  - No history of CAD and Afib  -hypertension per chart review. No current meds. BP 120s-130s/70s-80s at most recent clinic visits.   -denies cardiac symptoms   - METS (Metabolic Equivalents)  Patient performs 4 or more METS exercise without symptoms             Total Score: 0      RCRI-Very low risk: Class 1 0.4% complication rate             Total Score: 0        PULMONARY  JAIME Low Risk             Total Score: 1    JAIME: Over 50 ys old      - Denies asthma or inhaler use  - Tobacco History    History   Smoking Status    Never   Smokeless Tobacco    Never       GI  PONV High Risk  Total Score: 3           1 AN PONV: Pt is Female    1 AN PONV: Patient is not a current smoker    1 AN PONV: Intended Post Op Opioids        /RENAL  - Baseline Creatinine WNL, will update prior to surgery    ENDOCRINE    - BMI: Estimated body mass index is 22.79 kg/m  as calculated from the following:    Height as of this encounter: 1.657 m (5' 5.25\").    Weight as of this encounter: 62.6 kg (138 lb).  Healthy Weight (BMI 18.5-24.9)  - No history of Diabetes Mellitus    HEME  VTE Medium Risk 1.8%             Total Score: 5    VTE: Greater than 59 yrs old    VTE: Family Hx of VTE      - No " history of abnormal bleeding or antiplatelet use.  -will update CBC prior to surgery     MSK  Patient is NOT Frail           Total Score: 0   -PM&R referral is not indicated     Different anesthesia methods/types have been discussed with the patient, but they are aware that the final plan will be decided by the assigned anesthesia provider on the date of service.    The patient is optimized for their procedure. AVS with information on surgery time/arrival time, meds and NPO status given by nursing staff. No further diagnostic testing indicated.    Please refer to the physical examination documented by the anesthesiologist in the anesthesia record on the day of surgery.    Video-Visit Details    Type of service:  Video Visit    Provider received verbal consent for a Video Visit from the patient? Yes     Originating Location (pt. Location): Home    Distant Location (provider location):  Off-site  Mode of Communication:  Video Conference via [employer]    32 minutes were spent on the date of the encounter performing chart review, history and exam, documentation and/or discussion with other providers about the issues documented above.    Briseyda Covington PA-C  Preoperative Assessment Center  [facility]  [facility]  Phone: [phone]  Fax: [phone]

## 2025-06-25 NOTE — H&P
Pre-Operative H & P     CC:  Preoperative exam to assess for increased cardiopulmonary risk while undergoing surgery and anesthesia.    Date of Encounter: 6/25/2025  Primary Care Physician:  Ciara Moe     Reason for visit:   Encounter Diagnosis   Name Primary?    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 Malik has a known nasal lesion. She was evaluated by Dr. Camarena and was found to have a hamartoma. She is now scheduled for the above procedure.     History is obtained from the patient and chart review    Hx of abnormal bleeding or anti-platelet use: denies    Menstrual history: No LMP recorded (lmp unknown). Patient has had a hysterectomy.    Past Medical History  Past Medical History:   Diagnosis Date    Allergy, unspecified not elsewhere classified     Basal cell cancer 2014    Breast cyst 2008    Left breast    Breast cyst 2011    Right breast    Diffuse cystic mastopathy     Essential hypertension, benign     Intestinal disaccharidase deficiencies and disaccharide malabsorption     Migraine, unspecified, without mention of intractable migraine without mention of status migrainosus     Unspecified menopausal and postmenopausal disorder        Past Surgical History  Past Surgical History:   Procedure Laterality Date    BASAL CELL  CARCINOMA EXCISION  01/01/2014    left shoulder    BREAST CYST ASPIRATION Left 01/01/2008    BREAST CYST ASPIRATION Right 01/01/2011    HC REMOVAL OF TONSILS,<9 Y/O      Description: Tonsillectomy;  Proc Date: 01/01/1957;    HYSTERECTOMY      OOPHORECTOMY      ZC APPENDECTOMY      Description: Appendectomy;  Proc Date: 01/01/1963;    ZZC TOTAL ABDOM HYSTERECTOMY  01/01/1998    for adenomyosis    Z VAG HYST,RMV TUBE/OVARY  01/01/1998       Prior to Admission Medications  Current Outpatient Medications   Medication Sig Dispense Refill    Azelastine HCl 137 MCG/SPRAY SOLN SPRAY 1 SPRAY INTO BOTH NOSTRILS DAILY AS NEEDED FOR ALLERGIES 30 mL 2    Calcium-Magnesium-Zinc 333-133-5 MG TABS per tablet Take 1 tablet by mouth daily.      cetirizine (ZYRTEC) 5 MG tablet Take 5 mg by mouth daily.      famotidine (PEPCID) 10 MG tablet Take 10 mg by mouth nightly as needed.      fluticasone furoate 27.5 MCG/SPRAY nasal spray Spray 2 sprays into both nostrils daily.      ibuprofen (ADVIL/MOTRIN) 200 MG tablet Take 200 mg by mouth every 4 hours as needed.      meclizine (DRAMAMINE LESS DROWSY) 25 MG tablet Take 25 mg by mouth 3 times daily as needed.      melatonin 3 MG tablet Take 3 mg by mouth nightly as needed.      PATADAY 0.2 % OP SOLN Apply to eye as needed       Probiotic Product (DIGESTIVE ADVANTAGE PO) Take 1 tablet by mouth at bedtime. Digestive Spectrum      UNABLE TO FIND MEDICATION NAME: HistaResist      vitamin B-Complex Take 1 tablet by mouth every morning.      vitamin C (ASCORBIC ACID) 1000 MG TABS Take 1,000 mg by mouth every morning.      Vitamin D3 (D3-1000) 25 mcg (1000 units) tablet Take 1 tablet by mouth every morning.      Vitamin Mixture (VITAMIN E COMPLETE PO) Take 600 mg by mouth 2 times daily.      MAGNESIUM CITRATE PO Take by mouth. (Patient not taking: Reported on 6/25/2025)         Allergies  Allergies   Allergen Reactions    Ragweeds      Food oral allergy syndrome       Social History  Social  History     Socioeconomic History    Marital status:      Spouse name: Not on file    Number of children: 0    Years of education: Not on file    Highest education level: Not on file   Occupational History    Occupation:      Comment: Losing job in 1 yr   Tobacco Use    Smoking status: Never     Passive exposure: Never    Smokeless tobacco: Never   Vaping Use    Vaping status: Never Used   Substance and Sexual Activity    Alcohol use: Not Currently    Drug use: No    Sexual activity: Not Currently   Other Topics Concern     Service No    Blood Transfusions No    Caffeine Concern No    Occupational Exposure No    Hobby Hazards No    Sleep Concern No    Stress Concern Yes    Weight Concern No    Special Diet No    Back Care No    Exercise Yes     Comment: weights 2 x per week, aerobics 3 x per week    Bike Helmet Yes    Seat Belt Yes    Self-Exams Yes   Social History Narrative    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  She lives alone and does not have children and enjoys outdoor activities- paddle boarding at [place]     Social Drivers of Health     Financial Resource Strain: Low Risk  (12/14/2024)    Financial Resource Strain     Within the past 12 months, have you or your family members you live with been unable to get utilities (heat, electricity) when it was really needed?: No   Food Insecurity: Low Risk  (12/14/2024)    Food Insecurity     Within the past 12 months, did you worry that your food would run out before you got money to buy more?: No     Within the past 12 months, did the food you bought just not last and you didn t have money to get more?: No   Transportation Needs: Low Risk  (12/14/2024)    Transportation Needs     Within the past 12 months, has lack of transportation kept you from medical appointments, getting your medicines, non-medical meetings or appointments, work, or from getting  things that you need?: No   Physical Activity: Sufficiently Active (12/14/2024)    Exercise Vital Sign     Days of Exercise per Week: 5 days     Minutes of Exercise per Session: 40 min   Stress: Stress Concern Present (12/14/2024)    [place] [employer] - Occupational Stress Questionnaire     Feeling of Stress : To some extent   Social Connections: Unknown (12/14/2024)    Social Connection and Isolation Panel [NHANES]     Frequency of Communication with Friends and Family: Not on file     Frequency of Social Gatherings with Friends and Family: Three times a week     Attends [employer] Services: Not on file     Active Member of Clubs or Organizations: Not on file     Attends Club or Organization Meetings: Not on file     Marital Status: Not on file   Interpersonal Safety: Low Risk  (12/19/2024)    Interpersonal Safety     Do you feel physically and emotionally safe where you currently live?: Yes     Within the past 12 months, have you been hit, slapped, kicked or otherwise physically hurt by someone?: No     Within the past 12 months, have you been humiliated or emotionally abused in other ways by your partner or ex-partner?: No   Housing Stability: Low Risk  (12/14/2024)    Housing Stability     Do you have housing? : Yes     Are you worried about losing your housing?: No       Family History  Family History   Problem Relation Age of Onset    Hypertension Mother         alive in her 80s    Hyperlipidemia Mother     Deep Vein Thrombosis (DVT) Mother     Hypertension Father     C.A.D. Father         S/P bypass surgery    Heart Disease Father         alive in his 80s    Deep Vein Thrombosis Father     Pulmonary Embolism Father     No Known Problems Brother     Cerebrovascular Disease Maternal Grandmother     Cerebrovascular Disease Maternal Grandfather     Anesthesia Reaction No family hx of        Review of Systems  The complete review of systems is negative other than noted in the HPI or here.    Anesthesia Evaluation   Pt has had prior anesthetic.     No history of anesthetic complications       ROS/MED HX  ENT/Pulmonary: Comment: Hamartoma     (+)     JAIME risk factors,  hypertension,                              (-) tobacco use   Neurologic: Comment: Progressive cognitive decline  Vertigo     (+)      migraines,                       (-) no seizures and no CVA   Cardiovascular:     (+) Dyslipidemia hypertension- -   -  - -                                 Previous cardiac testing   Echo: Date: Results:    Stress Test:  Date: Results:    ECG Reviewed:  Date: 2023 Results:  NSR  Cath:  Date: Results:      METS/Exercise Tolerance: >4 METS Comment: Works out with , walking, paddle board. Denies DOMINGUEZ or CP    Hematologic:  - neg hematologic  ROS  (-) history of blood clots and history of blood transfusion   Musculoskeletal:  - neg musculoskeletal ROS     GI/Hepatic:  - neg GI/hepatic ROS  (-) GERD   Renal/Genitourinary:  - neg Renal ROS     Endo:  - neg endo ROS  (-) chronic steroid usage   Psychiatric/Substance Use:       Infectious Disease:  - neg infectious disease ROS     Malignancy:  - neg malignancy ROS     Other:            Virtual visit -  No vitals were obtained    Physical Exam  Constitutional: Awake, alert, cooperative, no apparent distress, and appears stated age.  HENT: Normocephalic  Respiratory: non labored breathing   Neurologic: Awake, alert, oriented to name, place and time.   Neuropsychiatric: Calm, cooperative. Normal affect.      Prior Labs/Diagnostic Studies   All labs and imaging pertinent to the visit personally reviewed     EKG/ stress test - if available please see in ROS above   No results found.       No data to display                  The patient's records and results pertinent to the visit personally reviewed by this provider.     Outside records reviewed from: Care Everywhere      Assessment    Suzanne MONTIEL King is a 70 year old female seen as a PAC referral for  "risk assessment and optimization for anesthesia.    Plan/Recommendations  Pt will be optimized for the proposed procedure.  See below for details on the assessment, risk, and preoperative recommendations    NEUROLOGY  - No history of TIA, CVA or seizure  -progressive cognitive decline. Follows with neurology. Last seen 6/19/25 with recommendation to use vitamin E BID, remain physicially and mentally active, repeat neuropsych testing 18 months after 9/2024 testing. Follow up in 1 year.  -Post Op delirium risk factors:  History of pre-existing cognitive dysfunction    ENT  - No current airway concerns.  Will need to be reassessed day of surgery.  Mallampati: Unable to assess  TM: Unable to assess  -hamartoma with the above procedure planned     CARDIAC  - No history of CAD and Afib  -hypertension per chart review. No current meds. BP 120s-130s/70s-80s at most recent clinic visits.   -denies cardiac symptoms   - METS (Metabolic Equivalents)  Patient performs 4 or more METS exercise without symptoms             Total Score: 0      RCRI-Very low risk: Class 1 0.4% complication rate             Total Score: 0        PULMONARY  JAIME Low Risk             Total Score: 1    JAIME: Over 50 ys old      - Denies asthma or inhaler use  - Tobacco History    History   Smoking Status    Never   Smokeless Tobacco    Never       GI  PONV High Risk  Total Score: 3           1 AN PONV: Pt is Female    1 AN PONV: Patient is not a current smoker    1 AN PONV: Intended Post Op Opioids        /RENAL  - Baseline Creatinine WNL, will update prior to surgery    ENDOCRINE    - BMI: Estimated body mass index is 22.79 kg/m  as calculated from the following:    Height as of this encounter: 1.657 m (5' 5.25\").    Weight as of this encounter: 62.6 kg (138 lb).  Healthy Weight (BMI 18.5-24.9)  - No history of Diabetes Mellitus    HEME  VTE Medium Risk 1.8%             Total Score: 5    VTE: Greater than 59 yrs old    VTE: Family Hx of VTE      - No " history of abnormal bleeding or antiplatelet use.  -will update CBC prior to surgery     MSK  Patient is NOT Frail           Total Score: 0   -PM&R referral is not indicated     Different anesthesia methods/types have been discussed with the patient, but they are aware that the final plan will be decided by the assigned anesthesia provider on the date of service.    The patient is optimized for their procedure. AVS with information on surgery time/arrival time, meds and NPO status given by nursing staff. No further diagnostic testing indicated.    Please refer to the physical examination documented by the anesthesiologist in the anesthesia record on the day of surgery.    Video-Visit Details    Type of service:  Video Visit    Provider received verbal consent for a Video Visit from the patient? Yes     Originating Location (pt. Location): Home    Distant Location (provider location):  Off-site  Mode of Communication:  Video Conference via [email or web address]    32 minutes were spent on the date of the encounter performing chart review, history and exam, documentation and/or discussion with other providers about the issues documented above.    Briseyda Covington PA-C  Preoperative Assessment Center  [facility]  [facility]  Phone: [phone]  Fax: [phone]

## 2025-06-27 RX ORDER — OXYCODONE HYDROCHLORIDE 5 MG/1
5 TABLET ORAL
Refills: 0 | Status: CANCELLED | OUTPATIENT
Start: 2025-06-27

## 2025-06-27 RX ORDER — ONDANSETRON 2 MG/ML
4 INJECTION INTRAMUSCULAR; INTRAVENOUS EVERY 30 MIN PRN
Status: CANCELLED | OUTPATIENT
Start: 2025-06-27

## 2025-06-27 RX ORDER — DEXAMETHASONE SODIUM PHOSPHATE 10 MG/ML
4 INJECTION, SOLUTION INTRAMUSCULAR; INTRAVENOUS
Status: CANCELLED | OUTPATIENT
Start: 2025-06-27

## 2025-06-27 RX ORDER — OXYCODONE HYDROCHLORIDE 5 MG/1
10 TABLET ORAL
Refills: 0 | Status: CANCELLED | OUTPATIENT
Start: 2025-06-27

## 2025-06-27 RX ORDER — ONDANSETRON 4 MG/1
4 TABLET, ORALLY DISINTEGRATING ORAL EVERY 30 MIN PRN
Status: CANCELLED | OUTPATIENT
Start: 2025-06-27

## 2025-06-27 RX ORDER — NALOXONE HYDROCHLORIDE 0.4 MG/ML
0.1 INJECTION, SOLUTION INTRAMUSCULAR; INTRAVENOUS; SUBCUTANEOUS
Status: CANCELLED | OUTPATIENT
Start: 2025-06-27

## 2025-06-29 ASSESSMENT — LIFESTYLE VARIABLES: TOBACCO_USE: 0

## 2025-06-29 ASSESSMENT — ENCOUNTER SYMPTOMS: SEIZURES: 0

## 2025-06-29 NOTE — ANESTHESIA PREPROCEDURE EVALUATION
Anesthesia Pre-Procedure Evaluation    Patient: Suzanne Malik   MRN: [id number] : 1955          Procedure : Procedure(s):  Image guided bilateral resection of nasal cavity tumor         Past Medical History:   Diagnosis Date    Allergy, unspecified not elsewhere classified     Basal cell cancer     Breast cyst     Left breast    Breast cyst     Right breast    Diffuse cystic mastopathy     Essential hypertension, benign     Intestinal disaccharidase deficiencies and disaccharide malabsorption     Migraine, unspecified, without mention of intractable migraine without mention of status migrainosus     Unspecified menopausal and postmenopausal disorder       Past Surgical History:   Procedure Laterality Date    BASAL CELL CARCINOMA EXCISION  2014    left shoulder    BREAST CYST ASPIRATION Left 2008    BREAST CYST ASPIRATION Right 2011    HC REMOVAL OF TONSILS,<13 Y/O      Description: Tonsillectomy;  Proc Date: 1957;    HYSTERECTOMY      OOPHORECTOMY      [facility] APPENDECTOMY      Description: Appendectomy;  Proc Date: 1963;    ZC TOTAL ABDOM HYSTERECTOMY  1998    for adenomyosis    Z VAG HYST,RMV TUBE/OVARY  1998      Allergies   Allergen Reactions    Ragweeds      Food oral allergy syndrome      Social History     Tobacco Use    Smoking status: Never     Passive exposure: Never    Smokeless tobacco: Never   Substance Use Topics    Alcohol use: Not Currently      Wt Readings from Last 1 Encounters:   25 62.6 kg (138 lb)        Anesthesia Evaluation   Pt has had prior anesthetic.     No history of anesthetic complications       ROS/MED HX  ENT/Pulmonary: Comment: Hamartoma     (+)     JAIME risk factors,  hypertension,                              (-) tobacco use   Neurologic: Comment: Progressive cognitive decline  Vertigo     (+)      migraines,                       (-) no seizures and no CVA   Cardiovascular:     (+) Dyslipidemia hypertension- -   -  " - -                                 Previous cardiac testing   Echo: Date: Results:    Stress Test:  Date: Results:    ECG Reviewed:  Date: 2023 Results:  NSR  Cath:  Date: Results:      METS/Exercise Tolerance: >4 METS Comment: Works out with , walking, paddle board. Denies DOMINGUEZ or CP    Hematologic:  - neg hematologic  ROS  (-) history of blood clots and history of blood transfusion   Musculoskeletal:  - neg musculoskeletal ROS     GI/Hepatic:  - neg GI/hepatic ROS  (-) GERD   Renal/Genitourinary:  - neg Renal ROS     Endo:  - neg endo ROS  (-) chronic steroid usage   Psychiatric/Substance Use:       Infectious Disease:  - neg infectious disease ROS     Malignancy:  - neg malignancy ROS     Other:              Physical Exam  Airway  Mallampati: II    Cardiovascular   Rhythm: regular     Dental   (+) Minor Abnormalities - some fillings, tiny chips      Pulmonary       Neurological   Other Findings       OUTSIDE LABS:  CBC:   Lab Results   Component Value Date    WBC 5.1 06/27/2025    WBC 5.5 12/18/2024    HGB 14.5 06/27/2025    HGB 13.8 12/18/2024    HCT 43.8 06/27/2025    HCT 41.6 12/18/2024     06/27/2025     12/18/2024     BMP:   Lab Results   Component Value Date     06/27/2025     12/18/2024    POTASSIUM 4.0 06/27/2025    POTASSIUM 4.1 12/18/2024    CHLORIDE 104 06/27/2025    CHLORIDE 105 12/18/2024    CO2 29 06/27/2025    CO2 28 12/18/2024    BUN 12.7 06/27/2025    BUN 13.7 12/18/2024    CR 0.84 06/27/2025    CR 0.90 12/18/2024    GLC 84 06/27/2025    GLC 81 12/18/2024     COAGS: No results found for: \"PTT\", \"INR\", \"FIBR\"  POC: No results found for: \"BGM\", \"HCG\", \"HCGS\"  HEPATIC:   Lab Results   Component Value Date    ALBUMIN 4.1 07/19/2021    PROTTOTAL 7.0 07/19/2021    ALT 17 07/19/2021    AST 25 07/19/2021    ALKPHOS 67 07/19/2021    BILITOTAL 0.7 07/19/2021     OTHER:   Lab Results   Component Value Date    A1C 5.3 12/18/2024    KATALINA 9.5 06/27/2025    MAG 2.1 " 06/09/2023    TSH 1.56 09/11/2024       Anesthesia Plan    ASA Status:  2      NPO Status: NPO Appropriate   Anesthesia Type: General.  Airway: oral.  Induction: intravenous.  Maintenance: TIVA.        Consents    Anesthesia Plan(s) and associated risks, benefits, and realistic alternatives discussed. Questions answered and patient/representative(s) expressed understanding.     - Discussed:     - Discussed with:  Patient               Postoperative Care         Comments:                   Errol Stephen MD    I have reviewed the pertinent notes and labs in the chart from the past 30 days and (re)examined the patient.  Any updates or changes from those notes are reflected in this note.    Clinically Significant Risk Factors Present on Admission                   # Hypertension: Noted on problem list

## 2025-06-30 ENCOUNTER — HOSPITAL ENCOUNTER (OUTPATIENT)
Facility: AMBULATORY SURGERY CENTER | Age: 70
Discharge: HOME | End: 2025-06-30
Attending: STUDENT IN AN ORGANIZED HEALTH CARE EDUCATION/TRAINING PROGRAM | Admitting: STUDENT IN AN ORGANIZED HEALTH CARE EDUCATION/TRAINING PROGRAM
Payer: COMMERCIAL

## 2025-06-30 ENCOUNTER — ANESTHESIA (OUTPATIENT)
Dept: SURGERY | Facility: AMBULATORY SURGERY CENTER | Age: 70
End: 2025-06-30
Payer: COMMERCIAL

## 2025-06-30 VITALS
SYSTOLIC BLOOD PRESSURE: 138 MMHG | HEIGHT: 65 IN | TEMPERATURE: 97.8 F | HEART RATE: 72 BPM | WEIGHT: 138 LBS | BODY MASS INDEX: 22.99 KG/M2 | DIASTOLIC BLOOD PRESSURE: 75 MMHG | RESPIRATION RATE: 16 BRPM | OXYGEN SATURATION: 96 %

## 2025-06-30 PROCEDURE — 88305 TISSUE EXAM BY PATHOLOGIST: CPT | Mod: 26 | Performed by: STUDENT IN AN ORGANIZED HEALTH CARE EDUCATION/TRAINING PROGRAM

## 2025-06-30 PROCEDURE — 88305 TISSUE EXAM BY PATHOLOGIST: CPT | Mod: TC | Performed by: STUDENT IN AN ORGANIZED HEALTH CARE EDUCATION/TRAINING PROGRAM

## 2025-06-30 RX ORDER — ONDANSETRON 2 MG/ML
INJECTION INTRAMUSCULAR; INTRAVENOUS PRN
Status: DISCONTINUED | OUTPATIENT
Start: 2025-06-30 | End: 2025-06-30

## 2025-06-30 RX ORDER — OXYMETAZOLINE HYDROCHLORIDE 0.05 G/100ML
SPRAY NASAL PRN
Status: DISCONTINUED | OUTPATIENT
Start: 2025-06-30 | End: 2025-06-30 | Stop reason: HOSPADM

## 2025-06-30 RX ORDER — FENTANYL CITRATE 50 UG/ML
INJECTION, SOLUTION INTRAMUSCULAR; INTRAVENOUS PRN
Status: DISCONTINUED | OUTPATIENT
Start: 2025-06-30 | End: 2025-06-30

## 2025-06-30 RX ORDER — LABETALOL 20 MG/4 ML (5 MG/ML) INTRAVENOUS SYRINGE
PRN
Status: DISCONTINUED | OUTPATIENT
Start: 2025-06-30 | End: 2025-06-30

## 2025-06-30 RX ORDER — CEFAZOLIN SODIUM 2 G/50ML
2 SOLUTION INTRAVENOUS SEE ADMIN INSTRUCTIONS
Status: DISCONTINUED | OUTPATIENT
Start: 2025-06-30 | End: 2025-07-01 | Stop reason: HOSPADM

## 2025-06-30 RX ORDER — LIDOCAINE HYDROCHLORIDE 20 MG/ML
INJECTION, SOLUTION INFILTRATION; PERINEURAL PRN
Status: DISCONTINUED | OUTPATIENT
Start: 2025-06-30 | End: 2025-06-30

## 2025-06-30 RX ORDER — ONDANSETRON 2 MG/ML
4 INJECTION INTRAMUSCULAR; INTRAVENOUS EVERY 30 MIN PRN
Status: DISCONTINUED | OUTPATIENT
Start: 2025-06-30 | End: 2025-07-01 | Stop reason: HOSPADM

## 2025-06-30 RX ORDER — FENTANYL CITRATE 50 UG/ML
25 INJECTION, SOLUTION INTRAMUSCULAR; INTRAVENOUS EVERY 5 MIN PRN
Status: DISCONTINUED | OUTPATIENT
Start: 2025-06-30 | End: 2025-07-01 | Stop reason: HOSPADM

## 2025-06-30 RX ORDER — PROPOFOL 10 MG/ML
INJECTION, EMULSION INTRAVENOUS CONTINUOUS PRN
Status: DISCONTINUED | OUTPATIENT
Start: 2025-06-30 | End: 2025-06-30

## 2025-06-30 RX ORDER — SODIUM CHLORIDE, SODIUM LACTATE, POTASSIUM CHLORIDE, CALCIUM CHLORIDE 600; 310; 30; 20 MG/100ML; MG/100ML; MG/100ML; MG/100ML
INJECTION, SOLUTION INTRAVENOUS CONTINUOUS
Status: DISCONTINUED | OUTPATIENT
Start: 2025-06-30 | End: 2025-07-01 | Stop reason: HOSPADM

## 2025-06-30 RX ORDER — PROPOFOL 10 MG/ML
INJECTION, EMULSION INTRAVENOUS PRN
Status: DISCONTINUED | OUTPATIENT
Start: 2025-06-30 | End: 2025-06-30

## 2025-06-30 RX ORDER — CEFAZOLIN SODIUM 2 G/50ML
2 SOLUTION INTRAVENOUS
Status: DISCONTINUED | OUTPATIENT
Start: 2025-06-30 | End: 2025-07-01 | Stop reason: HOSPADM

## 2025-06-30 RX ORDER — NALOXONE HYDROCHLORIDE 0.4 MG/ML
0.1 INJECTION, SOLUTION INTRAMUSCULAR; INTRAVENOUS; SUBCUTANEOUS
Status: DISCONTINUED | OUTPATIENT
Start: 2025-06-30 | End: 2025-07-01 | Stop reason: HOSPADM

## 2025-06-30 RX ORDER — LABETALOL HYDROCHLORIDE 5 MG/ML
20 INJECTION, SOLUTION INTRAVENOUS ONCE
Status: COMPLETED | OUTPATIENT
Start: 2025-06-30 | End: 2025-06-30

## 2025-06-30 RX ORDER — LIDOCAINE 40 MG/G
CREAM TOPICAL
Status: DISCONTINUED | OUTPATIENT
Start: 2025-06-30 | End: 2025-07-01 | Stop reason: HOSPADM

## 2025-06-30 RX ORDER — DEXAMETHASONE SODIUM PHOSPHATE 10 MG/ML
10 INJECTION, SOLUTION INTRAMUSCULAR; INTRAVENOUS ONCE
Status: COMPLETED | OUTPATIENT
Start: 2025-06-30 | End: 2025-06-30

## 2025-06-30 RX ORDER — HYDRALAZINE HYDROCHLORIDE 20 MG/ML
10 INJECTION INTRAMUSCULAR; INTRAVENOUS ONCE
Status: COMPLETED | OUTPATIENT
Start: 2025-06-30 | End: 2025-06-30

## 2025-06-30 RX ORDER — LIDOCAINE HYDROCHLORIDE AND EPINEPHRINE 10; 10 MG/ML; UG/ML
INJECTION, SOLUTION INFILTRATION; PERINEURAL PRN
Status: DISCONTINUED | OUTPATIENT
Start: 2025-06-30 | End: 2025-06-30 | Stop reason: HOSPADM

## 2025-06-30 RX ORDER — HYDROMORPHONE HYDROCHLORIDE 1 MG/ML
0.4 INJECTION, SOLUTION INTRAMUSCULAR; INTRAVENOUS; SUBCUTANEOUS EVERY 5 MIN PRN
Status: DISCONTINUED | OUTPATIENT
Start: 2025-06-30 | End: 2025-07-01 | Stop reason: HOSPADM

## 2025-06-30 RX ORDER — HYDROMORPHONE HYDROCHLORIDE 1 MG/ML
0.2 INJECTION, SOLUTION INTRAMUSCULAR; INTRAVENOUS; SUBCUTANEOUS EVERY 5 MIN PRN
Status: DISCONTINUED | OUTPATIENT
Start: 2025-06-30 | End: 2025-07-01 | Stop reason: HOSPADM

## 2025-06-30 RX ORDER — MAGNESIUM HYDROXIDE 1200 MG/15ML
LIQUID ORAL PRN
Status: DISCONTINUED | OUTPATIENT
Start: 2025-06-30 | End: 2025-06-30 | Stop reason: HOSPADM

## 2025-06-30 RX ORDER — ONDANSETRON 4 MG/1
4 TABLET, ORALLY DISINTEGRATING ORAL EVERY 30 MIN PRN
Status: DISCONTINUED | OUTPATIENT
Start: 2025-06-30 | End: 2025-07-01 | Stop reason: HOSPADM

## 2025-06-30 RX ORDER — ACETAMINOPHEN 325 MG/1
975 TABLET ORAL ONCE
Status: COMPLETED | OUTPATIENT
Start: 2025-06-30 | End: 2025-06-30

## 2025-06-30 RX ORDER — METOPROLOL TARTRATE 1 MG/ML
INJECTION, SOLUTION INTRAVENOUS PRN
Status: DISCONTINUED | OUTPATIENT
Start: 2025-06-30 | End: 2025-06-30

## 2025-06-30 RX ORDER — DEXAMETHASONE SODIUM PHOSPHATE 10 MG/ML
4 INJECTION, SOLUTION INTRAMUSCULAR; INTRAVENOUS
Status: DISCONTINUED | OUTPATIENT
Start: 2025-06-30 | End: 2025-07-01 | Stop reason: HOSPADM

## 2025-06-30 RX ORDER — FENTANYL CITRATE 50 UG/ML
50 INJECTION, SOLUTION INTRAMUSCULAR; INTRAVENOUS EVERY 5 MIN PRN
Status: DISCONTINUED | OUTPATIENT
Start: 2025-06-30 | End: 2025-07-01 | Stop reason: HOSPADM

## 2025-06-30 RX ADMIN — LABETALOL 20 MG/4 ML (5 MG/ML) INTRAVENOUS SYRINGE 10 MG: at 08:13

## 2025-06-30 RX ADMIN — METOPROLOL TARTRATE 2.5 MG: 1 INJECTION, SOLUTION INTRAVENOUS at 08:49

## 2025-06-30 RX ADMIN — ONDANSETRON 4 MG: 2 INJECTION INTRAMUSCULAR; INTRAVENOUS at 08:19

## 2025-06-30 RX ADMIN — PROPOFOL 200 MG: 10 INJECTION, EMULSION INTRAVENOUS at 07:28

## 2025-06-30 RX ADMIN — LABETALOL 20 MG/4 ML (5 MG/ML) INTRAVENOUS SYRINGE 10 MG: at 08:22

## 2025-06-30 RX ADMIN — FENTANYL CITRATE 50 MCG: 50 INJECTION, SOLUTION INTRAMUSCULAR; INTRAVENOUS at 07:17

## 2025-06-30 RX ADMIN — LABETALOL HYDROCHLORIDE 20 MG: 5 INJECTION, SOLUTION INTRAVENOUS at 09:06

## 2025-06-30 RX ADMIN — METOPROLOL TARTRATE 2.5 MG: 1 INJECTION, SOLUTION INTRAVENOUS at 08:31

## 2025-06-30 RX ADMIN — DEXAMETHASONE SODIUM PHOSPHATE 10 MG: 10 INJECTION, SOLUTION INTRAMUSCULAR; INTRAVENOUS at 07:36

## 2025-06-30 RX ADMIN — HYDRALAZINE HYDROCHLORIDE 10 MG: 20 INJECTION INTRAMUSCULAR; INTRAVENOUS at 09:33

## 2025-06-30 RX ADMIN — SODIUM CHLORIDE, SODIUM LACTATE, POTASSIUM CHLORIDE, CALCIUM CHLORIDE: 600; 310; 30; 20 INJECTION, SOLUTION INTRAVENOUS at 08:15

## 2025-06-30 RX ADMIN — SODIUM CHLORIDE, SODIUM LACTATE, POTASSIUM CHLORIDE, CALCIUM CHLORIDE: 600; 310; 30; 20 INJECTION, SOLUTION INTRAVENOUS at 06:32

## 2025-06-30 RX ADMIN — Medication 50 MG: at 07:29

## 2025-06-30 RX ADMIN — Medication 0.5 MG: at 07:56

## 2025-06-30 RX ADMIN — CEFAZOLIN SODIUM 2 G: 2 SOLUTION INTRAVENOUS at 07:18

## 2025-06-30 RX ADMIN — FENTANYL CITRATE 50 MCG: 50 INJECTION, SOLUTION INTRAMUSCULAR; INTRAVENOUS at 07:36

## 2025-06-30 RX ADMIN — LIDOCAINE HYDROCHLORIDE 80 MG: 20 INJECTION, SOLUTION INFILTRATION; PERINEURAL at 07:28

## 2025-06-30 RX ADMIN — PROPOFOL 200 MCG/KG/MIN: 10 INJECTION, EMULSION INTRAVENOUS at 07:28

## 2025-06-30 RX ADMIN — ACETAMINOPHEN 975 MG: 325 TABLET ORAL at 06:34

## 2025-06-30 NOTE — ANESTHESIA CARE TRANSFER NOTE
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    Anesthesia Type:   General     Note:    Oropharynx: oropharynx clear of all foreign objects and spontaneously breathing  Level of Consciousness: drowsy  Oxygen Supplementation: face mask  Level of Supplemental Oxygen (L/min / FiO2): 6  Independent Airway: airway patency satisfactory and stable  Dentition: dentition unchanged  Vital Signs Stable: post-procedure vital signs reviewed and stable  Report to RN Given: handoff report given  Patient transferred to: PACU    Handoff Report: Identifed the Patient, Identified the Reponsible Provider, Reviewed the pertinent medical history, Discussed the surgical course, Reviewed Intra-OP anesthesia mangement and issues during anesthesia, Set expectations for post-procedure period and Allowed opportunity for questions and acknowledgement of understanding    Informed Dr. Camarena/ Dr. Stephen of uncontrolled blood pressure. Suggested need to Dr. Camarena to let patient know to follow up with primary care physician.   Vitals:  Vitals Value Taken Time   /111 06/30/25 08:46   Temp 36  C (96.8  F) 06/30/25 08:51   Pulse 66 06/30/25 08:51   Resp 12 06/30/25 08:51   SpO2 99 % 06/30/25 08:51   Vitals shown include unfiled device data.    Electronically Signed By: VALENTIN Santana CRNA  June 30, 2025  8:52 AM

## 2025-06-30 NOTE — OP NOTE
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  Bilateral resection of olfactory cleft hamartomas  2.  Bilateral inferior turbinate outfracture  3.  Use of intraoperative image guidance    Findings:   Hamartomas with broad pedicles along the anterior olfactory cleft and medial root of middle turbinate; debulked to attachment site, but some tissue left in cleft to avoid injury to the olfactory fibers and possible CSF leak    Surgeon: Perry Camarena MD      Assistants:  1.  Patel Brennan MD     Estimated blood loss: 10 ml     Anesthesia: General     Indications for procedure: The patient has a history of nasal congestion and difficulty breathing through the nose and was found to have polypoid lesions of the olfactory cleft.  These were biopsied in clinic and were identified as hamartomas.  Given the location and symptoms, surgery was recommended.  After a full discussion of the risks benefits and alternatives the patient was amenable with proceeding and informed consent was obtained.      Procedure in detail: The patient was identified in the preoperative holding area and informed consent was confirmed.  The patient was then brought to the operating room and placed supine on the operating room table.  A surgical time out was held.  Care of the patient was then turned over to the anesthesia team and she underwent induction of general anesthesia with endotracheal intubation.  The patient was padded appropriately.  The table was turned 180 degrees.  The intraoperative image guidance system was applied, calibrated, and accuracy was confirmed.  The patient was prepped and draped in the usual fashion.  The patient received  antibiotics for prophylactics.  A timeout was performed confirm details of the procedure.     Oxymetazoline was instilled in the nasal cavity for topical decongestion.  We began with 0 degree inspection of the nasal cavity.  A Boies elevator was used to outfracture the inferior turbinates bilaterally.  5 ml's of 1% lidocaine with 1:100K epinephrine was injected into the middle turbinate and tumors.  Afrin soaked pledgets were placed for additional decongestion in the middle meatus.    We then used the microdebrider to progressively debulk the tumors.  We found on both sides that the tumors were attached at the anterior olfactory cleft, superior septum, and the medial surface of the middle turbinate. Given this attachment, complete resection was not possible due to risk of injury to the olfactory cleft and possible CSF leak.  Thus the lesions were maximally resected while leaving the tissue of the cleft in situ.      After hemostasis had been achieved, fibrillar was placed on the resection surfaces.  An orogastric tube was passed.  Care of the patient was then turned back over the anesthesia team.  The patient underwent an uneventful recovery from general anesthesia and was successfully extubated.  The patient was then transported to the postoperative care unit for additional care.      I was scrubbed for the entirety of the procedure, performed all the key portions, and directly supervised all resident participation.        Perry Camarena MD

## 2025-06-30 NOTE — DISCHARGE INSTRUCTIONS
What to expect:    Following this surgery your nose will be sore and congested.  You will likely have bloody nasal drainage and may pass large blood clots - this is normal.  You have dissolvable packing tucked inside your nose to help reduce bleeding and to keep the sinuses open; this will be removed at your follow up appointment.     What to do:  You can use tylenol and motrin around the clock for pain; you can use the prescription pain medication for severe pain  Please start using the NeilMed Sinus rinse the night of surgery - fill the bottle to the dotted line with distilled water and empty one salt packet in the bottle.  Warm the bottle up in the microwave so the saline is warm but not hot or boiling.  Place the black nozzle in the front of the nose, tuck your chin down while standing over the sink, and slowly and gently squeeze.  This will push the saline up into your nose; it may come out the same side, the other side, or out of your mouth, all are fine.  The goal is to gently flush the nose out to help keep your nose moisturized and open.  Please use this at least twice a day; you may use it more frequently if it feels comforting/soothing and or if you are getting a lot of debris out with the rinses (some people will use it 6-7 times a day in the post-operative period).  Please run a humidifier at the bedside overnight  Please do not place anything in the nose till you see Dr. Camarena back in the office  In case of a nose bleed please spray afrin in the nose (3-4 sprays on both sides) and gently hold pressure on the outside portion of the nose.  If bleeding persists or worsens please call the office for more advice.  Please refrain from heavy lifting or strenuous activity following surgery (no more than about a gallon of milk), though we encourage you to be up and walking (no bed rest).  It helps to sleep with the head of bed elevated by about 30 degrees.  Please try and keep your head above your heart to  minimize the amount of strain inside the nose.  Please follow these activity restrictions until you see Dr. Camarena back in the office for your post-operative visit.  You may return to regular activity and/or work earlier if we specifically discussed that it is safe prior to surgery.                [facility]  Home Care Following Anesthesia  For 24 hours after surgery:  Get plenty of rest.  A responsible adult must stay with you for at least 24 hours after you leave the surgery center.  Do not drive or use heavy equipment.  If you have weakness or tingling, don't drive or use heavy equipment until this feeling goes away.   Do not drink alcohol.   Avoid strenuous or risky activities.  Ask for help when climbing stairs.  You may feel lightheaded.  IF so, sit for a few minutes before standing.  Have someone help you get up.   If you have nausea (feel sick to your stomach): Drink only clear liquids such as apple juice, ginger ale, broth or 7-Up.  Rest may also help.  Be sure to drink enough fluids.  Move to a regular diet as you feel able.   You may have a slight fever.  Call the doctor if your fever is over 100 F (37.7 C) (taken under the tongue) or lasts longer than 24 hours.  You may have a dry mouth, a sore throat, muscle aches or trouble sleeping. These should go away after 24 hours.  Do not make important or legal decisions.   It is recommended to avoid smoking.               Tips for taking pain medications  To get the best pain relief possible, remember these points:  Take pain medications as directed, before pain becomes severe.  Pain medication can upset your stomach: taking it with food may help.  Constipation is a common side effect of pain medication. Drink plenty of  fluids.  Eat foods high in fiber. Take a stool softener if recommended by your doctor or pharmacist.  Do not drink alcohol, drive or operate machinery while taking pain medications.  Ask about other ways to  control pain, such as with heat, ice or relaxation.    Tylenol/Acetaminophen Consumption    If you feel your pain relief is insufficient, you may take Tylenol/Acetaminophen in addition to your narcotic pain medication.   Be careful not to exceed 4,000 mg of Tylenol/Acetaminophen in a 24 hour period from all sources.  If you are taking extra strength Tylenol/acetaminophen (500 mg), the maximum dose is 8 tablets in 24 hours.  If you are taking regular strength acetaminophen (325 mg), the maximum dose is 12 tablets in 24 hours.  Tylenol 975 mg given at 6:35 am.   Ok to take more after 12:35 pm.      Call a doctor for any of the following:  Signs of infection (fever, growing tenderness at the surgery site, a large amount of drainage or bleeding, severe pain, foul-smelling drainage, redness, swelling).  It has been over 8 to 10 hours since surgery and you are still not able to urinate (pass water).  Headache for over 24 hours.  Numbness, tingling or weakness the day after surgery (if you had spinal anesthesia).  Signs of Covid-19 infection (temperature over 100 degrees, shortness of breath, cough, loss of taste/smell, generalized body aches, persistent headache, chills, sore throat, nausea/vomiting/diarrhea)    Your doctor is:       Dr. Perry Camarena, ENT Otolaryngology: [phone]               After hours and weekends call the hospital @ [phone] and ask for the resident on call for:  ENT Otolaryngology  For emergency care, call the:  [place] [facility]:  [phone] (TTY for hearing impaired: [phone])

## 2025-06-30 NOTE — ANESTHESIA PROCEDURE NOTES
Airway       Patient location during procedure: OR       Procedure Start/Stop Times: 6/30/2025 7:32 AM  Staff -        Anesthesiologist:  Errol Stephen MD       CRNA: Iram Painter APRN CRNA       Performed By: CRNA  Consent for Airway        Urgency: elective  Indications and Patient Condition       Indications for airway management: pavel-procedural       Induction type:intravenous       Mask difficulty assessment: 1 - vent by mask    Final Airway Details       Final airway type: endotracheal airway       Successful airway: ETT - single  Endotracheal Airway Details        ETT size (mm): 7.0       Cuffed: yes       Cuff volume (mL): 8       Successful intubation technique: direct laryngoscopy       DL Blade Type: MAC 3       Grade View of Cords: 1       Adjucts: stylet       Position: Left       Measured from: lips       Secured at (cm): 23       Bite block used: None    Post intubation assessment        Placement verified by: capnometry, equal breath sounds and chest rise        Number of attempts at approach: 2 (1st attempt by medical student)       Number of other approaches attempted: 0       Secured with: tape       Ease of procedure: easy       Dentition: Intact and Unchanged    Medication(s) Administered   Medication Administration Time: 6/30/2025 7:32 AM

## 2025-06-30 NOTE — ANESTHESIA POSTPROCEDURE EVALUATION
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 status   CV/Hemodynamics: Uneventful            Sign Out: Acceptable CV status; No obvious hypovolemia; No obvious fluid overload   Other NRE: NONE   DID A NON-ROUTINE EVENT OCCUR?            Last vitals:  Vitals Value Taken Time   /90 06/30/25 09:40   Temp 36.8  C (98.2  F) 06/30/25 09:40   Pulse 69 06/30/25 09:40   Resp 13 06/30/25 09:40   SpO2 95 % 06/30/25 09:40       Electronically Signed By: Errol Stephen MD  June 30, 2025  1:11 PM

## 2025-07-03 ENCOUNTER — NURSE TRIAGE (OUTPATIENT)
Dept: FAMILY MEDICINE | Facility: CLINIC | Age: 70
End: 2025-07-03
Payer: COMMERCIAL

## 2025-07-03 ENCOUNTER — NURSE TRIAGE (OUTPATIENT)
Dept: NURSING | Facility: CLINIC | Age: 70
End: 2025-07-03
Payer: COMMERCIAL

## 2025-07-03 ENCOUNTER — TELEPHONE (OUTPATIENT)
Dept: OTOLARYNGOLOGY | Facility: CLINIC | Age: 70
End: 2025-07-03
Payer: COMMERCIAL

## 2025-07-03 NOTE — TELEPHONE ENCOUNTER
7-3-25  Contacts       Contact Date/Time Type Contact Phone/Fax    07/03/2025 03:41 PM CDT Phone (Incoming) Maggy Malik (Self) [phone] (M)          Attempted to reach patient to: called pt Maggy @ [phone] advised there is no appts sooner then 7-17.  Per pt her Bp was 161/94 @ 1040am and 130/88 again @  345pm.  Pt states she had surgery 6-30 to remove 2 hematomas, pt contacted the surgeon and he advised pt to see primary provider   Trans to [address] line to address high BP  Janel

## 2025-07-03 NOTE — TELEPHONE ENCOUNTER
Writer LVM following up from nurse triage note. Left direct line for pt to call with questions or concerns.     Meme Cotter RN

## 2025-07-03 NOTE — TELEPHONE ENCOUNTER
"S-(situation): Calling regarding headache and elevated blood pressure.     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 Was advised by surgeon to follow up with PCP regarding Bp. Has noted elevated Bp off and on since 6/30. Also noted headache that comes and goes. Has Hx of HTN but not currently on any medication.    A-(assessment): c/o headache in temples, fatigue. Denies chest pain. SOB, vision changes, dizziness.     R-(recommendations): RN discussed being seen at  d/t being post-op. Patient would like to continue to monitor at home. If Bp becomes elevated or headache worsens, patient will be seen in . Does have follow up visit scheduled with PCP for 7/17/25.  Also has message out to surgeons team to discuss if headache could be result of anesthesia.      Reason for Disposition   MILD - MODERATE headache present > 3 days (72 hours)    Additional Information   Negative: Difficult to awaken or acting confused (e.g., disoriented, slurred speech)   Negative: Weakness of the face, arm or leg on one side of the body and new-onset   Negative: Numbness of the face, arm or leg on one side of the body and new-onset   Negative: Loss of speech or garbled speech and new-onset   Negative: Passed out (e.g., fainted, lost consciousness, blacked out and was not responding)   Negative: Sounds like a life-threatening emergency to the triager   Negative: Unable to walk without falling   Negative: Stiff neck (can't touch chin to chest)   Negative: Other family members (or people in same household) with headaches and possibility of carbon monoxide exposure    Answer Assessment - Initial Assessment Questions  1. LOCATION: \"Where does it hurt?\"       Temples   2. ONSET: \"When did the headache start?\" (e.g., minutes, hours, days)       On and off since 6/30 following surgery  3. PATTERN: \"Does the pain come and go, or has it been constant since it started?\"      Comes and goes  4. SEVERITY: \"How bad is " "the pain?\" and \"What does it keep you from doing?\"  (e.g., Scale 1-10; mild, moderate, or severe)      Varies - worst of 5/10  5. RECURRENT SYMPTOM: \"Have you ever had headaches before?\" If Yes, ask: \"When was the last time?\" and \"What happened that time?\"       no  6. CAUSE: \"What do you think is causing the headache?\"      Unsure if Bp related  7. MIGRAINE: \"Have you been diagnosed with migraine headaches?\" If Yes, ask: \"Is this headache similar?\"       no  8. HEAD INJURY: \"Has there been any recent injury to the head?\"       no  9. OTHER SYMPTOMS: \"Do you have any other symptoms?\" (e.g., fever, stiff neck, eye pain, sore throat, cold symptoms)      Fatigue  10. PREGNANCY: \"Is there any chance you are pregnant?\" \"When was your last menstrual period?\"        N/a    Protocols used: Headache-A-OH    "

## 2025-07-03 NOTE — TELEPHONE ENCOUNTER
General Call    Contacts       Contact Date/Time Type Contact Phone/Fax    07/03/2025 03:41 PM CDT Phone (Incoming) Maggy Malik (Self) [phone] (M)          Reason for Call:  Appointment      What are your questions or concerns:  Patient scheduled for follow up on 7/17, however would like to be seen sooner if at all possible    Date of last appointment with provider: 12/19/2024    Could we send this information to you in [employer] or would you prefer to receive a phone call?:   Patient would prefer a phone call   Okay to leave a detailed message?: Yes at Cell number on file:    Telephone Information:   Mobile [phone]

## 2025-07-03 NOTE — TELEPHONE ENCOUNTER
Nurse Triage SBAR    Is this a 2nd Level Triage? YES, LICENSED PRACTITIONER REVIEW IS REQUIRED    Situation:  Patient calling in with on/off headache since Monday     Background:  Patient with [facility] B[facility] olfactory [facility]art[facility] presents today with on and off headache and irregular blood pressures since Monday's procedure.   Medications: ibuprofen - last dose taken this morning around 9 am    Assessment:    -chief complaint: headache since Monday  -Patient reports headache to be on/off and improving  -Patient reports blood pressure this morning 161/94, current blood pressure 155/96  -pulse 83  -Denies blurred vision, dizziness, lightheadedness  -Patient reports headache pain on left side of head, temple area  -Does report tiredness more than normal  -Pt also reports blood pressures during procedure spiked over 200 and unsure if headaches and blood pressure irregularities are continued symptoms from procedure  -taking ibuprofen for pain, last dose taken at 9 am  -Pt reports did try tylenol but headache appeared after tylenol dose so stopped taking    Protocol Recommended Disposition:   See in Office Within 3 Days    Recommendation: Patient advised to be seen in clinic within 3 days. Patient also recommended to use journal to log headaches, symptoms, times, use cool cloth for comfort, rest eyes often, and to call back if blood pressures do not improve or symptoms worsen. Patient also told that care team would be notified to follow up as well. Patient agrees with recommendations.     Routed to provider/care team.  TYE Maradiaga  P Central Nursing/Red Flag Triage & Med Refill Team          Reason for Disposition   MILD - MODERATE headache present > 3 days (72 hours)    Additional Information   Negative: Difficult to awaken or acting confused (e.g., disoriented, slurred speech)   Negative: Weakness of the face, arm or leg on one side of the body and new-onset    Negative: Numbness of the face, arm or leg on one side of the body and new-onset   Negative: Loss of speech or garbled speech and new-onset   Negative: Passed out (e.g., fainted, lost consciousness, blacked out and was not responding)   Negative: Sounds like a life-threatening emergency to the triager   Negative: Followed a head injury within last 3 days   Negative: Traumatic Brain Injury (TBI) is suspected   Negative: Sinus pain or congestion is main symptom(s)   Negative: Influenza suspected (i.e., cough, fever, other respiratory symptoms; probable influenza exposure)   Negative: Pregnant   Negative: Unable to walk without falling   Negative: Stiff neck (can't touch chin to chest)   Negative: Other family members (or people in same household) with headaches and possibility of carbon monoxide exposure   Negative: SEVERE headache, states 'worst headache' of life   Negative: SEVERE headache, sudden-onset (i.e., reaching maximum intensity within seconds to 1 hour)   Negative: Severe pain in one eye   Negative: Loss of vision or double vision (Exception: Same as previously diagnosed migraines.)   Negative: Patient sounds very sick or weak to the triager   Negative: Fever > 103 F (39.4 C)   Negative: Fever > 100 F (37.8 C) and has diabetes mellitus or a weak immune system (e.g., HIV positive, cancer chemotherapy, organ transplant, splenectomy, chronic steroids)   Negative: SEVERE headache (e.g., excruciating) and has had severe headaches before   Negative: SEVERE headache and not relieved by pain meds   Negative: SEVERE headache and vomiting   Negative: SEVERE headache and fever   Negative: New-onset headache and weak immune system (e.g., HIV positive, cancer chemo, splenectomy, organ transplant, chronic steroids)   Negative: Fever present > 3 days (72 hours)   Negative: Patient wants to be seen   Negative: MODERATE headache (e.g., interferes with normal activities) present > 24 hours and unexplained    Answer  "Assessment - Initial Assessment Questions  1. LOCATION: Left side, temple   2. ONSET: Monday post procedure   3. PATTERN: Comes and goes, does appear to be improving through the days  4. SEVERITY: 4/10  5. RECURRENT SYMPTOM: No, patient reports not getting headaches often   6. CAUSE: possible Lingering symptom from procedure done on Monday - pt reports during procedure blood pressure spiked over 200  7. MIGRAINE: Pt states, \"I don't get headaches that often, that's why I think this is odd\"  8. HEAD INJURY: No   9. OTHER SYMPTOMS: Does report tiredness, denies all other symptoms   10. PREGNANCY: No    Protocols used: Headache-A-OH    "

## 2025-07-03 NOTE — TELEPHONE ENCOUNTER
Writer called and spoke to pt who stated that her headaches have gotten better. Pt thinks it is related to her high blood pressure. PT has blood pressure cuff at home, does not normally have high BP, pt stating the blood pressures today have been normal. Pt stated she has family history of high BP.  Pt taking ibuprofen for pt.  Pt not having any further concerns. Writer stated that if pt has consistently high BP she will need to be evaluated a urgent care. Pt expressed understanding.    Meme Cotter RN

## 2025-07-03 NOTE — TELEPHONE ENCOUNTER
Does patient have a cuff to know her numbers? How elevated are her Bps? Certainly could be from pain/anesthesia possibly. Would like to see her numbers between now and our visit on 7/17 as long as no new concerning symptoms develop. If severe range 160s/110s would need to consider urgent care visit/medication initiation.

## 2025-07-07 NOTE — TELEPHONE ENCOUNTER
"Pt calling back. RN relayed provider's message and asked pt if she has been monitoring blood pressures at home.     BP at home:  7/4 - 152/93  141/87  131/77 152/88  7/5 - 145/82 123/80 134/90  7/6 - 143/85 125/75  7/7 - 132/86    Pt reports that her headaches have gotten better. She states that they feel like a muscle/tension headache rather than a \"temple headache\".     RN encouraged pt to continue keeping a log of BPs until appointment with PCP on 7/17. If BPs increase, call back for RN triage. Pt agreeable to plan.     TYE Davis  [facility]    "

## 2025-07-07 NOTE — TELEPHONE ENCOUNTER
Contacts       Contact Date/Time Type Contact Phone/Fax    07/03/2025 03:41 PM CDT Phone (Incoming) Maggy Malik (Self) [phone] (M)    07/03/2025 03:46 PM CDT Phone (Outgoing) Maggy Malik (Self) [phone] (M)    Talked with Patient     07/07/2025 09:58 AM CDT Phone (Outgoing) Maggy Malik (Self) [phone] (M)    Left Message           Attempted to reach patient to: Relay a message and gather information    Regarding: RN triage    Information needed: What is your BP? Has it been elevated (160/110). If so, UC recommended.    Action to take: Transfer to site RN green line (or update telephone encounter in after-hours)    TYE Davis  [facility]

## 2025-07-08 ENCOUNTER — OFFICE VISIT (OUTPATIENT)
Dept: OTOLARYNGOLOGY | Facility: CLINIC | Age: 70
End: 2025-07-08
Payer: COMMERCIAL

## 2025-07-08 VITALS
OXYGEN SATURATION: 95 % | SYSTOLIC BLOOD PRESSURE: 156 MMHG | HEART RATE: 78 BPM | HEIGHT: 65 IN | WEIGHT: 136.5 LBS | DIASTOLIC BLOOD PRESSURE: 84 MMHG | BODY MASS INDEX: 22.74 KG/M2

## 2025-07-08 DIAGNOSIS — Q85.9: Primary | ICD-10-CM

## 2025-07-08 PROCEDURE — 99212 OFFICE O/P EST SF 10 MIN: CPT | Mod: 25 | Performed by: STUDENT IN AN ORGANIZED HEALTH CARE EDUCATION/TRAINING PROGRAM

## 2025-07-08 PROCEDURE — 3077F SYST BP >= 140 MM HG: CPT | Performed by: STUDENT IN AN ORGANIZED HEALTH CARE EDUCATION/TRAINING PROGRAM

## 2025-07-08 PROCEDURE — 31237 NSL/SINS NDSC SURG BX POLYPC: CPT | Mod: 50 | Performed by: STUDENT IN AN ORGANIZED HEALTH CARE EDUCATION/TRAINING PROGRAM

## 2025-07-08 PROCEDURE — 1126F AMNT PAIN NOTED NONE PRSNT: CPT | Performed by: STUDENT IN AN ORGANIZED HEALTH CARE EDUCATION/TRAINING PROGRAM

## 2025-07-08 PROCEDURE — 3079F DIAST BP 80-89 MM HG: CPT | Performed by: STUDENT IN AN ORGANIZED HEALTH CARE EDUCATION/TRAINING PROGRAM

## 2025-07-08 ASSESSMENT — PAIN SCALES - GENERAL: PAINLEVEL_OUTOF10: NO PAIN (0)

## 2025-07-08 NOTE — LETTER
7/8/2025       RE: Suzanne Malik  [address] [place] [address]     Dear Colleague,    Thank you for referring your patient, Suzanne Malik, to the [facility] [place] at [facility]. Please see a copy of my visit note below.      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  Bilateral resection of olfactory cleft hamartomas  2.  Bilateral inferior turbinate outfracture  3.  Use of intraoperative image guidance  Path confirmed REAH    History of Present Illness:   Suzanne Malik is a 70 year old female who presents for consultation regarding nasal lesion.    Starting 6/2023  Was concerned about memory issues  Ultimately had imaging - MRI/CT that showed lesions in the olfactory cleft/anterior nasal vault  Was noticing around the time there is also some oxygen desaturations  Notices dependent nasal congestion     Currently - fatigue, attributes to poor airflow/oxygen  Does notice occasional nasal blockage  Smell hasn't changed  Thick drainage    Tried xhance - 1-2 wks, didn't seem to help; switched to flonase due to cost for another 2 wks  AM/PM uses saline sprays  Seems to have helped night time sats    Known ragweed allergy     Procedure Note  Procedure performed: Rigid nasal endoscopy, debridement  Indication: To evaluate for sinonasal pathology not visualized on routine anterior rhinoscopy  Anesthesia: 4% topical  lidocaine with 0.05% oxymetazoline  Description of procedure: A 0-degree, 4 mm rigid endoscope was inserted into bilateral nasal cavities and the nasal valves, nasal cavity, middle meatus, sphenoethmoid recess, and nasopharynx were thoroughly evaluated for evidence of obstruction, edema, purulence, polyps and/or mass/lesion.     Findings:    Debrided nasapore bilaterally with suction  Healing well  MM clear    The patient tolerated the procedure well without complication.     Perry Camarena MD    Rhinology  Endoscopic Skull Base Surgery  [facility]  Department of Otolaryngology - Head & Neck Surgery        Again, thank you for allowing me to participate in the care of your patient.      Sincerely,    Perry Camarena MD

## 2025-07-08 NOTE — PROGRESS NOTES
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  Bilateral resection of olfactory cleft hamartomas  2.  Bilateral inferior turbinate outfracture  3.  Use of intraoperative image guidance  Path confirmed REAH    History of Present Illness:   Suzanne Malik is a 70 year old female who presents for consultation regarding nasal lesion.    Starting 6/2023  Was concerned about memory issues  Ultimately had imaging - MRI/CT that showed lesions in the olfactory cleft/anterior nasal vault  Was noticing around the time there is also some oxygen desaturations  Notices dependent nasal congestion     Currently - fatigue, attributes to poor airflow/oxygen  Does notice occasional nasal blockage  Smell hasn't changed  Thick drainage    Tried xhance - 1-2 wks, didn't seem to help; switched to flonase due to cost for another 2 wks  AM/PM uses saline sprays  Seems to have helped night time sats    Known ragweed allergy     Procedure Note  Procedure performed: Rigid nasal endoscopy, debridement  Indication: To evaluate for sinonasal pathology not visualized on routine anterior rhinoscopy  Anesthesia: 4% topical lidocaine with 0.05% oxymetazoline  Description of procedure: A 0-degree, 4 mm rigid endoscope was inserted into bilateral nasal cavities and the nasal valves, nasal cavity, middle meatus, sphenoethmoid recess, and nasopharynx were thoroughly evaluated for evidence of obstruction, edema, purulence, polyps and/or mass/lesion.      Findings:    Debrided nasapore bilaterally with suction  Healing well  MM clear    The patient tolerated the procedure well without complication.     Perry Camarena MD    Rhinology  Endoscopic Skull Base Surgery  [facility]  Department of Otolaryngology - Head & Neck Surgery

## 2025-07-08 NOTE — PATIENT INSTRUCTIONS
You were seen in the ENT Clinic today by Dr. Camarena. If you have any questions or concerns after your appointment, please contact us (see below)    Please return to clinic in 4-6 weeks.    How to Contact Us:  Send a GOSO message to your provider. Our team will respond to you via GOSO. Occasionally, we will need to call you to get further information.  For urgent matters (Monday-Friday), call the ENT Clinic: [phone] and speak with a call center team member - they will route your call appropriately.   If you'd like to speak directly with a nurse, please find our contact information below. We do our best to check voicemail frequently throughout the day, and will work to call you back within 1-2 days. For urgent matters, please use the general clinic phone numbers listed above.    Cyn KITCHEN RN, BSN   RN Care Coordinator, ENT Clinic  [facility]  Direct: [phone]  Shelli LASSITER LPN  Direct: [phone]

## 2025-07-08 NOTE — NURSING NOTE
"Chief Complaint   Patient presents with    RECHECK   Blood pressure (!) 156/84, pulse 78, height 1.651 m (5' 5\"), weight 61.9 kg (136 lb 8 oz), SpO2 95%, not currently breastfeeding. Kyle Guallpa, EMT    "

## 2025-07-17 ENCOUNTER — OFFICE VISIT (OUTPATIENT)
Dept: FAMILY MEDICINE | Facility: CLINIC | Age: 70
End: 2025-07-17
Payer: COMMERCIAL

## 2025-07-17 VITALS
HEIGHT: 65 IN | WEIGHT: 137.1 LBS | DIASTOLIC BLOOD PRESSURE: 89 MMHG | OXYGEN SATURATION: 98 % | SYSTOLIC BLOOD PRESSURE: 152 MMHG | BODY MASS INDEX: 22.84 KG/M2 | RESPIRATION RATE: 16 BRPM | HEART RATE: 75 BPM

## 2025-07-17 DIAGNOSIS — R22.31 LUMP OF SKIN OF RIGHT UPPER EXTREMITY: ICD-10-CM

## 2025-07-17 DIAGNOSIS — I10 ESSENTIAL HYPERTENSION, BENIGN: Primary | ICD-10-CM

## 2025-07-17 RX ORDER — LOSARTAN POTASSIUM 50 MG/1
50 TABLET ORAL DAILY
Qty: 90 TABLET | Refills: 1 | Status: SHIPPED | OUTPATIENT
Start: 2025-07-17

## 2025-07-17 NOTE — PATIENT INSTRUCTIONS
You can call the radiology department at [phone] to schedule your imaging test that was ordered.      Based on our discussion, I have outlined the following instructions for you:      - Start taking losartan 25 mg once a day for 3-4 days. If you feel okay and your blood pressure is still high, increase to 50 mg once a day. If your blood pressure is good with 25 mg, you can stay on that dose.  - Go for blood tests in 2 weeks to check how your kidneys are working and your potassium levels.  - Schedule a follow-up appointment in one month. You can choose to do this through a video call or by visiting the office to check your blood pressure.  - Keep up with healthy lifestyle changes like eating well and exercising regularly.  - Go for an ultrasound of your right forearm to check the lump.  - We can talk about estrogen therapy again if needed.      Thank you again for your visit, and we look forward to supporting you in your journey to better health.

## 2025-07-17 NOTE — PROGRESS NOTES
Assessment & Plan     Essential hypertension, benign  Assessment: Patient with history of hypertension, previously to 2015 on lisinopril (discontinued due to LH/dizziness as stress resolved and also side effects), now with elevated blood pressures (140-150 mmHg systolic) and intermittent headaches since June 30, 2025 or earlier. Possible contributing factors include cessation of estrogen therapy, genetic predisposition, and recent stressors. Recent BMP blood panel on June 27, 2025, was normal. Discussed medication options: lisinopril avoided due to prior side effects (wheezing/asthma-like symptoms), losartan chosen as alternative due to lower risk of cough/respiratory side effects, diuretic not preferred due to urinary frequency. Discussed estrogen as a possible contributor to blood pressure changes, but not recommended to restart at this time due to age and risk profile; would reconsider if quality of life significantly worsens.  - Start losartan 25 mg daily for 3-4 days, then increase to 50 mg daily as tolerated and based on blood pressure response; patient may remain on 25 mg if blood pressure is controlled  - Ordered blood work in 2 weeks to assess kidney function and potassium  - Follow-up in one month via video or office visit for hypertension check  - Goal blood pressure: 135/85 mmHg or less, ideally 130/80 mmHg  - Continue lifestyle modifications (diet, exercise)    Lump of skin of right upper extremity  Assessment: Soft tissue mass (1.5-2 cm) on right forearm, present for several years, not currently causing symptoms. Differential includes lipoma or hamartoma, given history of hamartomas in nasal cavities and pancreas. Discussed options: monitor if asymptomatic, surgical referral if bothersome or symptomatic, risks of surgery include possible nerve involvement.  - Ordered ultrasound of right forearm to evaluate mass  - Will reassess need for surgical referral based on ultrasound results and patient  preference; for now she declines    Olfactory cleft hamartomas (nasal lesions)  Assessment: Status post bilateral resection of olfactory cleft hamartomas and bilateral inferior turbinate outfracture on June 30, 2025. 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 Lesions were slow-growing and previously stable on imaging. No current nasal symptoms.  - Continue routine follow-up as needed    Estrogen withdrawal symptoms  Assessment: Gradual weaning off estrogen over several years, fully discontinued in April 2025. Onset of hot flashes and sleep disturbances after cessation. Discussed risks and benefits of restarting estrogen; not recommended at this time due to age and risk profile, but may reconsider if quality of life significantly worsens.  - Monitor symptoms; revisit estrogen therapy discussion if needed    Memory concerns  Assessment: Memory concerns led to neurology evaluation, MRI, CT, and lumbar puncture. Blood test and lumbar puncture results non-conclusive for Alzheimer s disease.  - Continue to monitor cognitive symptoms; follow up with neurology as needed      Consent was obtained from the patient to use an AI documentation tool in the creation of this note.  MED REC REQUIRED  Post Medication Reconciliation Status:  Discharge medications reconciled and changed, see notes/orders  Follow-up       Alize   Maggy is a 70 year old, presenting for the following health issues:  Hospital F/U        7/17/2025     9:20 AM   Additional Questions   Roomed by Briana PATEL MA     HPI Maggy Malik, 70 years    Hypertension and Headaches  - History of hypertension, previously on blood pressure medications (lisinopril 10mg for ~20 years, dose reduced to 5 mg, discontinued around 2015 due to lightheadedness and asthma-like symptoms during swimming)  - Blood pressure logs since June 30, 2025, showing elevated readings (often 140-150 mmHg systolic), with onset after ENT surgery on June 30, 2025  -  Headaches occurring intermittently, often when blood pressure is elevated  - No pain reported after ENT surgery, only mild to mod temporal headaches- trying to not use any meds like NSAIDS  - No history of asthma  - Patient is healthy, active, and eats well; has maximized lifestyle modifications    Olfactory Cleft Hamartomas (Nasal Lesions)  - Bilateral resection of olfactory cleft hamartomas and bilateral inferior turbinate outfracture on June 30, 2025  - 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  - Lesions identified after MRI and CT ordered for memory concerns  - Symptoms prior to surgery included nasal congestion, especially when sleeping on the side, worsened during allergy season  - Previous nasal sprays ineffective  - CT scans from two years prior showed similar findings, lesions described as slow-growing  - No significant pain reported from nasal lesions    Sleep-Related Hypoxemia  - Evaluated in 2023 for hypoxemia during sleep, identified via Apple Watch data  - No further evaluation recommended by sleep clinic due to low risk for sleep apnea and absence of other symptoms however I reviewed with her I suspect these hamartomas in the nasal cavities were the cause; she was even seeing another ENT group around that time for suspected nasal polyps/sprays (hindsight those were still the hamartomas) but sx not improved until after the hamartomas were removed.     Estrogen Withdrawal Symptoms  - Gradual weaning off estrogen over several years, fully discontinued in April 2025  - hot flashes began only after complete cessation, not during taper  - she would like to consider potentially going back on this but not yet  - s/p hysterectomy    Soft Tissue Masses  - History of 7 mm nodular focal fatty change in mid pancreatic body found on MRI in October 2020, described as possible hematoma by the pt and I would agree possible; no further work up was needed.   - Noted soft tissue mass (1.5-2 cm) on  "right forearm, present for several years, not currently causing symptoms; duration: several years  - Previous advice was to monitor unless symptomatic    Memory Concerns  - Memory concerns led to neurology evaluation, MRI, CT, and lumbar puncture  - Blood test and lumbar puncture results non-conclusive for Alzheimer s disease                      Objective    BP (!) 152/89 (BP Location: Left arm, Patient Position: Sitting, Cuff Size: Adult Regular)   Pulse 75   Resp 16   Ht 1.651 m (5' 5\")   Wt 62.2 kg (137 lb 1.6 oz)   LMP  (LMP Unknown)   SpO2 98%   BMI 22.81 kg/m    Body mass index is 22.81 kg/m .  Physical Exam   GENERAL: alert and no distress  NECK: no adenopathy, no asymmetry, masses, or scars  RESP: lungs clear to auscultation - no rales, rhonchi or wheezes  CV: regular rate and rhythm, normal S1 S2, no S3 or S4, no murmur, click or rub, no peripheral edema  ABDOMEN: soft, nontender, no hepatosplenomegaly, no masses and bowel sounds normal  MS: no gross musculoskeletal defects noted, no edema  SKIN: 2x3cm soft tissue lump on right forearm.            Signed Electronically by: Ciara Moe MD    "

## 2025-07-29 ENCOUNTER — HOSPITAL ENCOUNTER (OUTPATIENT)
Dept: ULTRASOUND IMAGING | Facility: CLINIC | Age: 70
Discharge: HOME | End: 2025-07-29
Attending: FAMILY MEDICINE
Payer: COMMERCIAL

## 2025-07-29 DIAGNOSIS — R22.31 LUMP OF SKIN OF RIGHT UPPER EXTREMITY: ICD-10-CM

## 2025-07-29 PROCEDURE — 76882 US LMTD JT/FCL EVL NVASC XTR: CPT | Mod: RT

## 2025-08-05 ENCOUNTER — LAB (OUTPATIENT)
Dept: LAB | Facility: CLINIC | Age: 70
End: 2025-08-05
Payer: COMMERCIAL

## 2025-08-05 DIAGNOSIS — I10 ESSENTIAL HYPERTENSION, BENIGN: ICD-10-CM

## 2025-08-05 LAB
ANION GAP SERPL CALCULATED.3IONS-SCNC: 10 MMOL/L (ref 7–15)
BUN SERPL-MCNC: 19.5 MG/DL (ref 8–23)
CALCIUM SERPL-MCNC: 9.6 MG/DL (ref 8.8–10.4)
CHLORIDE SERPL-SCNC: 102 MMOL/L (ref 98–107)
CREAT SERPL-MCNC: 0.98 MG/DL (ref 0.51–0.95)
EGFRCR SERPLBLD CKD-EPI 2021: 61 ML/MIN/1.73M2
GLUCOSE SERPL-MCNC: 79 MG/DL (ref 70–99)
HCO3 SERPL-SCNC: 27 MMOL/L (ref 22–29)
POTASSIUM SERPL-SCNC: 4.7 MMOL/L (ref 3.4–5.3)
SODIUM SERPL-SCNC: 139 MMOL/L (ref 135–145)

## 2025-08-05 PROCEDURE — 80048 BASIC METABOLIC PNL TOTAL CA: CPT

## 2025-08-05 PROCEDURE — 36415 COLL VENOUS BLD VENIPUNCTURE: CPT

## 2025-08-12 ENCOUNTER — VIRTUAL VISIT (OUTPATIENT)
Dept: FAMILY MEDICINE | Facility: CLINIC | Age: 70
End: 2025-08-12
Payer: COMMERCIAL

## 2025-08-12 DIAGNOSIS — N95.1 MENOPAUSAL SYNDROME (HOT FLASHES): Primary | ICD-10-CM

## 2025-08-12 PROCEDURE — 98005 SYNCH AUDIO-VIDEO EST LOW 20: CPT | Performed by: FAMILY MEDICINE

## 2025-08-12 RX ORDER — ESTRADIOL 0.03 MG/D
1 PATCH TRANSDERMAL WEEKLY
Qty: 12 PATCH | Refills: 3 | Status: SHIPPED | OUTPATIENT
Start: 2025-08-12

## 2108-07-18 ENCOUNTER — RECORDS - HEALTHEAST (OUTPATIENT)
Dept: ADMINISTRATIVE | Facility: OTHER | Age: OVER 89
End: 2108-07-18

## (undated) DEVICE — DRSG HOLDER NASAL DALE 600

## (undated) DEVICE — NDL 25GA 1.5" 305127

## (undated) DEVICE — GLOVE PROTEXIS MICRO 7.0 LT BLUE 2D73PM70

## (undated) DEVICE — TRACKER PATIENT NON-INVASIVE AXIEM 9734887

## (undated) DEVICE — LINEN TOWEL PACK X5 5464

## (undated) DEVICE — STPL SKIN 35W ROTATING HEAD PRW35

## (undated) DEVICE — SUCTION MANIFOLD NEPTUNE 2 SYS 1 PORT 702-025-000

## (undated) DEVICE — SPONGE COTTONOID 2X1/2" 80-1406

## (undated) DEVICE — DRSG TELFA ISLAND 4X8" 7541

## (undated) DEVICE — SPLINT INTRANASAL POSISEPX GEL SPONGE 9210584

## (undated) DEVICE — ESU SUCTION CAUTERY 10FR FOOT CONTROL E2505-10FR

## (undated) DEVICE — SOL NACL 0.9% INJ 1000ML BAG 2B1324X

## (undated) DEVICE — SOL NACL 0.9% IRRIG 500ML BOTTLE 2F7123

## (undated) DEVICE — SPECIMEN TRAP STRYKER NEPTUNE IN-LINE 0700-050-000

## (undated) DEVICE — SYR 10ML FINGER CONTROL W/O NDL 309695

## (undated) DEVICE — ESU GROUND PAD ADULT W/CORD E7507

## (undated) DEVICE — SYR 30ML LL W/O NDL 302832

## (undated) DEVICE — STRAP KNEE/BODY 31143004

## (undated) DEVICE — TUBING SUCTION 10'X3/16" N510

## (undated) DEVICE — PACK ENT ENDOSCOPY CUSTOM ASC

## (undated) DEVICE — ENDO SHEATH STORZ SHARPSITE ENDOSCRUB 0DEG 4MM 1912000

## (undated) DEVICE — ENDO SHEATH STORZ SHARPSITE ENDOSCRUB 30DEG 4MM 1912010

## (undated) DEVICE — ESU HOLSTER PLASTIC DISP E2400

## (undated) RX ORDER — ONDANSETRON 2 MG/ML
INJECTION INTRAMUSCULAR; INTRAVENOUS
Status: DISPENSED
Start: 2025-06-30

## (undated) RX ORDER — HYDRALAZINE HYDROCHLORIDE 20 MG/ML
INJECTION INTRAMUSCULAR; INTRAVENOUS
Status: DISPENSED
Start: 2025-06-30

## (undated) RX ORDER — ACETAMINOPHEN 325 MG/1
TABLET ORAL
Status: DISPENSED
Start: 2025-06-30

## (undated) RX ORDER — LIDOCAINE HYDROCHLORIDE AND EPINEPHRINE 10; 10 MG/ML; UG/ML
INJECTION, SOLUTION INFILTRATION; PERINEURAL
Status: DISPENSED
Start: 2025-06-30

## (undated) RX ORDER — LABETALOL HYDROCHLORIDE 5 MG/ML
INJECTION, SOLUTION INTRAVENOUS
Status: DISPENSED
Start: 2025-06-30

## (undated) RX ORDER — DEXAMETHASONE SODIUM PHOSPHATE 10 MG/ML
INJECTION, SOLUTION INTRAMUSCULAR; INTRAVENOUS
Status: DISPENSED
Start: 2025-06-30

## (undated) RX ORDER — OXYMETAZOLINE HYDROCHLORIDE 0.05 G/100ML
SPRAY NASAL
Status: DISPENSED
Start: 2025-06-30

## (undated) RX ORDER — FENTANYL CITRATE 50 UG/ML
INJECTION, SOLUTION INTRAMUSCULAR; INTRAVENOUS
Status: DISPENSED
Start: 2025-06-30

## (undated) RX ORDER — HYDROMORPHONE HYDROCHLORIDE 1 MG/ML
INJECTION, SOLUTION INTRAMUSCULAR; INTRAVENOUS; SUBCUTANEOUS
Status: DISPENSED
Start: 2025-06-30

## (undated) RX ORDER — PROPOFOL 10 MG/ML
INJECTION, EMULSION INTRAVENOUS
Status: DISPENSED
Start: 2025-06-30

## (undated) RX ORDER — CEFAZOLIN SODIUM 2 G/50ML
SOLUTION INTRAVENOUS
Status: DISPENSED
Start: 2025-06-30